# Patient Record
Sex: MALE | URBAN - METROPOLITAN AREA
[De-identification: names, ages, dates, MRNs, and addresses within clinical notes are randomized per-mention and may not be internally consistent; named-entity substitution may affect disease eponyms.]

---

## 2018-01-01 ENCOUNTER — OUTPATIENT (OUTPATIENT)
Dept: OUTPATIENT SERVICES | Facility: HOSPITAL | Age: 23
LOS: 1 days | End: 2018-01-01
Payer: MEDICAID

## 2018-01-01 PROCEDURE — G9001: CPT

## 2018-01-22 ENCOUNTER — INPATIENT (INPATIENT)
Facility: HOSPITAL | Age: 23
LOS: 2 days | Discharge: HOME | DRG: 637 | End: 2018-01-25
Attending: HOSPITALIST | Admitting: INTERNAL MEDICINE
Payer: MEDICAID

## 2018-01-22 VITALS
HEART RATE: 114 BPM | TEMPERATURE: 93 F | DIASTOLIC BLOOD PRESSURE: 84 MMHG | WEIGHT: 130.07 LBS | OXYGEN SATURATION: 100 % | SYSTOLIC BLOOD PRESSURE: 139 MMHG | RESPIRATION RATE: 18 BRPM | HEIGHT: 71 IN

## 2018-01-22 DIAGNOSIS — F10.20 ALCOHOL DEPENDENCE, UNCOMPLICATED: ICD-10-CM

## 2018-01-22 DIAGNOSIS — E13.10 OTHER SPECIFIED DIABETES MELLITUS WITH KETOACIDOSIS WITHOUT COMA: ICD-10-CM

## 2018-01-22 LAB
ACETONE SERPL-MCNC: ABNORMAL
ALBUMIN SERPL ELPH-MCNC: 5 G/DL — SIGNIFICANT CHANGE UP (ref 3.3–5.2)
ALP SERPL-CCNC: 131 U/L — HIGH (ref 40–120)
ALT FLD-CCNC: 26 U/L — SIGNIFICANT CHANGE UP
ANION GAP SERPL CALC-SCNC: 17 MMOL/L — SIGNIFICANT CHANGE UP (ref 5–17)
ANION GAP SERPL CALC-SCNC: 31 MMOL/L — HIGH (ref 5–17)
ANION GAP SERPL CALC-SCNC: >43 MMOL/L — HIGH (ref 5–17)
APPEARANCE UR: CLEAR — SIGNIFICANT CHANGE UP
APTT BLD: 35.2 SEC — SIGNIFICANT CHANGE UP (ref 27.5–37.4)
AST SERPL-CCNC: 26 U/L — SIGNIFICANT CHANGE UP
BASE EXCESS BLDA CALC-SCNC: -29.6 MMOL/L — LOW (ref -2–2)
BASOPHILS # BLD AUTO: 0 K/UL — SIGNIFICANT CHANGE UP (ref 0–0.2)
BASOPHILS NFR BLD AUTO: 1 % — SIGNIFICANT CHANGE UP (ref 0–2)
BILIRUB SERPL-MCNC: 0.3 MG/DL — LOW (ref 0.4–2)
BILIRUB UR-MCNC: NEGATIVE — SIGNIFICANT CHANGE UP
BLOOD GAS COMMENTS ARTERIAL: SIGNIFICANT CHANGE UP
BUN SERPL-MCNC: 15 MG/DL — SIGNIFICANT CHANGE UP (ref 8–20)
BUN SERPL-MCNC: 18 MG/DL — SIGNIFICANT CHANGE UP (ref 8–20)
BUN SERPL-MCNC: 9 MG/DL — SIGNIFICANT CHANGE UP (ref 8–20)
CALCIUM SERPL-MCNC: 10 MG/DL — SIGNIFICANT CHANGE UP (ref 8.6–10.2)
CALCIUM SERPL-MCNC: 8.1 MG/DL — LOW (ref 8.6–10.2)
CALCIUM SERPL-MCNC: 8.2 MG/DL — LOW (ref 8.6–10.2)
CHLORIDE SERPL-SCNC: 100 MMOL/L — SIGNIFICANT CHANGE UP (ref 98–107)
CHLORIDE SERPL-SCNC: 81 MMOL/L — LOW (ref 98–107)
CHLORIDE SERPL-SCNC: 94 MMOL/L — LOW (ref 98–107)
CO2 SERPL-SCNC: 16 MMOL/L — LOW (ref 22–29)
CO2 SERPL-SCNC: 6 MMOL/L — CRITICAL LOW (ref 22–29)
CO2 SERPL-SCNC: <6 MMOL/L — CRITICAL LOW (ref 22–29)
COLOR SPEC: YELLOW — SIGNIFICANT CHANGE UP
CREAT SERPL-MCNC: 0.76 MG/DL — SIGNIFICANT CHANGE UP (ref 0.5–1.3)
CREAT SERPL-MCNC: 0.79 MG/DL — SIGNIFICANT CHANGE UP (ref 0.5–1.3)
CREAT SERPL-MCNC: 1.35 MG/DL — HIGH (ref 0.5–1.3)
DIFF PNL FLD: ABNORMAL
EOSINOPHIL # BLD AUTO: 0 K/UL — SIGNIFICANT CHANGE UP (ref 0–0.5)
ETHANOL SERPL-MCNC: <10 MG/DL — SIGNIFICANT CHANGE UP
GAS PNL BLDA: SIGNIFICANT CHANGE UP
GLUCOSE BLDC GLUCOMTR-MCNC: 188 MG/DL — HIGH (ref 70–99)
GLUCOSE BLDC GLUCOMTR-MCNC: 198 MG/DL — HIGH (ref 70–99)
GLUCOSE BLDC GLUCOMTR-MCNC: 201 MG/DL — HIGH (ref 70–99)
GLUCOSE BLDC GLUCOMTR-MCNC: 209 MG/DL — HIGH (ref 70–99)
GLUCOSE BLDC GLUCOMTR-MCNC: 221 MG/DL — HIGH (ref 70–99)
GLUCOSE BLDC GLUCOMTR-MCNC: 230 MG/DL — HIGH (ref 70–99)
GLUCOSE BLDC GLUCOMTR-MCNC: 243 MG/DL — HIGH (ref 70–99)
GLUCOSE BLDC GLUCOMTR-MCNC: 245 MG/DL — HIGH (ref 70–99)
GLUCOSE BLDC GLUCOMTR-MCNC: 272 MG/DL — HIGH (ref 70–99)
GLUCOSE BLDC GLUCOMTR-MCNC: 359 MG/DL — HIGH (ref 70–99)
GLUCOSE BLDC GLUCOMTR-MCNC: 416 MG/DL — HIGH (ref 70–99)
GLUCOSE SERPL-MCNC: 196 MG/DL — HIGH (ref 70–115)
GLUCOSE SERPL-MCNC: 214 MG/DL — HIGH (ref 70–115)
GLUCOSE SERPL-MCNC: 516 MG/DL — CRITICAL HIGH (ref 70–115)
GLUCOSE UR QL: 1000 MG/DL
HCO3 BLDA-SCNC: 6 MMOL/L — LOW (ref 20–26)
HCT VFR BLD CALC: 53 % — HIGH (ref 42–52)
HGB BLD-MCNC: 17.9 G/DL — SIGNIFICANT CHANGE UP (ref 14–18)
HOROWITZ INDEX BLDA+IHG-RTO: 21 — SIGNIFICANT CHANGE UP
INR BLD: 0.97 RATIO — SIGNIFICANT CHANGE UP (ref 0.88–1.16)
KETONES UR-MCNC: ABNORMAL
LACTATE BLDV-MCNC: 3.4 MMOL/L — HIGH (ref 0.5–2)
LACTATE BLDV-MCNC: 7.3 MMOL/L — CRITICAL HIGH (ref 0.5–2)
LACTATE SERPL-SCNC: 1.6 MMOL/L — SIGNIFICANT CHANGE UP (ref 0.5–2)
LEUKOCYTE ESTERASE UR-ACNC: NEGATIVE — SIGNIFICANT CHANGE UP
LIDOCAIN IGE QN: 14 U/L — LOW (ref 22–51)
LYMPHOCYTES # BLD AUTO: 14 % — LOW (ref 20–55)
LYMPHOCYTES # BLD AUTO: 3.7 K/UL — SIGNIFICANT CHANGE UP (ref 1–4.8)
MAGNESIUM SERPL-MCNC: 1.6 MG/DL — SIGNIFICANT CHANGE UP (ref 1.6–2.6)
MAGNESIUM SERPL-MCNC: 2.7 MG/DL — HIGH (ref 1.6–2.6)
MCHC RBC-ENTMCNC: 30.6 PG — SIGNIFICANT CHANGE UP (ref 27–31)
MCHC RBC-ENTMCNC: 33.8 G/DL — SIGNIFICANT CHANGE UP (ref 32–36)
MCV RBC AUTO: 90.6 FL — SIGNIFICANT CHANGE UP (ref 80–94)
MONOCYTES # BLD AUTO: 2.1 K/UL — HIGH (ref 0–0.8)
MONOCYTES NFR BLD AUTO: 8 % — SIGNIFICANT CHANGE UP (ref 3–10)
MYELOCYTES NFR BLD: 1 % — HIGH (ref 0–0)
NEUTROPHILS # BLD AUTO: 19.5 K/UL — HIGH (ref 1.8–8)
NEUTROPHILS NFR BLD AUTO: 71 % — SIGNIFICANT CHANGE UP (ref 37–73)
NEUTS BAND # BLD: 4 % — SIGNIFICANT CHANGE UP (ref 0–8)
NITRITE UR-MCNC: NEGATIVE — SIGNIFICANT CHANGE UP
NRBC # BLD: 1 /100 — HIGH (ref 0–0)
PCO2 BLDA: SIGNIFICANT CHANGE UP MMHG (ref 35–45)
PCP SPEC-MCNC: SIGNIFICANT CHANGE UP
PH BLDA: 6.89 — CRITICAL LOW (ref 7.35–7.45)
PH UR: 5 — SIGNIFICANT CHANGE UP (ref 5–8)
PHOSPHATE SERPL-MCNC: 1.2 MG/DL — LOW (ref 2.4–4.7)
PHOSPHATE SERPL-MCNC: 6.9 MG/DL — HIGH (ref 2.4–4.7)
PLAT MORPH BLD: NORMAL — SIGNIFICANT CHANGE UP
PLATELET # BLD AUTO: 241 K/UL — SIGNIFICANT CHANGE UP (ref 150–400)
PO2 BLDA: 148 MMHG — HIGH (ref 83–108)
POTASSIUM SERPL-MCNC: 4 MMOL/L — SIGNIFICANT CHANGE UP (ref 3.5–5.3)
POTASSIUM SERPL-MCNC: 4.1 MMOL/L — SIGNIFICANT CHANGE UP (ref 3.5–5.3)
POTASSIUM SERPL-MCNC: 4.6 MMOL/L — SIGNIFICANT CHANGE UP (ref 3.5–5.3)
POTASSIUM SERPL-SCNC: 4 MMOL/L — SIGNIFICANT CHANGE UP (ref 3.5–5.3)
POTASSIUM SERPL-SCNC: 4.1 MMOL/L — SIGNIFICANT CHANGE UP (ref 3.5–5.3)
POTASSIUM SERPL-SCNC: 4.6 MMOL/L — SIGNIFICANT CHANGE UP (ref 3.5–5.3)
PROT SERPL-MCNC: 8.3 G/DL — SIGNIFICANT CHANGE UP (ref 6.6–8.7)
PROT UR-MCNC: 100 MG/DL
PROTHROM AB SERPL-ACNC: 10.7 SEC — SIGNIFICANT CHANGE UP (ref 9.8–12.7)
RAPID RVP RESULT: SIGNIFICANT CHANGE UP
RBC # BLD: 5.85 M/UL — SIGNIFICANT CHANGE UP (ref 4.6–6.2)
RBC # FLD: 13.2 % — SIGNIFICANT CHANGE UP (ref 11–15.6)
RBC BLD AUTO: NORMAL — SIGNIFICANT CHANGE UP
SAO2 % BLDA: 98 % — SIGNIFICANT CHANGE UP (ref 95–99)
SODIUM SERPL-SCNC: 130 MMOL/L — LOW (ref 135–145)
SODIUM SERPL-SCNC: 131 MMOL/L — LOW (ref 135–145)
SODIUM SERPL-SCNC: 133 MMOL/L — LOW (ref 135–145)
SP GR SPEC: 1.02 — SIGNIFICANT CHANGE UP (ref 1.01–1.02)
UROBILINOGEN FLD QL: NEGATIVE MG/DL — SIGNIFICANT CHANGE UP
VARIANT LYMPHS # BLD: 1 % — SIGNIFICANT CHANGE UP (ref 0–6)
WBC # BLD: 26.3 K/UL — HIGH (ref 4.8–10.8)
WBC # FLD AUTO: 26.3 K/UL — HIGH (ref 4.8–10.8)

## 2018-01-22 PROCEDURE — 93010 ELECTROCARDIOGRAM REPORT: CPT

## 2018-01-22 PROCEDURE — 99291 CRITICAL CARE FIRST HOUR: CPT

## 2018-01-22 PROCEDURE — 97163 PT EVAL HIGH COMPLEX 45 MIN: CPT

## 2018-01-22 PROCEDURE — 71045 X-RAY EXAM CHEST 1 VIEW: CPT | Mod: 26

## 2018-01-22 RX ORDER — SODIUM CHLORIDE 9 MG/ML
1000 INJECTION, SOLUTION INTRAVENOUS
Qty: 0 | Refills: 0 | Status: DISCONTINUED | OUTPATIENT
Start: 2018-01-22 | End: 2018-01-23

## 2018-01-22 RX ORDER — SODIUM CHLORIDE 9 MG/ML
1000 INJECTION, SOLUTION INTRAVENOUS
Qty: 0 | Refills: 0 | Status: COMPLETED | OUTPATIENT
Start: 2018-01-22 | End: 2018-01-22

## 2018-01-22 RX ORDER — SODIUM CHLORIDE 9 MG/ML
500 INJECTION INTRAMUSCULAR; INTRAVENOUS; SUBCUTANEOUS
Qty: 0 | Refills: 0 | Status: DISCONTINUED | OUTPATIENT
Start: 2018-01-22 | End: 2018-01-22

## 2018-01-22 RX ORDER — SODIUM BICARBONATE 1 MEQ/ML
50 SYRINGE (ML) INTRAVENOUS ONCE
Qty: 0 | Refills: 0 | Status: COMPLETED | OUTPATIENT
Start: 2018-01-22 | End: 2018-01-22

## 2018-01-22 RX ORDER — MAGNESIUM SULFATE 500 MG/ML
2 VIAL (ML) INJECTION ONCE
Qty: 0 | Refills: 0 | Status: COMPLETED | OUTPATIENT
Start: 2018-01-22 | End: 2018-01-22

## 2018-01-22 RX ORDER — SODIUM CHLORIDE 9 MG/ML
1000 INJECTION, SOLUTION INTRAVENOUS
Qty: 0 | Refills: 0 | Status: DISCONTINUED | OUTPATIENT
Start: 2018-01-22 | End: 2018-01-22

## 2018-01-22 RX ORDER — INSULIN HUMAN 100 [IU]/ML
4 INJECTION, SOLUTION SUBCUTANEOUS
Qty: 100 | Refills: 0 | Status: DISCONTINUED | OUTPATIENT
Start: 2018-01-22 | End: 2018-01-23

## 2018-01-22 RX ORDER — SODIUM CHLORIDE 9 MG/ML
3 INJECTION INTRAMUSCULAR; INTRAVENOUS; SUBCUTANEOUS ONCE
Qty: 0 | Refills: 0 | Status: COMPLETED | OUTPATIENT
Start: 2018-01-22 | End: 2018-01-22

## 2018-01-22 RX ORDER — AZTREONAM 2 G
2000 VIAL (EA) INJECTION ONCE
Qty: 0 | Refills: 0 | Status: COMPLETED | OUTPATIENT
Start: 2018-01-22 | End: 2018-01-22

## 2018-01-22 RX ORDER — ENOXAPARIN SODIUM 100 MG/ML
40 INJECTION SUBCUTANEOUS DAILY
Qty: 0 | Refills: 0 | Status: DISCONTINUED | OUTPATIENT
Start: 2018-01-22 | End: 2018-01-25

## 2018-01-22 RX ADMIN — SODIUM CHLORIDE 2000 MILLILITER(S): 9 INJECTION INTRAMUSCULAR; INTRAVENOUS; SUBCUTANEOUS at 12:17

## 2018-01-22 RX ADMIN — Medication 100 MILLIGRAM(S): at 11:18

## 2018-01-22 RX ADMIN — Medication 83.33 MILLIMOLE(S): at 19:52

## 2018-01-22 RX ADMIN — SODIUM CHLORIDE 2000 MILLILITER(S): 9 INJECTION INTRAMUSCULAR; INTRAVENOUS; SUBCUTANEOUS at 10:36

## 2018-01-22 RX ADMIN — Medication 50 GRAM(S): at 19:52

## 2018-01-22 RX ADMIN — SODIUM CHLORIDE 2000 MILLILITER(S): 9 INJECTION, SOLUTION INTRAVENOUS at 22:15

## 2018-01-22 RX ADMIN — Medication 50 MILLIEQUIVALENT(S): at 12:19

## 2018-01-22 RX ADMIN — SODIUM CHLORIDE 2000 MILLILITER(S): 9 INJECTION, SOLUTION INTRAVENOUS at 21:44

## 2018-01-22 RX ADMIN — INSULIN HUMAN 7 UNIT(S)/HR: 100 INJECTION, SOLUTION SUBCUTANEOUS at 11:40

## 2018-01-22 RX ADMIN — SODIUM CHLORIDE 200 MILLILITER(S): 9 INJECTION, SOLUTION INTRAVENOUS at 14:15

## 2018-01-22 RX ADMIN — Medication 50 MILLIEQUIVALENT(S): at 14:15

## 2018-01-22 RX ADMIN — SODIUM CHLORIDE 200 MILLILITER(S): 9 INJECTION, SOLUTION INTRAVENOUS at 17:21

## 2018-01-22 RX ADMIN — SODIUM CHLORIDE 3 MILLILITER(S): 9 INJECTION INTRAMUSCULAR; INTRAVENOUS; SUBCUTANEOUS at 10:37

## 2018-01-22 NOTE — ED PROVIDER NOTE - OBJECTIVE STATEMENT
22 y M patient presents to ED c/o cough, chills a few days. He also reports that he has been feeling unwell over the last couple of days. No further complaints at this time.

## 2018-01-22 NOTE — H&P ADULT - ATTENDING COMMENTS
Agree with above    - new dx of diabetes, will need endocrine consult, diabetic education  - insulin gtt, IV fluids, HemA1C

## 2018-01-22 NOTE — ED PROVIDER NOTE - CRITICAL CARE PROVIDED
documentation/additional history taking/consult w/ pt's family directly relating to pts condition/direct patient care (not related to procedure)

## 2018-01-22 NOTE — H&P ADULT - NSHPPHYSICALEXAM_GEN_ALL_CORE
General: AAOx3, responsive, cooperative, No acute distress  ENT: PERRLA, symmetric.  Resp: Good air entry b/l, no wheeze, rhonchi/rales  Cardiac: +S1/S2. Sinus tachycardia, no murmurs  Abd: soft, non tender, non distended + BS  Ext: no edema/ non tender  Neuro: AAOx3, no focal deficits

## 2018-01-22 NOTE — H&P ADULT - PROBLEM SELECTOR PLAN 1
Insulin infusion, will titrate per protocol, keep until AG closes   accu checks q1 hr   BMP q6hr, electrolyte replacement   2 amps of Bicarb   c/w D5 ,1/2 NS @200cc/hr   Keep NPO until closure of AG  Will bridge to long acting insulin  Repeat lactate  f/u Endocrine consult   Diabetes Education

## 2018-01-22 NOTE — H&P ADULT - ASSESSMENT
23 y/o male w/ no significant pmhx besides former ETOH abuse presents to ED after 4 days of URI symptoms and waking up this morning w/ N/V, SOB and shakiness. Being admitted for DKA 2/2 to unknown diagnosis of type 1 DM. Seen in ER SOB and tachypneic but w/out Kussmaul respirations.

## 2018-01-22 NOTE — ED ADULT NURSE NOTE - OBJECTIVE STATEMENT
22 year old male comes to ED c/o of increased weakness. as per pt. for the past x3 days he states his legs were weak. pt. states since yesterday he's been weaker. pt. states he vomited x3 times yesterday. pt. denies fevers, diarrhea. CODE SEPSIS called to recess. code team called to bedside. pt. has Kussmaul's respirations, pt. hypothermic, tachycardic. pt. states he is a recovering alcoholic. pt. states last drink was 1 1/2 a go. CODE SEPSIS called to recess. code team called to bedside.  22 year old male comes to ED c/o of increased weakness. as per pt. for the past week and a half pt. has been feeling loss. pt. states for the past x2 days his legs have weakened. pt. c/o dry mouth, increasing thirst, fatigued, increased urination. pt. states he vomited x3 times yesterday and a few day. pt. states he has noticed weight loss. pt. denies fevers, diarrhea, cough. pt. has Kussmaul's respirations, pt. hypothermic, tachycardic. pt. states he is a recovering alcoholic. pt. states last drink was 1 1/2 a go.

## 2018-01-22 NOTE — PATIENT PROFILE ADULT. - REASON FOR ADMISSION
feeling weaker over the past week, almost collapsed today, feeling increased thirst over the past 3 weeks, loosing weight, also urinating more frequently

## 2018-01-22 NOTE — ED PROVIDER NOTE - CARE PLAN
Principal Discharge DX:	Diabetic ketoacidosis without coma associated with other specified diabetes mellitus

## 2018-01-22 NOTE — H&P ADULT - NSHPLABSRESULTS_GEN_ALL_CORE
< from: Xray Chest 1 View AP/PA. (01.22.18 @ 10:44) >    History: Hypothermia.    Date and time of exam: 1/22/2018 10:43 AM.    Comparison: No prior exam.    Technique: A single AP view of the chest was obtained.    Findings:  The lungs are clear. The heart and mediastinum are unremarkable.  No   hilar abnormality is seen.  There is no evidence of pleural effusion. The   visualized osseous structures demonstrate no abnormality.    Impression:    Unremarkable exam.      42 min of critical care spent elevating, treating and speaking w/ patient/ family

## 2018-01-22 NOTE — H&P ADULT - HISTORY OF PRESENT ILLNESS
21 y/o male w/ no significant pmhx besides former ETOH abuse presents to ED after 4 days of URI symptoms and waking up this morning w/ N/V, SOB and shakiness. The past week he admits to increasing polydipsia and polyuria w/ out any dysuria. Pt is currently a recovering alcoholic, admits to smoking marijuana but denies any other drug use.  He had gone for testing for type 1 DM as a child that were negative, however his mother was diagnosed w/ Type 1 DM at the age of 22.

## 2018-01-22 NOTE — ED ADULT NURSE NOTE - CHIEF COMPLAINT QUOTE
Patient reports viral symptoms, cough, body aches, chills since Thursday. Mask applied. Patient hypothermic rectally. Warming blanket applied. Patient appears very frail with kussmaul respirations. Denies DM hx, CODE SEPSIS activated and MD Esposito @ bedside.

## 2018-01-22 NOTE — ED ADULT TRIAGE NOTE - CHIEF COMPLAINT QUOTE
Patient reports viral symptoms, cough, body aches, chills since Thursday. Mask applied. Patient reports viral symptoms, cough, body aches, chills since Thursday. Mask applied. Patient hypothermic rectally. Warming blanket applied. Patient reports viral symptoms, cough, body aches, chills since Thursday. Mask applied. Patient hypothermic rectally. Warming blanket applied. Patient appears very frail with kussmaul respirations. Denies DM hx, CODE SEPSIS activated and MD Esposito @ bedside. Patient reports viral symptoms, cough, body aches, chills since Thursday. Mask applied. Patient hypothermic rectally. Warming blanket applied. Patient appears very frail with kussmaul respirations. Denies DM hx, CODE SEPSIS activated and MD Esposito @ bedside.

## 2018-01-23 DIAGNOSIS — E10.10 TYPE 1 DIABETES MELLITUS WITH KETOACIDOSIS WITHOUT COMA: ICD-10-CM

## 2018-01-23 LAB
ANION GAP SERPL CALC-SCNC: 13 MMOL/L — SIGNIFICANT CHANGE UP (ref 5–17)
ANION GAP SERPL CALC-SCNC: 14 MMOL/L — SIGNIFICANT CHANGE UP (ref 5–17)
ANION GAP SERPL CALC-SCNC: 17 MMOL/L — SIGNIFICANT CHANGE UP (ref 5–17)
BUN SERPL-MCNC: 6 MG/DL — LOW (ref 8–20)
BUN SERPL-MCNC: 7 MG/DL — LOW (ref 8–20)
BUN SERPL-MCNC: 8 MG/DL — SIGNIFICANT CHANGE UP (ref 8–20)
CALCIUM SERPL-MCNC: 7.2 MG/DL — LOW (ref 8.6–10.2)
CALCIUM SERPL-MCNC: 7.3 MG/DL — LOW (ref 8.6–10.2)
CALCIUM SERPL-MCNC: 8 MG/DL — LOW (ref 8.6–10.2)
CHLORIDE SERPL-SCNC: 100 MMOL/L — SIGNIFICANT CHANGE UP (ref 98–107)
CHLORIDE SERPL-SCNC: 102 MMOL/L — SIGNIFICANT CHANGE UP (ref 98–107)
CHLORIDE SERPL-SCNC: 108 MMOL/L — HIGH (ref 98–107)
CO2 SERPL-SCNC: 20 MMOL/L — LOW (ref 22–29)
CREAT SERPL-MCNC: 0.45 MG/DL — LOW (ref 0.5–1.3)
CREAT SERPL-MCNC: 0.51 MG/DL — SIGNIFICANT CHANGE UP (ref 0.5–1.3)
CREAT SERPL-MCNC: 0.56 MG/DL — SIGNIFICANT CHANGE UP (ref 0.5–1.3)
GLUCOSE BLDC GLUCOMTR-MCNC: 116 MG/DL — HIGH (ref 70–99)
GLUCOSE BLDC GLUCOMTR-MCNC: 132 MG/DL — HIGH (ref 70–99)
GLUCOSE BLDC GLUCOMTR-MCNC: 173 MG/DL — HIGH (ref 70–99)
GLUCOSE BLDC GLUCOMTR-MCNC: 179 MG/DL — HIGH (ref 70–99)
GLUCOSE BLDC GLUCOMTR-MCNC: 200 MG/DL — HIGH (ref 70–99)
GLUCOSE BLDC GLUCOMTR-MCNC: 201 MG/DL — HIGH (ref 70–99)
GLUCOSE BLDC GLUCOMTR-MCNC: 208 MG/DL — HIGH (ref 70–99)
GLUCOSE BLDC GLUCOMTR-MCNC: 254 MG/DL — HIGH (ref 70–99)
GLUCOSE BLDC GLUCOMTR-MCNC: 71 MG/DL — SIGNIFICANT CHANGE UP (ref 70–99)
GLUCOSE BLDC GLUCOMTR-MCNC: 76 MG/DL — SIGNIFICANT CHANGE UP (ref 70–99)
GLUCOSE SERPL-MCNC: 202 MG/DL — HIGH (ref 70–115)
GLUCOSE SERPL-MCNC: 43 MG/DL — CRITICAL LOW (ref 70–115)
GLUCOSE SERPL-MCNC: 97 MG/DL — SIGNIFICANT CHANGE UP (ref 70–115)
HBA1C BLD-MCNC: >16.9 % — HIGH (ref 4–5.6)
HCT VFR BLD CALC: 35.1 % — LOW (ref 42–52)
HGB BLD-MCNC: 12.4 G/DL — LOW (ref 14–18)
LACTATE SERPL-SCNC: 0.9 MMOL/L — SIGNIFICANT CHANGE UP (ref 0.5–2)
MAGNESIUM SERPL-MCNC: 2 MG/DL — SIGNIFICANT CHANGE UP (ref 1.8–2.6)
MAGNESIUM SERPL-MCNC: 2.1 MG/DL — SIGNIFICANT CHANGE UP (ref 1.6–2.6)
MCHC RBC-ENTMCNC: 29.2 PG — SIGNIFICANT CHANGE UP (ref 27–31)
MCHC RBC-ENTMCNC: 35.3 G/DL — SIGNIFICANT CHANGE UP (ref 32–36)
MCV RBC AUTO: 82.6 FL — SIGNIFICANT CHANGE UP (ref 80–94)
PHOSPHATE SERPL-MCNC: 2.2 MG/DL — LOW (ref 2.4–4.7)
PHOSPHATE SERPL-MCNC: 2.3 MG/DL — LOW (ref 2.4–4.7)
PLATELET # BLD AUTO: 173 K/UL — SIGNIFICANT CHANGE UP (ref 150–400)
POTASSIUM SERPL-MCNC: 2.9 MMOL/L — CRITICAL LOW (ref 3.5–5.3)
POTASSIUM SERPL-MCNC: 3.5 MMOL/L — SIGNIFICANT CHANGE UP (ref 3.5–5.3)
POTASSIUM SERPL-MCNC: 3.6 MMOL/L — SIGNIFICANT CHANGE UP (ref 3.5–5.3)
POTASSIUM SERPL-SCNC: 2.9 MMOL/L — CRITICAL LOW (ref 3.5–5.3)
POTASSIUM SERPL-SCNC: 3.5 MMOL/L — SIGNIFICANT CHANGE UP (ref 3.5–5.3)
POTASSIUM SERPL-SCNC: 3.6 MMOL/L — SIGNIFICANT CHANGE UP (ref 3.5–5.3)
RBC # BLD: 4.25 M/UL — LOW (ref 4.6–6.2)
RBC # FLD: 12.9 % — SIGNIFICANT CHANGE UP (ref 11–15.6)
SODIUM SERPL-SCNC: 136 MMOL/L — SIGNIFICANT CHANGE UP (ref 135–145)
SODIUM SERPL-SCNC: 137 MMOL/L — SIGNIFICANT CHANGE UP (ref 135–145)
SODIUM SERPL-SCNC: 141 MMOL/L — SIGNIFICANT CHANGE UP (ref 135–145)
WBC # BLD: 16.2 K/UL — HIGH (ref 4.8–10.8)
WBC # FLD AUTO: 16.2 K/UL — HIGH (ref 4.8–10.8)

## 2018-01-23 PROCEDURE — 99233 SBSQ HOSP IP/OBS HIGH 50: CPT

## 2018-01-23 RX ORDER — INSULIN LISPRO 100/ML
VIAL (ML) SUBCUTANEOUS AT BEDTIME
Qty: 0 | Refills: 0 | Status: DISCONTINUED | OUTPATIENT
Start: 2018-01-23 | End: 2018-01-25

## 2018-01-23 RX ORDER — INSULIN LISPRO 100/ML
VIAL (ML) SUBCUTANEOUS
Qty: 0 | Refills: 0 | Status: DISCONTINUED | OUTPATIENT
Start: 2018-01-23 | End: 2018-01-23

## 2018-01-23 RX ORDER — INSULIN GLARGINE 100 [IU]/ML
10 INJECTION, SOLUTION SUBCUTANEOUS ONCE
Qty: 0 | Refills: 0 | Status: COMPLETED | OUTPATIENT
Start: 2018-01-23 | End: 2018-01-23

## 2018-01-23 RX ORDER — DEXTROSE 50 % IN WATER 50 %
12.5 SYRINGE (ML) INTRAVENOUS ONCE
Qty: 0 | Refills: 0 | Status: DISCONTINUED | OUTPATIENT
Start: 2018-01-23 | End: 2018-01-25

## 2018-01-23 RX ORDER — POTASSIUM PHOSPHATE, MONOBASIC POTASSIUM PHOSPHATE, DIBASIC 236; 224 MG/ML; MG/ML
15 INJECTION, SOLUTION INTRAVENOUS ONCE
Qty: 0 | Refills: 0 | Status: COMPLETED | OUTPATIENT
Start: 2018-01-23 | End: 2018-01-23

## 2018-01-23 RX ORDER — DEXTROSE 50 % IN WATER 50 %
25 SYRINGE (ML) INTRAVENOUS ONCE
Qty: 0 | Refills: 0 | Status: DISCONTINUED | OUTPATIENT
Start: 2018-01-23 | End: 2018-01-25

## 2018-01-23 RX ORDER — INSULIN GLARGINE 100 [IU]/ML
10 INJECTION, SOLUTION SUBCUTANEOUS AT BEDTIME
Qty: 0 | Refills: 0 | Status: DISCONTINUED | OUTPATIENT
Start: 2018-01-23 | End: 2018-01-25

## 2018-01-23 RX ORDER — SODIUM CHLORIDE 9 MG/ML
1000 INJECTION, SOLUTION INTRAVENOUS
Qty: 0 | Refills: 0 | Status: DISCONTINUED | OUTPATIENT
Start: 2018-01-23 | End: 2018-01-23

## 2018-01-23 RX ORDER — INSULIN LISPRO 100/ML
VIAL (ML) SUBCUTANEOUS
Qty: 0 | Refills: 0 | Status: DISCONTINUED | OUTPATIENT
Start: 2018-01-23 | End: 2018-01-25

## 2018-01-23 RX ORDER — DEXTROSE 50 % IN WATER 50 %
1 SYRINGE (ML) INTRAVENOUS ONCE
Qty: 0 | Refills: 0 | Status: DISCONTINUED | OUTPATIENT
Start: 2018-01-23 | End: 2018-01-25

## 2018-01-23 RX ORDER — GLUCAGON INJECTION, SOLUTION 0.5 MG/.1ML
1 INJECTION, SOLUTION SUBCUTANEOUS ONCE
Qty: 0 | Refills: 0 | Status: DISCONTINUED | OUTPATIENT
Start: 2018-01-23 | End: 2018-01-25

## 2018-01-23 RX ORDER — INSULIN LISPRO 100/ML
3 VIAL (ML) SUBCUTANEOUS
Qty: 0 | Refills: 0 | Status: DISCONTINUED | OUTPATIENT
Start: 2018-01-23 | End: 2018-01-24

## 2018-01-23 RX ORDER — POTASSIUM CHLORIDE 20 MEQ
40 PACKET (EA) ORAL ONCE
Qty: 0 | Refills: 0 | Status: COMPLETED | OUTPATIENT
Start: 2018-01-23 | End: 2018-01-23

## 2018-01-23 RX ORDER — SODIUM CHLORIDE 9 MG/ML
1000 INJECTION, SOLUTION INTRAVENOUS ONCE
Qty: 0 | Refills: 0 | Status: COMPLETED | OUTPATIENT
Start: 2018-01-23 | End: 2018-01-23

## 2018-01-23 RX ORDER — SODIUM CHLORIDE 9 MG/ML
1000 INJECTION, SOLUTION INTRAVENOUS
Qty: 0 | Refills: 0 | Status: DISCONTINUED | OUTPATIENT
Start: 2018-01-23 | End: 2018-01-25

## 2018-01-23 RX ORDER — LANOLIN ALCOHOL/MO/W.PET/CERES
3 CREAM (GRAM) TOPICAL ONCE
Qty: 0 | Refills: 0 | Status: COMPLETED | OUTPATIENT
Start: 2018-01-23 | End: 2018-01-23

## 2018-01-23 RX ORDER — POTASSIUM CHLORIDE 20 MEQ
10 PACKET (EA) ORAL
Qty: 0 | Refills: 0 | Status: COMPLETED | OUTPATIENT
Start: 2018-01-23 | End: 2018-01-23

## 2018-01-23 RX ADMIN — POTASSIUM PHOSPHATE, MONOBASIC POTASSIUM PHOSPHATE, DIBASIC 62.5 MILLIMOLE(S): 236; 224 INJECTION, SOLUTION INTRAVENOUS at 01:37

## 2018-01-23 RX ADMIN — Medication 100 MILLIEQUIVALENT(S): at 03:37

## 2018-01-23 RX ADMIN — POTASSIUM PHOSPHATE, MONOBASIC POTASSIUM PHOSPHATE, DIBASIC 62.5 MILLIMOLE(S): 236; 224 INJECTION, SOLUTION INTRAVENOUS at 06:43

## 2018-01-23 RX ADMIN — Medication 4: at 12:15

## 2018-01-23 RX ADMIN — Medication 100 MILLIEQUIVALENT(S): at 04:42

## 2018-01-23 RX ADMIN — Medication 3 MILLIGRAM(S): at 21:34

## 2018-01-23 RX ADMIN — SODIUM CHLORIDE 2000 MILLILITER(S): 9 INJECTION, SOLUTION INTRAVENOUS at 06:43

## 2018-01-23 RX ADMIN — SODIUM CHLORIDE 2000 MILLILITER(S): 9 INJECTION, SOLUTION INTRAVENOUS at 01:39

## 2018-01-23 RX ADMIN — Medication 100 MILLIEQUIVALENT(S): at 01:41

## 2018-01-23 RX ADMIN — Medication 40 MILLIEQUIVALENT(S): at 01:37

## 2018-01-23 RX ADMIN — INSULIN GLARGINE 10 UNIT(S): 100 INJECTION, SOLUTION SUBCUTANEOUS at 03:03

## 2018-01-23 RX ADMIN — Medication 1: at 21:34

## 2018-01-23 RX ADMIN — Medication 2: at 16:50

## 2018-01-23 RX ADMIN — INSULIN GLARGINE 10 UNIT(S): 100 INJECTION, SOLUTION SUBCUTANEOUS at 21:34

## 2018-01-23 RX ADMIN — Medication 3 UNIT(S): at 16:50

## 2018-01-23 NOTE — CONSULT NOTE ADULT - SUBJECTIVE AND OBJECTIVE BOX
HPI:  21 y/o male w/ no significant pmhx besides former ETOH abuse presents to ED after 4 days of URI symptoms and waking up this morning w/ N/V, SOB and shakiness and admitted with DKA.  He was in ICU and DKA resolved after insulin drip.  He is currently feeling better.  He is tolerarting PO diet and denies abd pains/nausea/vomiting.  He also noted that he feels less thirsty. Mom at bedside.      PAST MEDICAL & SURGICAL HISTORY:  Recovering alcoholic  No significant past surgical history    FAMILY HISTORY: Mother T1DM - dx at 22 yrs old    SOCIAL HISTORY:Pt is currently a recovering alcoholic, admits to smoking marijuana but denies any other drug use. 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  Eyes: (+) blurry vision  Lungs: No shortness of breath, no wheezing, no cough  CV: No chest pain, no palpitations, no pain with walking.  GI: No nausea, no vomiting, no constipation, no diarrhea, no abdominal pain  : (+) polyuria (+) polydipsia  Musculoskeletal: leg weakness  Skin: No rash, no infections.  Neurologic: no burning or pain in feet  Psych: No depression, no anxiety, no trouble concentrating    MEDICATIONS  (STANDING):  dextrose 5%. 1000 milliLiter(s) (50 mL/Hr) IV Continuous <Continuous>  dextrose 50% Injectable 12.5 Gram(s) IV Push once  dextrose 50% Injectable 25 Gram(s) IV Push once  dextrose 50% Injectable 25 Gram(s) IV Push once  enoxaparin Injectable 40 milliGRAM(s) SubCutaneous daily  insulin glargine Injectable (LANTUS) 10 Unit(s) SubCutaneous at bedtime  insulin lispro (HumaLOG) corrective regimen sliding scale   SubCutaneous three times a day before meals  insulin lispro (HumaLOG) corrective regimen sliding scale   SubCutaneous at bedtime  insulin lispro Injectable (HumaLOG) 3 Unit(s) SubCutaneous three times a day before meals    MEDICATIONS  (PRN):  dextrose Gel 1 Dose(s) Oral once PRN Blood Glucose LESS THAN 70 milliGRAM(s)/deciLiter  glucagon  Injectable 1 milliGRAM(s) IntraMuscular once PRN Glucose <70 milliGRAM(s)/deciLiter      Allergies  penicillin (Hives)      PHYSICAL EXAM:    Vital Signs Last 24 Hrs  T(C): 37.2 (2018 16:00), Max: 37.2 (2018 16:00)  T(F): 99 (2018 16:00), Max: 99 (2018 16:00)  HR: 74 (2018 16:00) (71 - 85)  BP: 119/55 (2018 16:00) (99/58 - 126/64)  BP(mean): 77 (2018 10:38) (73 - 91)  RR: 18 (2018 16:00) (12 - 33)  SpO2: 98% (2018 16:00) (95% - 100%)    General appearance: thin appearing male, appears younger that stated age  Eyes: Pupils equal. EOMI  Neck: Trachea midline. No thyroid enlargement.  Lungs: Normal respiratory excursion. Lungs clear.  CV: Regular cardiac rhythm. No murmur. pedal pulses intact.  Abdomen: Soft, non tender, no organomegaly or mass. (+) bowel sounds  Musculoskeletal: No cyanosis, clubbing, or edema. No pedal lesions.  Skin: dry mucous membranes. No rash. No acanthosis.  Neuro: Cranial nerves intact. Normal motor and sensory function. DTR's normal.  Psych: Normal affect, good judgement.            LABS:                        12.4   16.2  )-----------( 173      ( 2018 05:28 )             35.1         137  |  100  |  6.0<L>  ----------------------------<  97  3.5   |  20.0<L>  |  0.51    Ca    8.0<L>      2018 06:37  Phos  2.3       Mg     2.0         TPro  8.3  /  Alb  5.0  /  TBili  0.3<L>  /  DBili  x   /  AST  26  /  ALT  26  /  AlkPhos  131<H>      Urinalysis Basic - ( 2018 12:21 )    Color: Yellow / Appearance: Clear / S.025 / pH: x  Gluc: x / Ketone: Large  / Bili: Negative / Urobili: Negative mg/dL   Blood: x / Protein: 100 mg/dL / Nitrite: Negative   Leuk Esterase: Negative / RBC: 3-5 /HPF / WBC 0-2   Sq Epi: x / Non Sq Epi: x / Bacteria: Occasional      LIVER FUNCTIONS - ( 2018 10:41 )  Alb: 5.0 g/dL / Pro: 8.3 g/dL / ALK PHOS: 131 U/L / ALT: 26 U/L / AST: 26 U/L / GGT: x           Hemoglobin A1C, Whole Blood: >16.9 % ( @ 05:28)    POCT Blood Glucose.: 173 mg/dL (2018 16:44)  POCT Blood Glucose.: 208 mg/dL (2018 11:58)  POCT Blood Glucose.: 132 mg/dL (2018 07:58)  POCT Blood Glucose.: 71 mg/dL (2018 05:02)  POCT Blood Glucose.: 76 mg/dL (2018 04:37)  POCT Blood Glucose.: 201 mg/dL (2018 03:03)  POCT Blood Glucose.: 116 mg/dL (2018 02:00)  POCT Blood Glucose.: 179 mg/dL (2018 00:58)  POCT Blood Glucose.: 200 mg/dL (2018 23:58)  POCT Blood Glucose.: 230 mg/dL (2018 23:10)  POCT Blood Glucose.: 209 mg/dL (2018 22:18)  POCT Blood Glucose.: 272 mg/dL (2018 21:15)  POCT Blood Glucose.: 221 mg/dL (2018 20:05)  POCT Blood Glucose.: 201 mg/dL (2018 19:08)  POCT Blood Glucose.: 245 mg/dL (2018 18:16)

## 2018-01-23 NOTE — PROGRESS NOTE ADULT - ASSESSMENT
22 yom new DKA resolved, transitions to SQ insulin need DM education, endocrine consult and fu    stable for transfer to floor

## 2018-01-23 NOTE — CONSULT NOTE ADULT - ASSESSMENT
22 year old male with new onset uncontrolled T1DM admitted with DKA. DKA resolved and glucoses improved with subcutaneous insulin.  - cont Lantus 10 units  - agree with Humalog 3 units premeal  - cont Humalog scale  - insulin teaching while in hospital, will also need diabetic education  - discussed with pt importance of adherence with insulin, hasmukh basal insulin to prevent DKA in future.    - we also discussed important of check sugars with meter on a daily basis  - I reviewed with him signs/symptoms/treatment of hypoglycemia  - I strongly advised that he follow up with me as outpatient upon discharge  - will follow

## 2018-01-23 NOTE — PROGRESS NOTE ADULT - SUBJECTIVE AND OBJECTIVE BOX
Patient is a 22y old  Male who presents with a chief complaint of DKA (2018 16:09)      BRIEF HOSPITAL COURSE: 22 yom with URI symptoms found to be in DKA    Events last 24 hours: no acute issues off insulin gtt    PAST MEDICAL & SURGICAL HISTORY:  Recovering alcoholic  No significant past surgical history      Review of Systems:  CONSTITUTIONAL: No fever, chills, or fatigue  EYES: No eye pain, visual disturbances, or discharge  ENMT:  No difficulty hearing, tinnitus, vertigo; No sinus or throat pain  NECK: No pain or stiffness  RESPIRATORY: No cough, wheezing, chills or hemoptysis; No shortness of breath  CARDIOVASCULAR: No chest pain, palpitations, dizziness, or leg swelling  GASTROINTESTINAL: No abdominal or epigastric pain. No nausea, vomiting, or hematemesis; No diarrhea or constipation. No melena or hematochezia.  GENITOURINARY: No dysuria, frequency, hematuria, or incontinence  NEUROLOGICAL: No headaches, memory loss, loss of strength, numbness, or tremors  SKIN: No itching, burning, rashes, or lesions   MUSCULOSKELETAL: No joint pain or swelling; No muscle, back, or extremity pain  PSYCHIATRIC: No depression, anxiety, mood swings, or difficulty sleeping      Medications:            enoxaparin Injectable 40 milliGRAM(s) SubCutaneous daily        dextrose 50% Injectable 12.5 Gram(s) IV Push once  dextrose 50% Injectable 25 Gram(s) IV Push once  dextrose 50% Injectable 25 Gram(s) IV Push once  dextrose Gel 1 Dose(s) Oral once PRN  glucagon  Injectable 1 milliGRAM(s) IntraMuscular once PRN  insulin glargine Injectable (LANTUS) 10 Unit(s) SubCutaneous at bedtime  insulin lispro (HumaLOG) corrective regimen sliding scale   SubCutaneous three times a day before meals  insulin lispro (HumaLOG) corrective regimen sliding scale   SubCutaneous at bedtime  insulin lispro Injectable (HumaLOG) 3 Unit(s) SubCutaneous three times a day before meals    dextrose 5%. 1000 milliLiter(s) IV Continuous <Continuous>                ICU Vital Signs Last 24 Hrs  T(C): 37.2 (2018 16:00), Max: 37.2 (2018 16:00)  T(F): 99 (2018 16:00), Max: 99 (2018 16:00)  HR: 74 (2018 16:00) (71 - 83)  BP: 119/55 (2018 16:00) (99/58 - 126/64)  BP(mean): 77 (2018 10:38) (73 - 91)  ABP: --  ABP(mean): --  RR: 18 (2018 16:00) (12 - 33)  SpO2: 98% (2018 16:00) (95% - 100%)      ABG - ( 2018 11:41 )  pH: 6.89  /  pCO2: *     /  pO2: 148   / HCO3: 6     / Base Excess: -29.6 /  SaO2: 98                  I&O's Detail    2018 07:01  -  2018 07:00  --------------------------------------------------------  IN:    dextrose 5% + sodium chloride 0.45%.: 1725 mL    dextrose 5% + sodium chloride 0.45%.: 375 mL    insulin Infusion: 12 mL    insulin Infusion: 75 mL    insulin Infusion: 48 mL    insulin Infusion: 4 mL    IV PiggyBack: 3375 mL    multiple electrolytes Injection Type 1multiple electrolytes Injection Type 1: 600 mL    Solution: 50 mL    Solution: 499.8 mL  Total IN: 6763.8 mL    OUT:    Voided: 3500 mL  Total OUT: 3500 mL    Total NET: 3263.8 mL      2018 07:01  -  2018 21:06  --------------------------------------------------------  IN:    dextrose 5% + sodium chloride 0.45%.: 75 mL    IV PiggyBack: 62.5 mL    Oral Fluid: 240 mL  Total IN: 377.5 mL    OUT:  Total OUT: 0 mL    Total NET: 377.5 mL            LABS:                        12.4   16.2  )-----------( 173      ( 2018 05:28 )             35.1         137  |  100  |  6.0<L>  ----------------------------<  97  3.5   |  20.0<L>  |  0.51    Ca    8.0<L>      2018 06:37  Phos  2.3       Mg     2.0         TPro  8.3  /  Alb  5.0  /  TBili  0.3<L>  /  DBili  x   /  AST  26  /  ALT  26  /  AlkPhos  131<H>            CAPILLARY BLOOD GLUCOSE  188 (2018 15:00)      POCT Blood Glucose.: 173 mg/dL (2018 16:44)    PT/INR - ( 2018 10:41 )   PT: 10.7 sec;   INR: 0.97 ratio         PTT - ( 2018 10:41 )  PTT:35.2 sec  Urinalysis Basic - ( 2018 12:21 )    Color: Yellow / Appearance: Clear / S.025 / pH: x  Gluc: x / Ketone: Large  / Bili: Negative / Urobili: Negative mg/dL   Blood: x / Protein: 100 mg/dL / Nitrite: Negative   Leuk Esterase: Negative / RBC: 3-5 /HPF / WBC 0-2   Sq Epi: x / Non Sq Epi: x / Bacteria: Occasional      CULTURES:  Rapid RVP Result: NotDetec (18 @ 10:39)      Physical Examination:    General: No acute distress.  Alert, oriented, interactive, nonfocal    HEENT: Pupils equal, reactive to light.  Symmetric.    PULM: Clear to auscultation bilaterally, no significant sputum production    CVS: Regular rate and rhythm, no murmurs, rubs, or gallops    ABD: Soft, nondistended, nontender, normoactive bowel sounds, no masses    EXT: No edema, nontender    SKIN: Warm and well perfused, no rashes noted.

## 2018-01-23 NOTE — ADVANCED PRACTICE NURSE CONSULT - ASSESSMENT
pt is a+ox3 c/o 0 pain states he feels much better now. pt was educated about diabetes disease process w focus on type 1. pt states his mother has type1 diabetes and is using insulin pump. pt is a+ox3 c/o 0 pain states he feels much better now. pt was educated about diabetes disease process w focus on type 1. pt states his mother has type1 diabetes and is using insulin pump. pt was educated about the importance of using insulin @ this time. pt was educated about the 2 types of insulin he is on their different schedule and action. pt verbalized understanding. pt was educated about ss of hypoglycemia and treatment pt verbalized understanding.  pt did know who ihis mother is seeing for endocrine. will findout from his mother

## 2018-01-23 NOTE — PROGRESS NOTE ADULT - SUBJECTIVE AND OBJECTIVE BOX
ADRIAN MOHAN  ----------------------------------------  The patient was seen and evaluated for DKA.  The patient is in no acute distress.  Denied any chest pain, palpitations, dyspnea, or abdominal pain.    Vital Signs Last 24 Hrs  T(C): 36.8 (2018 08:01), Max: 37.1 (2018 00:00)  T(F): 98.2 (2018 08:01), Max: 98.8 (2018 00:00)  HR: 81 (2018 09:00) (71 - 111)  BP: 120/63 (2018 08:08) (99/58 - 145/77)  BP(mean): 84 (2018 08:08) (73 - 103)  RR: 31 (2018 09:00) (12 - 33)  SpO2: 98% (2018 09:00) (95% - 100%)    CAPILLARY BLOOD GLUCOSE  POCT Blood Glucose.: 132 mg/dL (2018 07:58)  POCT Blood Glucose.: 71 mg/dL (2018 05:02)  POCT Blood Glucose.: 76 mg/dL (2018 04:37)  POCT Blood Glucose.: 201 mg/dL (2018 03:03)  POCT Blood Glucose.: 116 mg/dL (2018 02:00)  POCT Blood Glucose.: 179 mg/dL (2018 00:58)  POCT Blood Glucose.: 200 mg/dL (2018 23:58)  POCT Blood Glucose.: 230 mg/dL (2018 23:10)  POCT Blood Glucose.: 209 mg/dL (2018 22:18)  POCT Blood Glucose.: 272 mg/dL (2018 21:15)  POCT Blood Glucose.: 221 mg/dL (2018 20:05)  POCT Blood Glucose.: 201 mg/dL (2018 19:08)  POCT Blood Glucose.: 245 mg/dL (2018 18:16)  POCT Blood Glucose.: 243 mg/dL (2018 16:53)  POCT Blood Glucose.: 198 mg/dL (2018 15:58)  POCT Blood Glucose.: 188 mg/dL (2018 15:01)  POCT Blood Glucose.: 416 mg/dL (2018 14:07)  POCT Blood Glucose.: 359 mg/dL (2018 12:53)    PHYSICAL EXAMINATION:  ----------------------------------------  General appearance: NAD, Awake, Alert  HEENT: NCAT, Conjunctiva clear, EOMI  Neck: Supple, No JVD, No tenderness  Lungs: Clear to auscultation, Breath sound equal bilaterally, No wheezes, No rales  Cardiovascular: S1S2, Regular rhythm  Abdomen: Soft, Nontender, Nondistended, No guarding/rebound, Positive bowel sounds  Extremities: No clubbing, No cyanosis, No edema, No calf tenderness  Neuro: Strength equal bilaterally, No tremors  Psychiatric: Appropriate mood, Normal affect    LABORATORY STUDIES:  ----------------------------------------             12.4   16.2  )-----------( 173      ( 2018 05:28 )             35.1         137  |  100  |  6.0<L>  ----------------------------<  97  3.5   |  20.0<L>  |  0.51    Ca    8.0<L>      2018 06:37  Phos  2.3       Mg     2.0         TPro  8.3  /  Alb  5.0  /  TBili  0.3<L>  /  DBili  x   /  AST  26  /  ALT  26  /  AlkPhos  131<H>      LIVER FUNCTIONS - ( 2018 10:41 )  Alb: 5.0 g/dL / Pro: 8.3 g/dL / ALK PHOS: 131 U/L / ALT: 26 U/L / AST: 26 U/L / GGT: x           PT/INR - ( 2018 10:41 )   PT: 10.7 sec;   INR: 0.97 ratio    PTT - ( 2018 10:41 )  PTT:35.2 sec    Urinalysis Basic - ( 2018 12:21 )  Color: Yellow / Appearance: Clear / S.025 / pH: x  Gluc: x / Ketone: Large  / Bili: Negative / Urobili: Negative mg/dL   Blood: x / Protein: 100 mg/dL / Nitrite: Negative   Leuk Esterase: Negative / RBC: 3-5 /HPF / WBC 0-2   Sq Epi: x / Non Sq Epi: x / Bacteria: Occasional    MEDICATIONS  (STANDING):  dextrose 5%. 1000 milliLiter(s) (50 mL/Hr) IV Continuous <Continuous>  dextrose 50% Injectable 12.5 Gram(s) IV Push once  dextrose 50% Injectable 25 Gram(s) IV Push once  dextrose 50% Injectable 25 Gram(s) IV Push once  enoxaparin Injectable 40 milliGRAM(s) SubCutaneous daily  insulin glargine Injectable (LANTUS) 10 Unit(s) SubCutaneous at bedtime  insulin lispro (HumaLOG) corrective regimen sliding scale   SubCutaneous Before meals and at bedtime    MEDICATIONS  (PRN):  dextrose Gel 1 Dose(s) Oral once PRN Blood Glucose LESS THAN 70 milliGRAM(s)/deciLiter  glucagon  Injectable 1 milliGRAM(s) IntraMuscular once PRN Glucose <70 milliGRAM(s)/deciLiter      ASSESSMENT / PLAN:  ----------------------------------------  DKA - Insulin infusion discontinued. On subcutaneous insulin therapy.     Leukocytosis - Persistent elevation in WBC, improved. No obvious source of infection noted. Respiratory panel was negative.    Anemia - Possibly dilutional. CBC to be repeated.    Hyponatremia - Sodium level normalized.    Acute kidney injury - Renal function improved with intravenous fluids. ADRIAN MOHAN  ----------------------------------------  The patient was seen and evaluated for DKA.  The patient is in no acute distress.  Denied any chest pain, palpitations, dyspnea, or abdominal pain.    Vital Signs Last 24 Hrs  T(C): 36.8 (2018 08:01), Max: 37.1 (2018 00:00)  T(F): 98.2 (2018 08:01), Max: 98.8 (2018 00:00)  HR: 81 (2018 09:00) (71 - 111)  BP: 120/63 (2018 08:08) (99/58 - 145/77)  BP(mean): 84 (2018 08:08) (73 - 103)  RR: 31 (2018 09:00) (12 - 33)  SpO2: 98% (2018 09:00) (95% - 100%)    CAPILLARY BLOOD GLUCOSE  POCT Blood Glucose.: 132 mg/dL (2018 07:58)  POCT Blood Glucose.: 71 mg/dL (2018 05:02)  POCT Blood Glucose.: 76 mg/dL (2018 04:37)  POCT Blood Glucose.: 201 mg/dL (2018 03:03)  POCT Blood Glucose.: 116 mg/dL (2018 02:00)  POCT Blood Glucose.: 179 mg/dL (2018 00:58)  POCT Blood Glucose.: 200 mg/dL (2018 23:58)  POCT Blood Glucose.: 230 mg/dL (2018 23:10)  POCT Blood Glucose.: 209 mg/dL (2018 22:18)  POCT Blood Glucose.: 272 mg/dL (2018 21:15)  POCT Blood Glucose.: 221 mg/dL (2018 20:05)  POCT Blood Glucose.: 201 mg/dL (2018 19:08)  POCT Blood Glucose.: 245 mg/dL (2018 18:16)  POCT Blood Glucose.: 243 mg/dL (2018 16:53)  POCT Blood Glucose.: 198 mg/dL (2018 15:58)  POCT Blood Glucose.: 188 mg/dL (2018 15:01)  POCT Blood Glucose.: 416 mg/dL (2018 14:07)  POCT Blood Glucose.: 359 mg/dL (2018 12:53)    PHYSICAL EXAMINATION:  ----------------------------------------  General appearance: NAD, Awake, Alert  HEENT: NCAT, Conjunctiva clear, EOMI  Neck: Supple, No JVD, No tenderness  Lungs: Clear to auscultation, Breath sound equal bilaterally, No wheezes, No rales  Cardiovascular: S1S2, Regular rhythm  Abdomen: Soft, Nontender, Nondistended, No guarding/rebound, Positive bowel sounds  Extremities: No clubbing, No cyanosis, No edema, No calf tenderness  Neuro: Strength equal bilaterally, No tremors  Psychiatric: Appropriate mood, Normal affect    LABORATORY STUDIES:  ----------------------------------------             12.4   16.2  )-----------( 173      ( 2018 05:28 )             35.1         137  |  100  |  6.0<L>  ----------------------------<  97  3.5   |  20.0<L>  |  0.51    Ca    8.0<L>      2018 06:37  Phos  2.3       Mg     2.0         TPro  8.3  /  Alb  5.0  /  TBili  0.3<L>  /  DBili  x   /  AST  26  /  ALT  26  /  AlkPhos  131<H>      LIVER FUNCTIONS - ( 2018 10:41 )  Alb: 5.0 g/dL / Pro: 8.3 g/dL / ALK PHOS: 131 U/L / ALT: 26 U/L / AST: 26 U/L / GGT: x           PT/INR - ( 2018 10:41 )   PT: 10.7 sec;   INR: 0.97 ratio    PTT - ( 2018 10:41 )  PTT:35.2 sec    Urinalysis Basic - ( 2018 12:21 )  Color: Yellow / Appearance: Clear / S.025 / pH: x  Gluc: x / Ketone: Large  / Bili: Negative / Urobili: Negative mg/dL   Blood: x / Protein: 100 mg/dL / Nitrite: Negative   Leuk Esterase: Negative / RBC: 3-5 /HPF / WBC 0-2   Sq Epi: x / Non Sq Epi: x / Bacteria: Occasional    MEDICATIONS  (STANDING):  dextrose 5%. 1000 milliLiter(s) (50 mL/Hr) IV Continuous <Continuous>  dextrose 50% Injectable 12.5 Gram(s) IV Push once  dextrose 50% Injectable 25 Gram(s) IV Push once  dextrose 50% Injectable 25 Gram(s) IV Push once  enoxaparin Injectable 40 milliGRAM(s) SubCutaneous daily  insulin glargine Injectable (LANTUS) 10 Unit(s) SubCutaneous at bedtime  insulin lispro (HumaLOG) corrective regimen sliding scale   SubCutaneous Before meals and at bedtime    MEDICATIONS  (PRN):  dextrose Gel 1 Dose(s) Oral once PRN Blood Glucose LESS THAN 70 milliGRAM(s)/deciLiter  glucagon  Injectable 1 milliGRAM(s) IntraMuscular once PRN Glucose <70 milliGRAM(s)/deciLiter      ASSESSMENT / PLAN:  ----------------------------------------  DKA - Insulin infusion discontinued. On subcutaneous insulin therapy. Diabetic education.    Leukocytosis - Persistent elevation in WBC, improved. No obvious source of infection noted. Respiratory panel was negative.    Anemia - Possibly dilutional. CBC to be repeated.    Hyponatremia - Sodium level normalized.    Acute kidney injury - Renal function improved with intravenous fluids.

## 2018-01-24 DIAGNOSIS — R69 ILLNESS, UNSPECIFIED: ICD-10-CM

## 2018-01-24 LAB
ANION GAP SERPL CALC-SCNC: 15 MMOL/L — SIGNIFICANT CHANGE UP (ref 5–17)
BUN SERPL-MCNC: 7 MG/DL — LOW (ref 8–20)
CALCIUM SERPL-MCNC: 8.8 MG/DL — SIGNIFICANT CHANGE UP (ref 8.6–10.2)
CHLORIDE SERPL-SCNC: 97 MMOL/L — LOW (ref 98–107)
CO2 SERPL-SCNC: 28 MMOL/L — SIGNIFICANT CHANGE UP (ref 22–29)
CREAT SERPL-MCNC: 0.35 MG/DL — LOW (ref 0.5–1.3)
GLUCOSE BLDC GLUCOMTR-MCNC: 121 MG/DL — HIGH (ref 70–99)
GLUCOSE BLDC GLUCOMTR-MCNC: 249 MG/DL — HIGH (ref 70–99)
GLUCOSE BLDC GLUCOMTR-MCNC: 249 MG/DL — HIGH (ref 70–99)
GLUCOSE BLDC GLUCOMTR-MCNC: 329 MG/DL — HIGH (ref 70–99)
GLUCOSE SERPL-MCNC: 101 MG/DL — SIGNIFICANT CHANGE UP (ref 70–115)
HCT VFR BLD CALC: 38.7 % — LOW (ref 42–52)
HGB BLD-MCNC: 13.6 G/DL — LOW (ref 14–18)
MCHC RBC-ENTMCNC: 29.5 PG — SIGNIFICANT CHANGE UP (ref 27–31)
MCHC RBC-ENTMCNC: 35.1 G/DL — SIGNIFICANT CHANGE UP (ref 32–36)
MCV RBC AUTO: 83.9 FL — SIGNIFICANT CHANGE UP (ref 80–94)
PLATELET # BLD AUTO: 136 K/UL — LOW (ref 150–400)
POTASSIUM SERPL-MCNC: 2.8 MMOL/L — CRITICAL LOW (ref 3.5–5.3)
POTASSIUM SERPL-SCNC: 2.8 MMOL/L — CRITICAL LOW (ref 3.5–5.3)
RBC # BLD: 4.61 M/UL — SIGNIFICANT CHANGE UP (ref 4.6–6.2)
RBC # FLD: 13.3 % — SIGNIFICANT CHANGE UP (ref 11–15.6)
SODIUM SERPL-SCNC: 140 MMOL/L — SIGNIFICANT CHANGE UP (ref 135–145)
WBC # BLD: 6.8 K/UL — SIGNIFICANT CHANGE UP (ref 4.8–10.8)
WBC # FLD AUTO: 6.8 K/UL — SIGNIFICANT CHANGE UP (ref 4.8–10.8)

## 2018-01-24 PROCEDURE — 99233 SBSQ HOSP IP/OBS HIGH 50: CPT

## 2018-01-24 RX ORDER — LANOLIN ALCOHOL/MO/W.PET/CERES
3 CREAM (GRAM) TOPICAL ONCE
Qty: 0 | Refills: 0 | Status: COMPLETED | OUTPATIENT
Start: 2018-01-24 | End: 2018-01-24

## 2018-01-24 RX ORDER — POTASSIUM CHLORIDE 20 MEQ
20 PACKET (EA) ORAL
Qty: 0 | Refills: 0 | Status: COMPLETED | OUTPATIENT
Start: 2018-01-24 | End: 2018-01-24

## 2018-01-24 RX ORDER — INSULIN LISPRO 100/ML
5 VIAL (ML) SUBCUTANEOUS
Qty: 0 | Refills: 0 | Status: DISCONTINUED | OUTPATIENT
Start: 2018-01-24 | End: 2018-01-25

## 2018-01-24 RX ADMIN — Medication 4: at 17:14

## 2018-01-24 RX ADMIN — Medication 20 MILLIEQUIVALENT(S): at 12:17

## 2018-01-24 RX ADMIN — Medication 3 MILLIGRAM(S): at 22:15

## 2018-01-24 RX ADMIN — ENOXAPARIN SODIUM 40 MILLIGRAM(S): 100 INJECTION SUBCUTANEOUS at 11:52

## 2018-01-24 RX ADMIN — Medication 20 MILLIEQUIVALENT(S): at 13:23

## 2018-01-24 RX ADMIN — Medication 3 UNIT(S): at 11:51

## 2018-01-24 RX ADMIN — Medication 2: at 21:45

## 2018-01-24 RX ADMIN — INSULIN GLARGINE 10 UNIT(S): 100 INJECTION, SOLUTION SUBCUTANEOUS at 21:46

## 2018-01-24 RX ADMIN — Medication 4: at 11:51

## 2018-01-24 RX ADMIN — Medication 3 UNIT(S): at 08:05

## 2018-01-24 RX ADMIN — Medication 5 UNIT(S): at 17:14

## 2018-01-24 RX ADMIN — Medication 20 MILLIEQUIVALENT(S): at 14:39

## 2018-01-24 NOTE — ADVANCED PRACTICE NURSE CONSULT - ASSESSMENT
return to see pt in am. pt is a+ox3 co 0 pain, reviewed w pt ss of hypoglycemia and treatment pt verbalized understanding. also reviewed the 2 types of insulin their different action and schedule, pt verbalized understanding. pt was educated about the use of insulin pen and was able to return demonstration of insulin pen setting testing, dosing and injection successfully while maintaining hygiene. [t was educated about alternate site also about sick day and the importance to increase bg monitoring frequency and hydration. discussed dka prevention. pt verbalized understanding.

## 2018-01-24 NOTE — DIETITIAN INITIAL EVALUATION ADULT. - PHYSICAL APPEARANCE
BMI 14.9 NFPE indicated severe muscle/fat depletion (clavicles, orbitals, temples)/emaciated/underweight

## 2018-01-24 NOTE — PROGRESS NOTE ADULT - SUBJECTIVE AND OBJECTIVE BOX
INTERVAL HPI/OVERNIGHT EVENTS:  no events  pt feels better, does not feel as weak    MEDICATIONS  (STANDING):  dextrose 5%. 1000 milliLiter(s) (50 mL/Hr) IV Continuous <Continuous>  dextrose 50% Injectable 12.5 Gram(s) IV Push once  dextrose 50% Injectable 25 Gram(s) IV Push once  dextrose 50% Injectable 25 Gram(s) IV Push once  enoxaparin Injectable 40 milliGRAM(s) SubCutaneous daily  insulin glargine Injectable (LANTUS) 10 Unit(s) SubCutaneous at bedtime  insulin lispro (HumaLOG) corrective regimen sliding scale   SubCutaneous three times a day before meals  insulin lispro (HumaLOG) corrective regimen sliding scale   SubCutaneous at bedtime  insulin lispro Injectable (HumaLOG) 5 Unit(s) SubCutaneous three times a day before meals    MEDICATIONS  (PRN):  dextrose Gel 1 Dose(s) Oral once PRN Blood Glucose LESS THAN 70 milliGRAM(s)/deciLiter  glucagon  Injectable 1 milliGRAM(s) IntraMuscular once PRN Glucose <70 milliGRAM(s)/deciLiter      Allergies  penicillin (Hives)    Review of systems: denies fever/chills/CP/palp/SOB/abd pain/N/V/D/C    Vital Signs Last 24 Hrs  T(C): 36.3 (24 Jan 2018 12:00), Max: 36.6 (24 Jan 2018 05:27)  T(F): 97.4 (24 Jan 2018 12:00), Max: 97.9 (24 Jan 2018 05:27)  HR: 60 (24 Jan 2018 12:00) (55 - 60)  BP: 120/67 (24 Jan 2018 12:00) (120/67 - 125/58)  BP(mean): --  RR: 20 (24 Jan 2018 12:00) (18 - 20)  SpO2: 100% (24 Jan 2018 12:00) (100% - 100%)    PHYSICAL EXAM:    General appearance: thin appearing male, appears younger that stated age  Eyes: Pupils equal. EOMI  Neck: Trachea midline. No thyroid enlargement.  Lungs: Normal respiratory excursion. Lungs clear.  CV: Regular cardiac rhythm. No murmur. pedal pulses intact.  Abdomen: Soft, non tender, no organomegaly or mass. (+) bowel sounds  Musculoskeletal: No cyanosis, clubbing, or edema. No pedal lesions.  Skin: dry mucous membranes. No rash. No acanthosis.  Neuro: Cranial nerves intact. Normal motor and sensory function. DTR's normal.  Psych: Normal affect, good judgement      LABS:                        13.6   6.8   )-----------( 136      ( 24 Jan 2018 08:06 )             38.7     01-24    140  |  97<L>  |  7.0<L>  ----------------------------<  101  2.8<LL>   |  28.0  |  0.35<L>    Ca    8.8      24 Jan 2018 08:06  Phos  2.3     01-23  Mg     2.0     01-23    CAPILLARY BLOOD GLUCOSE      POCT Blood Glucose.: 249 mg/dL (24 Jan 2018 11:09)  POCT Blood Glucose.: 121 mg/dL (24 Jan 2018 07:35)  POCT Blood Glucose.: 254 mg/dL (23 Jan 2018 21:33)  POCT Blood Glucose.: 173 mg/dL (23 Jan 2018 16:44)    Hemoglobin A1C, Whole Blood: >16.9 % <H> [4.0 - 5.6] (01-23-18 @ 05:28)

## 2018-01-24 NOTE — CHART NOTE - NSCHARTNOTEFT_GEN_A_CORE
Upon Nutritional Assessment by the Registered Dietitian your patient was determined to meet criteria / has evidence of the following diagnosis/diagnoses:          [ ]  Mild Protein Calorie Malnutrition        [ ]  Moderate Protein Calorie Malnutrition        [X] Severe Protein Calorie Malnutrition        [ ] Unspecified Protein Calorie Malnutrition        [ ] Underweight / BMI <19        [ ] Morbid Obesity / BMI > 40      Findings as based on:  •  Comprehensive nutrition assessment and consultation  •  Calorie counts (nutrient intake analysis)  •  Food acceptance and intake status from observations by staff  •  Follow up  •  Patient education  •  Intervention secondary to interdisciplinary rounds  •   concerns      Treatment:    The following diet has been recommended:  CONTINUE CONSISTENT CARBOHYDRATE DIET.  RX: MVI, THIAMINE, FOLIC ACID/DAILY     SEE RD INITIAL ASSESSMENT FOR DETAILS       PROVIDER Section:     By signing this assessment you are acknowledging and agree with the diagnosis/diagnoses assigned by the Registered Dietitian    Comments:

## 2018-01-24 NOTE — PROGRESS NOTE ADULT - SUBJECTIVE AND OBJECTIVE BOX
ADRIANROMY MOHAN  ----------------------------------------  The patient was seen and evaluated for DKA.  The patient is in no acute distress.  Denied any chest pain, palpitations, dyspnea, or abdominal pain.  Tolerating oral intake.    Vital Signs Last 24 Hrs  T(C): 36.6 (24 Jan 2018 05:27), Max: 37.2 (23 Jan 2018 16:00)  T(F): 97.9 (24 Jan 2018 05:27), Max: 99 (23 Jan 2018 16:00)  HR: 55 (24 Jan 2018 05:27) (55 - 74)  BP: 125/58 (24 Jan 2018 05:27) (119/55 - 125/58)  BP(mean): --  RR: 18 (24 Jan 2018 05:27) (18 - 18)  SpO2: 100% (24 Jan 2018 05:27) (98% - 100%)    CAPILLARY BLOOD GLUCOSE  POCT Blood Glucose.: 249 mg/dL (24 Jan 2018 11:09)  POCT Blood Glucose.: 121 mg/dL (24 Jan 2018 07:35)  POCT Blood Glucose.: 254 mg/dL (23 Jan 2018 21:33)  POCT Blood Glucose.: 173 mg/dL (23 Jan 2018 16:44)    PHYSICAL EXAMINATION:  ----------------------------------------  General appearance: NAD, Awake, Alert  HEENT: NCAT, Conjunctiva clear, EOMI  Neck: Supple, No JVD, No tenderness  Lungs: Clear to auscultation, Breath sound equal bilaterally, No wheezes, No rales  Cardiovascular: S1S2, Regular rhythm  Abdomen: Soft, Nontender, Nondistended, No guarding/rebound, Positive bowel sounds  Extremities: No clubbing, No cyanosis, No edema, No calf tenderness  Neuro: Strength equal bilaterally, No tremors  Psychiatric: Appropriate mood, Normal affect    LABORATORY STUDIES:  ----------------------------------------             13.6   6.8   )-----------( 136      ( 24 Jan 2018 08:06 )             38.7     01-24    140  |  97<L>  |  7.0<L>  ----------------------------<  101  2.8<LL>   |  28.0  |  0.35<L>    Ca    8.8      24 Jan 2018 08:06  Phos  2.3     01-23  Mg     2.0     01-23    Culture - Blood (collected 22 Jan 2018 10:39)  Source: .Blood Blood-Peripheral  Preliminary Report (24 Jan 2018 11:02):    No growth at 48 hours    Culture - Blood (collected 22 Jan 2018 10:39)  Source: .Blood Blood-Peripheral  Preliminary Report (24 Jan 2018 11:02):    No growth at 48 hours    MEDICATIONS  (STANDING):  dextrose 5%. 1000 milliLiter(s) (50 mL/Hr) IV Continuous <Continuous>  dextrose 50% Injectable 12.5 Gram(s) IV Push once  dextrose 50% Injectable 25 Gram(s) IV Push once  dextrose 50% Injectable 25 Gram(s) IV Push once  enoxaparin Injectable 40 milliGRAM(s) SubCutaneous daily  insulin glargine Injectable (LANTUS) 10 Unit(s) SubCutaneous at bedtime  insulin lispro (HumaLOG) corrective regimen sliding scale   SubCutaneous three times a day before meals  insulin lispro (HumaLOG) corrective regimen sliding scale   SubCutaneous at bedtime  insulin lispro Injectable (HumaLOG) 3 Unit(s) SubCutaneous three times a day before meals  potassium chloride    Tablet ER 20 milliEquivalent(s) Oral every 2 hours    MEDICATIONS  (PRN):  dextrose Gel 1 Dose(s) Oral once PRN Blood Glucose LESS THAN 70 milliGRAM(s)/deciLiter  glucagon  Injectable 1 milliGRAM(s) IntraMuscular once PRN Glucose <70 milliGRAM(s)/deciLiter      ASSESSMENT / PLAN:  ----------------------------------------  DKA / Diabetes - On subcutaneous insulin therapy. Diabetic education in progress.    Leukocytosis - Resolved. No obvious source of infection noted. Respiratory panel was negative.    Anemia - Improved.    Hyponatremia - Sodium level normalized.    Hypokalemia - Potassium supplementation. Repeat laboratory studies ordered to monitor.    Acute kidney injury - Renal function improved with intravenous fluids.

## 2018-01-24 NOTE — DIETITIAN INITIAL EVALUATION ADULT. - ETIOLOGY
related to endocrine dysfunction- new T1DM diagnosis related to inadequate protein energy intake in the setting of ETOH abuse

## 2018-01-24 NOTE — PROGRESS NOTE ADULT - ASSESSMENT
22 year old male with new onset uncontrolled T1DM admitted with DKA. DKA resolved and glucoses improved with subcutaneous insulin - glucoses still suboptimal  - cont Lantus 10 units  - agree with Humalog 5 units premeal  - cont Humalog scale  - will follow

## 2018-01-24 NOTE — ADVANCED PRACTICE NURSE CONSULT - RECOMMEDATIONS
continue diabetes self management education  pt to inject all insulin doses while in the hospital using pre filled insulin syringe and rn supervision  pls increase humalog pre meal to 5 units
continue diabetes self management education  pt to draw and inject all insulin doses while in the hospital using insulin syringe and rn supervision  also pt to demonstrate bg monitoring successfully prior to dc  please add humalog 3 unit before meals+ humalog ss  please start lantus 10 units qhs  pt will need glucometer lancets and strips for dc  cc- please task pt to diabetes wellness out pt

## 2018-01-24 NOTE — DIETITIAN INITIAL EVALUATION ADULT. - SIGNS/SYMPTOMS
as evidenced by HgbA1C 16.9%, DKA as evidenced by 19lb (15%) wt loss x 5 months, severe fat/muscle wasting, suboptimal PO intake PTA

## 2018-01-24 NOTE — DIETITIAN INITIAL EVALUATION ADULT. - OTHER INFO
Pt admitted for DKA (BG- over 500mg/dl)- new dx of T1DM. HgbA1C >16.9%. Mother has history of T1DM diagnosed around same age. Pt on cons cho diet, tolerating well- good appetite/intake 100% PO. Pt very knowledgable about food/diet per moms history. Written literature provided regarding carb counting, carb foods etc. Pt with good comprehension, made aware RD remains available. Pt being seen by CDE.

## 2018-01-25 ENCOUNTER — TRANSCRIPTION ENCOUNTER (OUTPATIENT)
Age: 23
End: 2018-01-25

## 2018-01-25 VITALS
SYSTOLIC BLOOD PRESSURE: 113 MMHG | RESPIRATION RATE: 18 BRPM | OXYGEN SATURATION: 97 % | TEMPERATURE: 98 F | HEART RATE: 83 BPM | DIASTOLIC BLOOD PRESSURE: 70 MMHG

## 2018-01-25 LAB
ANION GAP SERPL CALC-SCNC: 12 MMOL/L — SIGNIFICANT CHANGE UP (ref 5–17)
APPEARANCE UR: CLEAR — SIGNIFICANT CHANGE UP
BILIRUB UR-MCNC: NEGATIVE — SIGNIFICANT CHANGE UP
BUN SERPL-MCNC: 8 MG/DL — SIGNIFICANT CHANGE UP (ref 8–20)
CALCIUM SERPL-MCNC: 9.8 MG/DL — SIGNIFICANT CHANGE UP (ref 8.6–10.2)
CHLORIDE SERPL-SCNC: 94 MMOL/L — LOW (ref 98–107)
CO2 SERPL-SCNC: 33 MMOL/L — HIGH (ref 22–29)
COLOR SPEC: YELLOW — SIGNIFICANT CHANGE UP
CREAT SERPL-MCNC: 0.47 MG/DL — LOW (ref 0.5–1.3)
DIFF PNL FLD: NEGATIVE — SIGNIFICANT CHANGE UP
GLUCOSE BLDC GLUCOMTR-MCNC: 148 MG/DL — HIGH (ref 70–99)
GLUCOSE SERPL-MCNC: 166 MG/DL — HIGH (ref 70–115)
GLUCOSE UR QL: 1000 MG/DL
KETONES UR-MCNC: ABNORMAL
LEUKOCYTE ESTERASE UR-ACNC: NEGATIVE — SIGNIFICANT CHANGE UP
NITRITE UR-MCNC: NEGATIVE — SIGNIFICANT CHANGE UP
PH UR: 7 — SIGNIFICANT CHANGE UP (ref 5–8)
POTASSIUM SERPL-MCNC: 4.5 MMOL/L — SIGNIFICANT CHANGE UP (ref 3.5–5.3)
POTASSIUM SERPL-SCNC: 4.5 MMOL/L — SIGNIFICANT CHANGE UP (ref 3.5–5.3)
PROT UR-MCNC: NEGATIVE MG/DL — SIGNIFICANT CHANGE UP
SODIUM SERPL-SCNC: 139 MMOL/L — SIGNIFICANT CHANGE UP (ref 135–145)
SP GR SPEC: 1 — LOW (ref 1.01–1.02)
UROBILINOGEN FLD QL: NEGATIVE MG/DL — SIGNIFICANT CHANGE UP

## 2018-01-25 PROCEDURE — 96375 TX/PRO/DX INJ NEW DRUG ADDON: CPT

## 2018-01-25 PROCEDURE — 99285 EMERGENCY DEPT VISIT HI MDM: CPT | Mod: 25

## 2018-01-25 PROCEDURE — 83735 ASSAY OF MAGNESIUM: CPT

## 2018-01-25 PROCEDURE — 82962 GLUCOSE BLOOD TEST: CPT

## 2018-01-25 PROCEDURE — 87581 M.PNEUMON DNA AMP PROBE: CPT

## 2018-01-25 PROCEDURE — 99239 HOSP IP/OBS DSCHRG MGMT >30: CPT

## 2018-01-25 PROCEDURE — 82803 BLOOD GASES ANY COMBINATION: CPT

## 2018-01-25 PROCEDURE — 87486 CHLMYD PNEUM DNA AMP PROBE: CPT

## 2018-01-25 PROCEDURE — 81001 URINALYSIS AUTO W/SCOPE: CPT

## 2018-01-25 PROCEDURE — 80053 COMPREHEN METABOLIC PANEL: CPT

## 2018-01-25 PROCEDURE — 85027 COMPLETE CBC AUTOMATED: CPT

## 2018-01-25 PROCEDURE — 87633 RESP VIRUS 12-25 TARGETS: CPT

## 2018-01-25 PROCEDURE — 83690 ASSAY OF LIPASE: CPT

## 2018-01-25 PROCEDURE — 81003 URINALYSIS AUTO W/O SCOPE: CPT

## 2018-01-25 PROCEDURE — 80307 DRUG TEST PRSMV CHEM ANLYZR: CPT

## 2018-01-25 PROCEDURE — 36415 COLL VENOUS BLD VENIPUNCTURE: CPT

## 2018-01-25 PROCEDURE — 96374 THER/PROPH/DIAG INJ IV PUSH: CPT

## 2018-01-25 PROCEDURE — 93005 ELECTROCARDIOGRAM TRACING: CPT

## 2018-01-25 PROCEDURE — 83605 ASSAY OF LACTIC ACID: CPT

## 2018-01-25 PROCEDURE — 85730 THROMBOPLASTIN TIME PARTIAL: CPT

## 2018-01-25 PROCEDURE — 87798 DETECT AGENT NOS DNA AMP: CPT

## 2018-01-25 PROCEDURE — 85610 PROTHROMBIN TIME: CPT

## 2018-01-25 PROCEDURE — 87086 URINE CULTURE/COLONY COUNT: CPT

## 2018-01-25 PROCEDURE — 83036 HEMOGLOBIN GLYCOSYLATED A1C: CPT

## 2018-01-25 PROCEDURE — 36600 WITHDRAWAL OF ARTERIAL BLOOD: CPT

## 2018-01-25 PROCEDURE — 87040 BLOOD CULTURE FOR BACTERIA: CPT

## 2018-01-25 PROCEDURE — 80048 BASIC METABOLIC PNL TOTAL CA: CPT

## 2018-01-25 PROCEDURE — 84100 ASSAY OF PHOSPHORUS: CPT

## 2018-01-25 PROCEDURE — 82009 KETONE BODYS QUAL: CPT

## 2018-01-25 PROCEDURE — 71045 X-RAY EXAM CHEST 1 VIEW: CPT

## 2018-01-25 RX ORDER — INSULIN GLARGINE 100 [IU]/ML
10 INJECTION, SOLUTION SUBCUTANEOUS
Qty: 1 | Refills: 1 | OUTPATIENT
Start: 2018-01-25

## 2018-01-25 RX ORDER — INSULIN LISPRO 100/ML
5 VIAL (ML) SUBCUTANEOUS
Qty: 1 | Refills: 1 | OUTPATIENT
Start: 2018-01-25 | End: 2018-03-25

## 2018-01-25 RX ORDER — ROBINUL 0.2 MG/ML
2 INJECTION INTRAMUSCULAR; INTRAVENOUS
Qty: 500 | Refills: 0 | OUTPATIENT
Start: 2018-01-25

## 2018-01-25 RX ORDER — INSULIN LISPRO 100/ML
5 VIAL (ML) SUBCUTANEOUS
Qty: 1 | Refills: 1 | OUTPATIENT
Start: 2018-01-25

## 2018-01-25 RX ORDER — INSULIN ASPART 100 [IU]/ML
5 INJECTION, SOLUTION SUBCUTANEOUS
Qty: 1 | Refills: 1 | OUTPATIENT
Start: 2018-01-25

## 2018-01-25 RX ORDER — ENOXAPARIN SODIUM 100 MG/ML
20 INJECTION SUBCUTANEOUS
Qty: 1 | Refills: 1 | OUTPATIENT
Start: 2018-01-25 | End: 2018-03-25

## 2018-01-25 RX ADMIN — Medication 5 UNIT(S): at 08:22

## 2018-01-25 NOTE — DISCHARGE NOTE ADULT - MEDICATION SUMMARY - MEDICATIONS TO TAKE
I will START or STAY ON the medications listed below when I get home from the hospital:    Glucometer as per insurance  -- 1   4 times a day   -- Indication: For DM    Glucometer Test Strips as per insurance  -- 1 each  4 times a day   -- Indication: For DM    Lancets  -- 1 each  4 times a day   -- Indication: For DM    BD Nissa Neeles, 4mm 32G  -- 1 unit(s) subcutaneous 4 times a day   -- Indication: For DM    HumaLOG KwikPen 100 units/mL injectable solution  -- 5 unit(s) injectable 3 times a day (before meals)   -- Indication: For DM    Basaglar KwikPen 100 units/mL subcutaneous solution  -- 10 unit(s) subcutaneous once a day (at bedtime)   -- Do not drink alcoholic beverages when taking this medication.  It is very important that you take or use this exactly as directed.  Do not skip doses or discontinue unless directed by your doctor.  Keep in refrigerator.  Do not freeze.    -- Indication: For DM

## 2018-01-25 NOTE — DISCHARGE NOTE ADULT - PLAN OF CARE
. Continue on subcutaneous insulin coverage. Follow up with Endocrine for further management. Smoking cessation.

## 2018-01-25 NOTE — DISCHARGE NOTE ADULT - CARE PLAN
Principal Discharge DX:	Diabetic ketoacidosis without coma associated with other specified diabetes mellitus  Goal:	.  Assessment and plan of treatment:	Continue on subcutaneous insulin coverage. Follow up with Endocrine for further management. Smoking cessation.

## 2018-01-25 NOTE — PROGRESS NOTE ADULT - SUBJECTIVE AND OBJECTIVE BOX
ADRIANROMY MOHAN  ----------------------------------------  The patient was seen and evaluated for DKA.  The patient is in no acute distress.  Denied any chest pain, palpitations, dyspnea, or abdominal pain.    Vital Signs Last 24 Hrs  T(C): 36.8 (2018 08:00), Max: 37.2 (2018 20:21)  T(F): 98.2 (2018 08:00), Max: 99 (2018 20:21)  HR: 62 (2018 08:00) (59 - 70)  BP: 116/74 (2018 08:00) (112/72 - 122/70)  BP(mean): --  RR: 18 (2018 08:00) (18 - 20)  SpO2: 100% (2018 08:00) (98% - 100%)    CAPILLARY BLOOD GLUCOSE  POCT Blood Glucose.: 148 mg/dL (2018 07:47)  POCT Blood Glucose.: 329 mg/dL (2018 21:44)  POCT Blood Glucose.: 249 mg/dL (2018 16:42)  POCT Blood Glucose.: 249 mg/dL (2018 11:09)    PHYSICAL EXAMINATION:  ----------------------------------------  General appearance: NAD, Awake, Alert  HEENT: NCAT, Conjunctiva clear, EOMI  Neck: Supple, No JVD, No tenderness  Lungs: Clear to auscultation, Breath sound equal bilaterally, No wheezes, No rales  Cardiovascular: S1S2, Regular rhythm  Abdomen: Soft, Nontender, Nondistended, No guarding/rebound, Positive bowel sounds  Extremities: No clubbing, No cyanosis, No edema, No calf tenderness  Neuro: Strength equal bilaterally, No tremors  Psychiatric: Appropriate mood, Normal affect    LABORATORY STUDIES:  ----------------------------------------             13.6   6.8   )-----------( 136      ( 2018 08:06 )             38.7     01-24    140  |  97<L>  |  7.0<L>  ----------------------------<  101  2.8<LL>   |  28.0  |  0.35<L>    Ca    8.8      2018 08:06    Urinalysis Basic - ( 2018 07:26 )  Color: Yellow / Appearance: Clear / S.005 / pH: x  Gluc: x / Ketone: Small  / Bili: Negative / Urobili: Negative mg/dL   Blood: x / Protein: Negative mg/dL / Nitrite: Negative   Leuk Esterase: Negative / RBC: x / WBC x   Sq Epi: x / Non Sq Epi: x / Bacteria: x    Culture - Blood (collected 2018 10:39)  Source: .Blood Blood-Peripheral  Preliminary Report (2018 11:02):    No growth at 48 hours    Culture - Blood (collected 2018 10:39)  Source: .Blood Blood-Peripheral  Preliminary Report (2018 11:02):    No growth at 48 hours    MEDICATIONS  (STANDING):  dextrose 5%. 1000 milliLiter(s) (50 mL/Hr) IV Continuous <Continuous>  dextrose 50% Injectable 12.5 Gram(s) IV Push once  dextrose 50% Injectable 25 Gram(s) IV Push once  dextrose 50% Injectable 25 Gram(s) IV Push once  enoxaparin Injectable 40 milliGRAM(s) SubCutaneous daily  insulin glargine Injectable (LANTUS) 10 Unit(s) SubCutaneous at bedtime  insulin lispro (HumaLOG) corrective regimen sliding scale   SubCutaneous three times a day before meals  insulin lispro (HumaLOG) corrective regimen sliding scale   SubCutaneous at bedtime  insulin lispro Injectable (HumaLOG) 5 Unit(s) SubCutaneous three times a day before meals    MEDICATIONS  (PRN):  dextrose Gel 1 Dose(s) Oral once PRN Blood Glucose LESS THAN 70 milliGRAM(s)/deciLiter  glucagon  Injectable 1 milliGRAM(s) IntraMuscular once PRN Glucose <70 milliGRAM(s)/deciLiter      ASSESSMENT / PLAN:  ----------------------------------------  DKA / Diabetes - On subcutaneous insulin therapy. Diabetic education in progress. Tolerating oral intake.    Leukocytosis - Resolved. No obvious source of infection noted. Respiratory panel was negative.    Anemia - Improved.    Hyponatremia - Sodium level normalized.    Hypokalemia - Potassium supplementation. Repeat laboratory studies ordered to monitor.    Acute kidney injury - Renal function improved with intravenous fluids.

## 2018-01-25 NOTE — DISCHARGE NOTE ADULT - HOSPITAL COURSE
22M presented with nausea and vomiting. He also had a one week history of polydipsia and polyuria. On presentation, WBC(26.3), Na(130), Bicarb(<6), BUN/Cr(18/1.35), Gluc(516). Toxicology screen was positive for THC. Respiratory panel was negative. The patient was admitted to the intensive care unit for diabetic ketoacidosis. Intravenous fluids and insulin was initiated. Repeat laboratory results noted improvement in the anion gap and resolution of the hyponatremia and acute kidney injury. Hba1c(>16.9). The insulin infusion was discontinued and subcutaneous insulin therapy was initiated. The patient was started on an oral diet which he tolerated well. Repeat laboratory results noted recurrent hypokalemia and potassium was supplemented. The patient was seen by Nutrition and thought to have severe protein calorie malnutrition. He was tolerant of oral intake and expressed understanding of insulin use. The patient was discharged home with instructions to follow up with Endocrine for further management.    35 minutes total time

## 2018-01-25 NOTE — DISCHARGE NOTE ADULT - CARE PROVIDER_API CALL
Alie Wallace (DO), EndocrinologyMetabDiabetes; Internal Medicine  [address] [place] [address]  Phone: [phone]  Fax: [phone]

## 2018-01-25 NOTE — DISCHARGE NOTE ADULT - PATIENT PORTAL LINK FT
“You can access the [employer] Patient Portal, offered by [facility], by registering with the following website: [email or web address]”

## 2018-01-26 LAB
CULTURE RESULTS: NO GROWTH — SIGNIFICANT CHANGE UP
SPECIMEN SOURCE: SIGNIFICANT CHANGE UP

## 2018-01-27 LAB
CULTURE RESULTS: SIGNIFICANT CHANGE UP
CULTURE RESULTS: SIGNIFICANT CHANGE UP
SPECIMEN SOURCE: SIGNIFICANT CHANGE UP
SPECIMEN SOURCE: SIGNIFICANT CHANGE UP

## 2018-02-01 RX ORDER — INSULIN GLARGINE 100 [IU]/ML
25 INJECTION, SOLUTION SUBCUTANEOUS
Qty: 1 | Refills: 1 | OUTPATIENT
Start: 2018-02-01

## 2018-02-11 ENCOUNTER — TRANSCRIPTION ENCOUNTER (OUTPATIENT)
Age: 23
End: 2018-02-11

## 2018-05-01 ENCOUNTER — OUTPATIENT (OUTPATIENT)
Dept: OUTPATIENT SERVICES | Facility: HOSPITAL | Age: 23
LOS: 1 days | End: 2018-05-01
Payer: MEDICAID

## 2018-05-01 PROCEDURE — G9001: CPT

## 2018-05-02 DIAGNOSIS — R69 ILLNESS, UNSPECIFIED: ICD-10-CM

## 2018-05-02 PROBLEM — F10.20 ALCOHOL DEPENDENCE, UNCOMPLICATED: Chronic | Status: ACTIVE | Noted: 2018-01-22

## 2018-09-17 PROBLEM — Z00.00 ENCOUNTER FOR PREVENTIVE HEALTH EXAMINATION: Status: ACTIVE | Noted: 2018-09-17

## 2018-10-29 ENCOUNTER — INPATIENT (INPATIENT)
Facility: HOSPITAL | Age: 23
LOS: 1 days | Discharge: HOME | DRG: 639 | End: 2018-10-31
Attending: HOSPITALIST | Admitting: HOSPITALIST
Payer: MEDICAID

## 2018-10-29 VITALS
WEIGHT: 119.93 LBS | RESPIRATION RATE: 30 BRPM | OXYGEN SATURATION: 100 % | TEMPERATURE: 97 F | DIASTOLIC BLOOD PRESSURE: 99 MMHG | SYSTOLIC BLOOD PRESSURE: 157 MMHG | HEIGHT: 71 IN | HEART RATE: 124 BPM

## 2018-10-29 DIAGNOSIS — E13.10 OTHER SPECIFIED DIABETES MELLITUS WITH KETOACIDOSIS WITHOUT COMA: ICD-10-CM

## 2018-10-29 LAB
ACETONE SERPL-MCNC: ABNORMAL
ALBUMIN SERPL ELPH-MCNC: 4.7 G/DL — SIGNIFICANT CHANGE UP (ref 3.3–5.2)
ALP SERPL-CCNC: 93 U/L — SIGNIFICANT CHANGE UP (ref 40–120)
ALT FLD-CCNC: 12 U/L — SIGNIFICANT CHANGE UP
ANION GAP SERPL CALC-SCNC: >32 MMOL/L — HIGH (ref 5–17)
APPEARANCE UR: CLEAR — SIGNIFICANT CHANGE UP
AST SERPL-CCNC: 13 U/L — SIGNIFICANT CHANGE UP
BASOPHILS NFR BLD AUTO: 1 % — SIGNIFICANT CHANGE UP (ref 0–2)
BILIRUB SERPL-MCNC: 0.3 MG/DL — LOW (ref 0.4–2)
BILIRUB UR-MCNC: NEGATIVE — SIGNIFICANT CHANGE UP
BUN SERPL-MCNC: 17 MG/DL — SIGNIFICANT CHANGE UP (ref 8–20)
CALCIUM SERPL-MCNC: 9.6 MG/DL — SIGNIFICANT CHANGE UP (ref 8.6–10.2)
CHLORIDE SERPL-SCNC: 92 MMOL/L — LOW (ref 98–107)
CO2 SERPL-SCNC: <6 MMOL/L — CRITICAL LOW (ref 22–29)
COLOR SPEC: YELLOW — SIGNIFICANT CHANGE UP
CREAT SERPL-MCNC: 1.01 MG/DL — SIGNIFICANT CHANGE UP (ref 0.5–1.3)
DIFF PNL FLD: ABNORMAL
GAS PNL BLDV: SIGNIFICANT CHANGE UP
GLUCOSE BLDC GLUCOMTR-MCNC: 202 MG/DL — HIGH (ref 70–99)
GLUCOSE BLDC GLUCOMTR-MCNC: 225 MG/DL — HIGH (ref 70–99)
GLUCOSE SERPL-MCNC: 477 MG/DL — HIGH (ref 70–115)
GLUCOSE UR QL: 1000 MG/DL
HCO3 BLDV-SCNC: 7 MMOL/L — LOW (ref 21–29)
HCT VFR BLD CALC: 47.6 % — SIGNIFICANT CHANGE UP (ref 42–52)
HGB BLD-MCNC: 16.3 G/DL — SIGNIFICANT CHANGE UP (ref 14–18)
KETONES UR-MCNC: ABNORMAL
LEUKOCYTE ESTERASE UR-ACNC: NEGATIVE — SIGNIFICANT CHANGE UP
LYMPHOCYTES # BLD AUTO: 9 % — LOW (ref 20–55)
MAGNESIUM SERPL-MCNC: 2.1 MG/DL — SIGNIFICANT CHANGE UP (ref 1.6–2.6)
MCHC RBC-ENTMCNC: 32.1 PG — HIGH (ref 27–31)
MCHC RBC-ENTMCNC: 34.2 G/DL — SIGNIFICANT CHANGE UP (ref 32–36)
MCV RBC AUTO: 93.9 FL — SIGNIFICANT CHANGE UP (ref 80–94)
MONOCYTES NFR BLD AUTO: 4 % — SIGNIFICANT CHANGE UP (ref 3–10)
NEUTROPHILS NFR BLD AUTO: 86 % — HIGH (ref 37–73)
NITRITE UR-MCNC: NEGATIVE — SIGNIFICANT CHANGE UP
PCO2 BLDV: 20 MMHG — LOW (ref 35–50)
PH BLDV: 6.91 — CRITICAL LOW (ref 7.32–7.43)
PH UR: 5 — SIGNIFICANT CHANGE UP (ref 5–8)
PHOSPHATE SERPL-MCNC: 4.6 MG/DL — SIGNIFICANT CHANGE UP (ref 2.4–4.7)
PLATELET # BLD AUTO: 273 K/UL — SIGNIFICANT CHANGE UP (ref 150–400)
PO2 BLDV: 76 MMHG — HIGH (ref 25–45)
POTASSIUM SERPL-MCNC: 5 MMOL/L — SIGNIFICANT CHANGE UP (ref 3.5–5.3)
POTASSIUM SERPL-SCNC: 5 MMOL/L — SIGNIFICANT CHANGE UP (ref 3.5–5.3)
PROT SERPL-MCNC: 7.7 G/DL — SIGNIFICANT CHANGE UP (ref 6.6–8.7)
PROT UR-MCNC: 30 MG/DL
RBC # BLD: 5.07 M/UL — SIGNIFICANT CHANGE UP (ref 4.6–6.2)
RBC # FLD: 12.7 % — SIGNIFICANT CHANGE UP (ref 11–15.6)
SAO2 % BLDV: 92 % — SIGNIFICANT CHANGE UP
SODIUM SERPL-SCNC: 130 MMOL/L — LOW (ref 135–145)
SP GR SPEC: 1.02 — SIGNIFICANT CHANGE UP (ref 1.01–1.02)
UROBILINOGEN FLD QL: NEGATIVE MG/DL — SIGNIFICANT CHANGE UP
WBC # BLD: 24.9 K/UL — HIGH (ref 4.8–10.8)
WBC # FLD AUTO: 24.9 K/UL — HIGH (ref 4.8–10.8)

## 2018-10-29 PROCEDURE — 93010 ELECTROCARDIOGRAM REPORT: CPT

## 2018-10-29 PROCEDURE — 71045 X-RAY EXAM CHEST 1 VIEW: CPT | Mod: 26

## 2018-10-29 PROCEDURE — 99291 CRITICAL CARE FIRST HOUR: CPT

## 2018-10-29 RX ORDER — SODIUM CHLORIDE 9 MG/ML
1000 INJECTION, SOLUTION INTRAVENOUS
Qty: 0 | Refills: 0 | Status: DISCONTINUED | OUTPATIENT
Start: 2018-10-29 | End: 2018-10-30

## 2018-10-29 RX ORDER — SODIUM CHLORIDE 9 MG/ML
2000 INJECTION INTRAMUSCULAR; INTRAVENOUS; SUBCUTANEOUS ONCE
Qty: 0 | Refills: 0 | Status: COMPLETED | OUTPATIENT
Start: 2018-10-29 | End: 2018-10-29

## 2018-10-29 RX ORDER — INFLUENZA VIRUS VACCINE 15; 15; 15; 15 UG/.5ML; UG/.5ML; UG/.5ML; UG/.5ML
0.5 SUSPENSION INTRAMUSCULAR ONCE
Qty: 0 | Refills: 0 | Status: COMPLETED | OUTPATIENT
Start: 2018-10-29 | End: 2018-10-29

## 2018-10-29 RX ORDER — ENOXAPARIN SODIUM 100 MG/ML
40 INJECTION SUBCUTANEOUS DAILY
Qty: 0 | Refills: 0 | Status: DISCONTINUED | OUTPATIENT
Start: 2018-10-29 | End: 2018-10-31

## 2018-10-29 RX ORDER — INSULIN HUMAN 100 [IU]/ML
5 INJECTION, SOLUTION SUBCUTANEOUS ONCE
Qty: 0 | Refills: 0 | Status: COMPLETED | OUTPATIENT
Start: 2018-10-29 | End: 2018-10-29

## 2018-10-29 RX ORDER — INSULIN HUMAN 100 [IU]/ML
1 INJECTION, SOLUTION SUBCUTANEOUS
Qty: 100 | Refills: 0 | Status: DISCONTINUED | OUTPATIENT
Start: 2018-10-29 | End: 2018-10-30

## 2018-10-29 RX ADMIN — Medication 100 MILLIGRAM(S): at 19:14

## 2018-10-29 RX ADMIN — SODIUM CHLORIDE 2000 MILLILITER(S): 9 INJECTION INTRAMUSCULAR; INTRAVENOUS; SUBCUTANEOUS at 19:23

## 2018-10-29 RX ADMIN — INSULIN HUMAN 5 UNIT(S): 100 INJECTION, SOLUTION SUBCUTANEOUS at 19:24

## 2018-10-29 RX ADMIN — SODIUM CHLORIDE 2000 MILLILITER(S): 9 INJECTION INTRAMUSCULAR; INTRAVENOUS; SUBCUTANEOUS at 17:55

## 2018-10-29 RX ADMIN — INSULIN HUMAN 5 UNIT(S)/HR: 100 INJECTION, SOLUTION SUBCUTANEOUS at 19:25

## 2018-10-29 RX ADMIN — SODIUM CHLORIDE 150 MILLILITER(S): 9 INJECTION, SOLUTION INTRAVENOUS at 20:39

## 2018-10-29 NOTE — ED ADULT NURSE REASSESSMENT NOTE - NS ED NURSE REASSESS COMMENT FT1
assumed care of patient 1950, charting as noted. pt alert and oriemnted x4 family at the bedside. pt sitting up in bed talking watchign tv. pt on RA saturating 100%. pt states he is starting to feel better. insulin drip infusing. pt educated on plan of care, pt able to successfully teach back plan of care to RN, RN will continue to reeducate pt during hospital stay.

## 2018-10-29 NOTE — ED ADULT NURSE NOTE - NSIMPLEMENTINTERV_GEN_ALL_ED
Implemented All Universal Safety Interventions:  [place] to call system. Call bell, personal items and telephone within reach. Instruct patient to call for assistance. Room bathroom lighting operational. Non-slip footwear when patient is off stretcher. Physically safe environment: no spills, clutter or unnecessary equipment. Stretcher in lowest position, wheels locked, appropriate side rails in place.

## 2018-10-29 NOTE — H&P ADULT - HISTORY OF PRESENT ILLNESS
23M hx DM type 1 (dx'd Jan 2018).      Admit with DKA      He had a tooth ache yesterday and he was not able to eat so he did not take his insulin.   He had a few episodes of emesis

## 2018-10-29 NOTE — H&P ADULT - ASSESSMENT
23M type 1 DM with DKA.  Due to non compliance     Had a tooth ache.  No visible abscess.  no pint tenderness, but this may have tipped him over the edge too.

## 2018-10-29 NOTE — PROVIDER CONTACT NOTE (EICU) - RECOMMENDATIONS
c/w insulin drip, await repeat BNP  f/u AG. FS have improved to 200s and is on D 5 1/2 NS. will need some long acting insulin when gap has closed. Says he takes 5u glargine at home plus a sliding scale. agree with clinda for dental abscess and dental eval. D/W NEIDA farah.

## 2018-10-29 NOTE — ED PROVIDER NOTE - OBJECTIVE STATEMENT
This patient is a 23 year old hx of Type 1 DM who presents to the ER reporting dehydration, thirst and SOB.  EMS states that the original call was for chest pain.  Patient was noted to be tachypneic on their arrival.  He reports that he has not properly been taking his medication for the past 4 days.  He states that the symptoms he has now are similar to when he had DKA in the past.

## 2018-10-29 NOTE — H&P ADULT - NSHPPHYSICALEXAM_GEN_ALL_CORE
underweight  clear lungs  abd benign   ext no edema  no wounds    I do not see any visible tooth infection.  no point tenderness along the alveolar ridges where he had pain yesterday

## 2018-10-29 NOTE — ED ADULT TRIAGE NOTE - CHIEF COMPLAINT QUOTE
Patient arrived to ED today with c/o chest pains.  Patient is tachycardic, respiratory rate is 30.  Patients sugars have also been high at home.  Code sepsis called, MD Coto at bedside.

## 2018-10-29 NOTE — ED ADULT NURSE NOTE - OBJECTIVE STATEMENT
Patient is alert and verbal, reports sob, chest pain, nausea onset 2 days ago. patient states he felt horrible and was unable to do anything." hx;p type 1 DM. . Code team and MD at bedside.

## 2018-10-29 NOTE — ED PROVIDER NOTE - MEDICAL DECISION MAKING DETAILS
Patient with weakness, increase thirst and dehydration.  Patient ill appearing, tachypneic possibly in DKA.  Will given IV and PO fluids, check Labs, CXR and Admit.

## 2018-10-30 ENCOUNTER — TRANSCRIPTION ENCOUNTER (OUTPATIENT)
Age: 23
End: 2018-10-30

## 2018-10-30 ENCOUNTER — RECORD ABSTRACTING (OUTPATIENT)
Age: 23
End: 2018-10-30

## 2018-10-30 DIAGNOSIS — K08.89 OTHER SPECIFIED DISORDERS OF TEETH AND SUPPORTING STRUCTURES: ICD-10-CM

## 2018-10-30 DIAGNOSIS — E13.10 OTHER SPECIFIED DIABETES MELLITUS WITH KETOACIDOSIS WITHOUT COMA: ICD-10-CM

## 2018-10-30 DIAGNOSIS — Z86.39 PERSONAL HISTORY OF OTHER ENDOCRINE, NUTRITIONAL AND METABOLIC DISEASE: ICD-10-CM

## 2018-10-30 DIAGNOSIS — E10.9 TYPE 1 DIABETES MELLITUS WITHOUT COMPLICATIONS: ICD-10-CM

## 2018-10-30 LAB
ALBUMIN SERPL ELPH-MCNC: 4 G/DL — SIGNIFICANT CHANGE UP (ref 3.3–5.2)
ALBUMIN SERPL ELPH-MCNC: 4.2 G/DL — SIGNIFICANT CHANGE UP (ref 3.3–5.2)
ALP SERPL-CCNC: 71 U/L — SIGNIFICANT CHANGE UP (ref 40–120)
ALP SERPL-CCNC: 75 U/L — SIGNIFICANT CHANGE UP (ref 40–120)
ALT FLD-CCNC: 11 U/L — SIGNIFICANT CHANGE UP
ALT FLD-CCNC: 9 U/L — SIGNIFICANT CHANGE UP
ANION GAP SERPL CALC-SCNC: 13 MMOL/L — SIGNIFICANT CHANGE UP (ref 5–17)
ANION GAP SERPL CALC-SCNC: 24 MMOL/L — HIGH (ref 5–17)
AST SERPL-CCNC: 11 U/L — SIGNIFICANT CHANGE UP
AST SERPL-CCNC: 11 U/L — SIGNIFICANT CHANGE UP
BASOPHILS # BLD AUTO: 0 K/UL — SIGNIFICANT CHANGE UP (ref 0–0.2)
BASOPHILS NFR BLD AUTO: 0.1 % — SIGNIFICANT CHANGE UP (ref 0–2)
BILIRUB SERPL-MCNC: 0.4 MG/DL — SIGNIFICANT CHANGE UP (ref 0.4–2)
BILIRUB SERPL-MCNC: 0.4 MG/DL — SIGNIFICANT CHANGE UP (ref 0.4–2)
BUN SERPL-MCNC: 10 MG/DL — SIGNIFICANT CHANGE UP (ref 8–20)
BUN SERPL-MCNC: 11 MG/DL — SIGNIFICANT CHANGE UP (ref 8–20)
CALCIUM SERPL-MCNC: 9.4 MG/DL — SIGNIFICANT CHANGE UP (ref 8.6–10.2)
CALCIUM SERPL-MCNC: 9.6 MG/DL — SIGNIFICANT CHANGE UP (ref 8.6–10.2)
CHLORIDE SERPL-SCNC: 104 MMOL/L — SIGNIFICANT CHANGE UP (ref 98–107)
CHLORIDE SERPL-SCNC: 97 MMOL/L — LOW (ref 98–107)
CO2 SERPL-SCNC: 10 MMOL/L — CRITICAL LOW (ref 22–29)
CO2 SERPL-SCNC: 17 MMOL/L — LOW (ref 22–29)
CREAT SERPL-MCNC: 0.6 MG/DL — SIGNIFICANT CHANGE UP (ref 0.5–1.3)
CREAT SERPL-MCNC: 0.71 MG/DL — SIGNIFICANT CHANGE UP (ref 0.5–1.3)
EOSINOPHIL # BLD AUTO: 0 K/UL — SIGNIFICANT CHANGE UP (ref 0–0.5)
EOSINOPHIL NFR BLD AUTO: 0.2 % — SIGNIFICANT CHANGE UP (ref 0–6)
GLUCOSE BLDC GLUCOMTR-MCNC: 128 MG/DL — HIGH (ref 70–99)
GLUCOSE BLDC GLUCOMTR-MCNC: 134 MG/DL — HIGH (ref 70–99)
GLUCOSE BLDC GLUCOMTR-MCNC: 166 MG/DL — HIGH (ref 70–99)
GLUCOSE BLDC GLUCOMTR-MCNC: 172 MG/DL — HIGH (ref 70–99)
GLUCOSE BLDC GLUCOMTR-MCNC: 188 MG/DL — HIGH (ref 70–99)
GLUCOSE BLDC GLUCOMTR-MCNC: 230 MG/DL — HIGH (ref 70–99)
GLUCOSE BLDC GLUCOMTR-MCNC: 233 MG/DL — HIGH (ref 70–99)
GLUCOSE BLDC GLUCOMTR-MCNC: 244 MG/DL — HIGH (ref 70–99)
GLUCOSE BLDC GLUCOMTR-MCNC: 263 MG/DL — HIGH (ref 70–99)
GLUCOSE BLDC GLUCOMTR-MCNC: 277 MG/DL — HIGH (ref 70–99)
GLUCOSE SERPL-MCNC: 142 MG/DL — HIGH (ref 70–115)
GLUCOSE SERPL-MCNC: 193 MG/DL — HIGH (ref 70–115)
HCT VFR BLD CALC: 39.3 % — LOW (ref 42–52)
HGB BLD-MCNC: 13.9 G/DL — LOW (ref 14–18)
LYMPHOCYTES # BLD AUTO: 16.2 % — LOW (ref 20–55)
LYMPHOCYTES # BLD AUTO: 2.6 K/UL — SIGNIFICANT CHANGE UP (ref 1–4.8)
MAGNESIUM SERPL-MCNC: 1.7 MG/DL — SIGNIFICANT CHANGE UP (ref 1.6–2.6)
MAGNESIUM SERPL-MCNC: 1.8 MG/DL — SIGNIFICANT CHANGE UP (ref 1.6–2.6)
MCHC RBC-ENTMCNC: 31.7 PG — HIGH (ref 27–31)
MCHC RBC-ENTMCNC: 35.4 G/DL — SIGNIFICANT CHANGE UP (ref 32–36)
MCV RBC AUTO: 89.5 FL — SIGNIFICANT CHANGE UP (ref 80–94)
MONOCYTES # BLD AUTO: 1.5 K/UL — HIGH (ref 0–0.8)
MONOCYTES NFR BLD AUTO: 9.2 % — SIGNIFICANT CHANGE UP (ref 3–10)
NEUTROPHILS # BLD AUTO: 12 K/UL — HIGH (ref 1.8–8)
NEUTROPHILS NFR BLD AUTO: 73.9 % — HIGH (ref 37–73)
PHOSPHATE SERPL-MCNC: 2.2 MG/DL — LOW (ref 2.4–4.7)
PHOSPHATE SERPL-MCNC: 2.3 MG/DL — LOW (ref 2.4–4.7)
PLATELET # BLD AUTO: 213 K/UL — SIGNIFICANT CHANGE UP (ref 150–400)
POTASSIUM SERPL-MCNC: 3.3 MMOL/L — LOW (ref 3.5–5.3)
POTASSIUM SERPL-MCNC: 4.8 MMOL/L — SIGNIFICANT CHANGE UP (ref 3.5–5.3)
POTASSIUM SERPL-SCNC: 3.3 MMOL/L — LOW (ref 3.5–5.3)
POTASSIUM SERPL-SCNC: 4.8 MMOL/L — SIGNIFICANT CHANGE UP (ref 3.5–5.3)
PROT SERPL-MCNC: 6.3 G/DL — LOW (ref 6.6–8.7)
PROT SERPL-MCNC: 6.5 G/DL — LOW (ref 6.6–8.7)
RBC # BLD: 4.39 M/UL — LOW (ref 4.6–6.2)
RBC # FLD: 12.2 % — SIGNIFICANT CHANGE UP (ref 11–15.6)
SODIUM SERPL-SCNC: 131 MMOL/L — LOW (ref 135–145)
SODIUM SERPL-SCNC: 134 MMOL/L — LOW (ref 135–145)
WBC # BLD: 16.2 K/UL — HIGH (ref 4.8–10.8)
WBC # FLD AUTO: 16.2 K/UL — HIGH (ref 4.8–10.8)

## 2018-10-30 PROCEDURE — 99222 1ST HOSP IP/OBS MODERATE 55: CPT

## 2018-10-30 RX ORDER — DEXTROSE 50 % IN WATER 50 %
25 SYRINGE (ML) INTRAVENOUS ONCE
Qty: 0 | Refills: 0 | Status: DISCONTINUED | OUTPATIENT
Start: 2018-10-30 | End: 2018-10-31

## 2018-10-30 RX ORDER — GLUCAGON INJECTION, SOLUTION 0.5 MG/.1ML
1 INJECTION, SOLUTION SUBCUTANEOUS ONCE
Qty: 0 | Refills: 0 | Status: DISCONTINUED | OUTPATIENT
Start: 2018-10-30 | End: 2018-10-31

## 2018-10-30 RX ORDER — LANOLIN ALCOHOL/MO/W.PET/CERES
3 CREAM (GRAM) TOPICAL ONCE
Qty: 0 | Refills: 0 | Status: COMPLETED | OUTPATIENT
Start: 2018-10-30 | End: 2018-10-30

## 2018-10-30 RX ORDER — MAGNESIUM HYDROXIDE 400 MG/1
30 TABLET, CHEWABLE ORAL ONCE
Qty: 0 | Refills: 0 | Status: COMPLETED | OUTPATIENT
Start: 2018-10-30 | End: 2018-10-31

## 2018-10-30 RX ORDER — DEXTROSE 50 % IN WATER 50 %
12.5 SYRINGE (ML) INTRAVENOUS ONCE
Qty: 0 | Refills: 0 | Status: DISCONTINUED | OUTPATIENT
Start: 2018-10-30 | End: 2018-10-31

## 2018-10-30 RX ORDER — DEXTROSE 50 % IN WATER 50 %
15 SYRINGE (ML) INTRAVENOUS ONCE
Qty: 0 | Refills: 0 | Status: DISCONTINUED | OUTPATIENT
Start: 2018-10-30 | End: 2018-10-31

## 2018-10-30 RX ORDER — INSULIN GLARGINE 100 [IU]/ML
10 INJECTION, SOLUTION SUBCUTANEOUS AT BEDTIME
Qty: 0 | Refills: 0 | Status: DISCONTINUED | OUTPATIENT
Start: 2018-10-31 | End: 2018-10-31

## 2018-10-30 RX ORDER — SODIUM,POTASSIUM PHOSPHATES 278-250MG
1 POWDER IN PACKET (EA) ORAL
Qty: 0 | Refills: 0 | Status: COMPLETED | OUTPATIENT
Start: 2018-10-30 | End: 2018-10-30

## 2018-10-30 RX ORDER — SODIUM CHLORIDE 9 MG/ML
1000 INJECTION, SOLUTION INTRAVENOUS
Qty: 0 | Refills: 0 | Status: DISCONTINUED | OUTPATIENT
Start: 2018-10-30 | End: 2018-10-31

## 2018-10-30 RX ORDER — URINE ACETONE TEST STRIPS
STRIP MISCELLANEOUS
Refills: 0 | Status: ACTIVE | COMMUNITY

## 2018-10-30 RX ORDER — INSULIN LISPRO 100/ML
VIAL (ML) SUBCUTANEOUS AT BEDTIME
Qty: 0 | Refills: 0 | Status: DISCONTINUED | OUTPATIENT
Start: 2018-10-30 | End: 2018-10-31

## 2018-10-30 RX ORDER — INSULIN LISPRO 100/ML
VIAL (ML) SUBCUTANEOUS
Qty: 0 | Refills: 0 | Status: DISCONTINUED | OUTPATIENT
Start: 2018-10-30 | End: 2018-10-31

## 2018-10-30 RX ORDER — INSULIN GLARGINE 100 [IU]/ML
10 INJECTION, SOLUTION SUBCUTANEOUS ONCE
Qty: 0 | Refills: 0 | Status: COMPLETED | OUTPATIENT
Start: 2018-10-30 | End: 2018-10-30

## 2018-10-30 RX ORDER — POTASSIUM CHLORIDE 20 MEQ
40 PACKET (EA) ORAL EVERY 4 HOURS
Qty: 0 | Refills: 0 | Status: COMPLETED | OUTPATIENT
Start: 2018-10-30 | End: 2018-10-30

## 2018-10-30 RX ADMIN — INSULIN GLARGINE 10 UNIT(S): 100 INJECTION, SOLUTION SUBCUTANEOUS at 05:03

## 2018-10-30 RX ADMIN — Medication 1: at 06:54

## 2018-10-30 RX ADMIN — SODIUM CHLORIDE 150 MILLILITER(S): 9 INJECTION, SOLUTION INTRAVENOUS at 03:11

## 2018-10-30 RX ADMIN — Medication 300 MILLIGRAM(S): at 21:00

## 2018-10-30 RX ADMIN — INSULIN HUMAN 1 UNIT(S)/HR: 100 INJECTION, SOLUTION SUBCUTANEOUS at 04:28

## 2018-10-30 RX ADMIN — SODIUM CHLORIDE 150 MILLILITER(S): 9 INJECTION, SOLUTION INTRAVENOUS at 03:20

## 2018-10-30 RX ADMIN — Medication 300 MILLIGRAM(S): at 13:00

## 2018-10-30 RX ADMIN — Medication 300 MILLIGRAM(S): at 05:03

## 2018-10-30 RX ADMIN — Medication 3 MILLIGRAM(S): at 21:23

## 2018-10-30 RX ADMIN — Medication 3: at 11:20

## 2018-10-30 RX ADMIN — Medication 1 TABLET(S): at 08:38

## 2018-10-30 RX ADMIN — Medication 40 MILLIEQUIVALENT(S): at 06:00

## 2018-10-30 RX ADMIN — Medication 1 TABLET(S): at 12:59

## 2018-10-30 RX ADMIN — Medication 40 MILLIEQUIVALENT(S): at 11:30

## 2018-10-30 RX ADMIN — INSULIN HUMAN 3 UNIT(S)/HR: 100 INJECTION, SOLUTION SUBCUTANEOUS at 03:12

## 2018-10-30 RX ADMIN — ENOXAPARIN SODIUM 40 MILLIGRAM(S): 100 INJECTION SUBCUTANEOUS at 11:20

## 2018-10-30 RX ADMIN — Medication 3: at 17:01

## 2018-10-30 NOTE — DISCHARGE NOTE ADULT - MEDICATION SUMMARY - MEDICATIONS TO TAKE
I will START or STAY ON the medications listed below when I get home from the hospital:    Glucometer as per insurance  -- 1   4 times a day   -- Indication: For Dm    Glucometer Test Strips as per insurance  -- 1 each  4 times a day   -- Indication: For Dm    Lancets  -- 1 each  4 times a day   -- Indication: For Dm    BD Nissa Neeles, 4mm 32G  -- 1 unit(s) subcutaneous 4 times a day   -- Indication: For Dm    HumaLOG KwikPen 100 units/mL injectable solution  -- 5 unit(s) injectable 3 times a day (before meals)   -- Indication: For Dm    Basaglar KwikPen 100 units/mL subcutaneous solution  -- 10 unit(s) subcutaneous once a day (at bedtime)   -- Do not drink alcoholic beverages when taking this medication.  It is very important that you take or use this exactly as directed.  Do not skip doses or discontinue unless directed by your doctor.  Keep in refrigerator.  Do not freeze.    -- Indication: For Dm    clindamycin 300 mg oral capsule  -- 1 cap(s) by mouth every 8 hours  -- Indication: For Tooth ache

## 2018-10-30 NOTE — DISCHARGE NOTE ADULT - HOSPITAL COURSE
HPI: 22 y/o w DM type 1 (dx'd Jan 2018). Admit with DKA He had a tooth ache yesterday and he was not able to eat so he did not take his insulin.   He had a few episodes of emesis (29 Oct 2018 23:57)    Vital Signs Last 24 Hrs  T(C): 36.8 (31 Oct 2018 08:00), Max: 36.8 (30 Oct 2018 16:00)  T(F): 98.2 (31 Oct 2018 08:00), Max: 98.2 (30 Oct 2018 16:00)  HR: 73 (31 Oct 2018 08:00) (73 - 99)  BP: 118/73 (31 Oct 2018 08:00) (98/64 - 124/76)  BP(mean): 76 (30 Oct 2018 15:00) (76 - 87)  RR: 18 (31 Oct 2018 08:00) (18 - 31)  SpO2: 98% (31 Oct 2018 08:00) (98% - 99%)I&O's Summary      PHYSICAL EXAM:  GENERAL: very thin, NAD, well-groomed  HEENT: PERRL, +EOMI, anicteric  NECK: Supple, No JVD   CHEST/LUNG: CTA bilaterally; Normal effort  HEART: S1S2 Normal intensity, no murmurs, gallops or rubs noted  ABDOMEN: Soft, BS Normoactive, NT, ND, no HSM noted  EXTREMITIES:  2+ radial and DP pulses noted, no clubbing, cyanosis, or edema noted, FROM x 4  SKIN: No rashes or lesions noted  NEURO: A&Ox3, no focal deficits noted, CN II-XII intact  PSYCH: normal mood and affect; insight/judgement appropriate        · Assessment		  22 y/o male with PMHx of DM-1 admitted to MICU for DKA. Patient said he had a tooth ache as a result he was unable to eat consequently not taking his insulin.  Treated with IVF, insulin in MICU. AG closed and patient downgraded to medical floor. can follow up with endocrine as an outpatient.    DKA-resolved   Continue insulin Lantus HS and sliding scale   Monitor FS- BG was approx 500 at admission, now 200-300s. Anion GAP closed,     DM I diagnosed January 2018  Hg A1C= 14  Discussed w Daksha diabetes educator, she will see the patient today     Leukocytosis  WBC: 24.9--->16.2--> 5.5  On antibiotic for possible tooth infection       Tooth ache   Continue Clindamycin 300mg q8h, pain is well controlled. Michel need referral for dental eval after discharge.     Hypokalemia   K+: normalized after supplementation     Supportive  DVT prophylaxis: ambulate as tolerated  GI prophylaxis: not indicated  Diet: consistent carbohydrate     I spent 35 min in discharging the patient

## 2018-10-30 NOTE — PROGRESS NOTE ADULT - ASSESSMENT
24 y/o male with PMHx of DM-1 admitted to MICU for DKA. Patient said he had a tooth ache as a result he was unable to eat consequently not taking his insulin.  Treated with IVF, insulin in MICU. AG closed and patient downgraded to medical floor.     DKA-resolved   Continue insulin Lantus HS and sliding scale   Monitor FS     Tooth ache   Continue Clindamycin 300mg q8h    Hypokalemia   K+: 3.3  Supplemented   Follow up K     Supportive  DVT prophylaxis: ambulate as tolerated  GI prophylaxis: not indicated  Diet: consistent carbohydrate 24 y/o male with PMHx of DM-1 admitted to MICU for DKA. Patient said he had a tooth ache as a result he was unable to eat consequently not taking his insulin.  Treated with IVF, insulin in MICU. AG closed and patient downgraded to medical floor.     DKA-resolved   Continue insulin Lantus HS and sliding scale   Monitor FS     Leukocytosis  WBC: 24.9--->16.2  On antibiotic for possible tooth infection   Monitor CBC     Tooth ache   Continue Clindamycin 300mg q8h    Hypokalemia   K+: 3.3  Supplemented   Follow up K     Supportive  DVT prophylaxis: ambulate as tolerated  GI prophylaxis: not indicated  Diet: consistent carbohydrate

## 2018-10-30 NOTE — DISCHARGE NOTE ADULT - CARE PLAN
Principal Discharge DX:	Diabetic ketoacidosis without coma associated with type 1 diabetes mellitus  Goal:	improved  Assessment and plan of treatment:	AG closed and HCO3 improved  Diabetic educator consulted and diabetic teaching given  f/u with pcp and endocrine as an outpatient  Secondary Diagnosis:	Tooth ache  Assessment and plan of treatment:	f/u dental office  clindamycin for 2 more days

## 2018-10-30 NOTE — PROVIDER CONTACT NOTE (EICU) - ASSESSMENT
On admission, pts FS was 462, serum agap was >32, serum glucose was 477, and large acetone noted. Pt given 2L NS bolus, started on insulin gtt, and sent to SICU.
DKA, dental abscess

## 2018-10-30 NOTE — DISCHARGE NOTE ADULT - CARE PROVIDER_API CALL
pcp,   Phone: (   )    -  Fax: (   )    -    Alie Wallace (DO), EndocrinologyMetabDiabetes; Internal Medicine  [facility] [place] [address]  Phone: [phone]  Fax: [phone]

## 2018-10-30 NOTE — PROGRESS NOTE ADULT - SUBJECTIVE AND OBJECTIVE BOX
22 y/o male with PMHx of DM-1 admitted to MICU for DKA. Patient said he had a tooth ache as a result he was unable to eat consequently not taking his insulin.  Treated with IVF, insulin in MICU. AG closed and patient downgraded to medical floor.     Today, patient was seen and examined at bedside. Not in acute distress, tolerating PO well. Patient has no acute complaint.     Physical examination:  VS: Temp: 99.3; HR: 89; BP: 101/59; RR: 22; O2 sat: 99 on RA   General: thin male, sitting comfortably; not in acute distress  Eyes: PERRLA   Lungs: CTA b/l  Heart: s1 s2, RRR  GI: BS+, soft, non-tender, nondistended  Ext: no edema, no cyanosis  Msk: ROM intact, no calf tenderness, strength normal   Neuro: Alert, awake and oriented, respond to verbal command, normal sensation and strength   Psych: normal mood and affect     Labs:   Na: 134; K+: 3.3; Cl: 104; CO2: 17; BUN: 10; Cr: 0.6; Glu: 142  WBC: 16.2 trending down (24.9 on admission); H/H: 13.9/39.3; Plt: 213  A

## 2018-10-30 NOTE — DISCHARGE NOTE ADULT - PATIENT PORTAL LINK FT
You can access the [facility], offered by [facility], by registering with the following website: [email or web address]

## 2018-10-30 NOTE — PROVIDER CONTACT NOTE (EICU) - BACKGROUND
type one diabetic C/O tooth pain x few days concered for dental abscess now with DKA, glu 492, AG > 32, Co2< 6, got 5 U bolus of insulin, 2 L bolus of Ns now on insulin drip. awaiting repeat BNP
DM
23M type 1 DM with DKA.  Due to non compliance

## 2018-10-30 NOTE — DISCHARGE NOTE ADULT - PLAN OF CARE
f/u dental office  clindamycin for 2 more days improved AG closed and HCO3 improved  Diabetic educator consulted and diabetic teaching given  f/u with pcp and endocrine as an outpatient

## 2018-10-31 LAB
ALBUMIN SERPL ELPH-MCNC: 4.1 G/DL — SIGNIFICANT CHANGE UP (ref 3.3–5.2)
ALP SERPL-CCNC: 78 U/L — SIGNIFICANT CHANGE UP (ref 40–120)
ALT FLD-CCNC: 10 U/L — SIGNIFICANT CHANGE UP
ANION GAP SERPL CALC-SCNC: 13 MMOL/L — SIGNIFICANT CHANGE UP (ref 5–17)
AST SERPL-CCNC: 16 U/L — SIGNIFICANT CHANGE UP
BILIRUB SERPL-MCNC: 0.5 MG/DL — SIGNIFICANT CHANGE UP (ref 0.4–2)
BUN SERPL-MCNC: 14 MG/DL — SIGNIFICANT CHANGE UP (ref 8–20)
CALCIUM SERPL-MCNC: 9.5 MG/DL — SIGNIFICANT CHANGE UP (ref 8.6–10.2)
CHLORIDE SERPL-SCNC: 94 MMOL/L — LOW (ref 98–107)
CO2 SERPL-SCNC: 26 MMOL/L — SIGNIFICANT CHANGE UP (ref 22–29)
CREAT SERPL-MCNC: 0.44 MG/DL — LOW (ref 0.5–1.3)
GLUCOSE BLDC GLUCOMTR-MCNC: 209 MG/DL — HIGH (ref 70–99)
GLUCOSE BLDC GLUCOMTR-MCNC: 258 MG/DL — HIGH (ref 70–99)
GLUCOSE BLDC GLUCOMTR-MCNC: 283 MG/DL — HIGH (ref 70–99)
GLUCOSE SERPL-MCNC: 322 MG/DL — HIGH (ref 70–115)
HBA1C BLD-MCNC: 14.1 % — HIGH (ref 4–5.6)
HCT VFR BLD CALC: 38.6 % — LOW (ref 42–52)
HGB BLD-MCNC: 13.6 G/DL — LOW (ref 14–18)
MCHC RBC-ENTMCNC: 32.5 PG — HIGH (ref 27–31)
MCHC RBC-ENTMCNC: 35.2 G/DL — SIGNIFICANT CHANGE UP (ref 32–36)
MCV RBC AUTO: 92.1 FL — SIGNIFICANT CHANGE UP (ref 80–94)
PLATELET # BLD AUTO: 167 K/UL — SIGNIFICANT CHANGE UP (ref 150–400)
POTASSIUM SERPL-MCNC: 4.5 MMOL/L — SIGNIFICANT CHANGE UP (ref 3.5–5.3)
POTASSIUM SERPL-SCNC: 4.5 MMOL/L — SIGNIFICANT CHANGE UP (ref 3.5–5.3)
PROT SERPL-MCNC: 6.4 G/DL — LOW (ref 6.6–8.7)
RBC # BLD: 4.19 M/UL — LOW (ref 4.6–6.2)
RBC # FLD: 12.9 % — SIGNIFICANT CHANGE UP (ref 11–15.6)
SODIUM SERPL-SCNC: 133 MMOL/L — LOW (ref 135–145)
WBC # BLD: 5.5 K/UL — SIGNIFICANT CHANGE UP (ref 4.8–10.8)
WBC # FLD AUTO: 5.5 K/UL — SIGNIFICANT CHANGE UP (ref 4.8–10.8)

## 2018-10-31 PROCEDURE — 93005 ELECTROCARDIOGRAM TRACING: CPT

## 2018-10-31 PROCEDURE — 99285 EMERGENCY DEPT VISIT HI MDM: CPT | Mod: 25

## 2018-10-31 PROCEDURE — 96375 TX/PRO/DX INJ NEW DRUG ADDON: CPT

## 2018-10-31 PROCEDURE — 99239 HOSP IP/OBS DSCHRG MGMT >30: CPT

## 2018-10-31 PROCEDURE — 96374 THER/PROPH/DIAG INJ IV PUSH: CPT

## 2018-10-31 PROCEDURE — 84100 ASSAY OF PHOSPHORUS: CPT

## 2018-10-31 PROCEDURE — 81001 URINALYSIS AUTO W/SCOPE: CPT

## 2018-10-31 PROCEDURE — 80053 COMPREHEN METABOLIC PANEL: CPT

## 2018-10-31 PROCEDURE — 82962 GLUCOSE BLOOD TEST: CPT

## 2018-10-31 PROCEDURE — 83036 HEMOGLOBIN GLYCOSYLATED A1C: CPT

## 2018-10-31 PROCEDURE — 96361 HYDRATE IV INFUSION ADD-ON: CPT

## 2018-10-31 PROCEDURE — 71045 X-RAY EXAM CHEST 1 VIEW: CPT

## 2018-10-31 PROCEDURE — 87538 HIV-2 PROBE&REVRSE TRNSCRIPJ: CPT

## 2018-10-31 PROCEDURE — 82803 BLOOD GASES ANY COMBINATION: CPT

## 2018-10-31 PROCEDURE — 83735 ASSAY OF MAGNESIUM: CPT

## 2018-10-31 PROCEDURE — 36415 COLL VENOUS BLD VENIPUNCTURE: CPT

## 2018-10-31 PROCEDURE — 85027 COMPLETE CBC AUTOMATED: CPT

## 2018-10-31 PROCEDURE — 82009 KETONE BODYS QUAL: CPT

## 2018-10-31 RX ADMIN — MAGNESIUM HYDROXIDE 30 MILLILITER(S): 400 TABLET, CHEWABLE ORAL at 08:44

## 2018-10-31 RX ADMIN — Medication 300 MILLIGRAM(S): at 13:38

## 2018-10-31 RX ADMIN — Medication 300 MILLIGRAM(S): at 05:57

## 2018-10-31 RX ADMIN — Medication 3: at 08:13

## 2018-10-31 RX ADMIN — Medication 3: at 11:34

## 2018-10-31 RX ADMIN — ENOXAPARIN SODIUM 40 MILLIGRAM(S): 100 INJECTION SUBCUTANEOUS at 11:35

## 2018-10-31 RX ADMIN — Medication 2: at 16:16

## 2018-10-31 NOTE — PROGRESS NOTE ADULT - ASSESSMENT
22 y/o male with PMHx of DM-1 admitted to MICU for DKA. Patient said he had a tooth ache as a result he was unable to eat consequently not taking his insulin.  Treated with IVF, insulin in MICU. AG closed and patient downgraded to medical floor.     DKA-resolved   Continue insulin Lantus HS and sliding scale   Monitor FS- BG was approx 500 at admission, now 200-300s. Anion GAP closed,     DM I diagnosed January 2018  Hg A1C= 14  Discussed w Daksha diabetes educator, she will see the patient today     Leukocytosis  WBC: 24.9--->16.2--> 5.5  On antibiotic for possible tooth infection   Monitor CBC     Tooth ache   Continue Clindamycin 300mg q8h, pain is well controlled. Michel need referral for dental eval after discharge.     Hypokalemia   K+: normalized after supplementation     Supportive  DVT prophylaxis: ambulate as tolerated  GI prophylaxis: not indicated  Diet: consistent carbohydrate     DISPO: Potential discharge today after DM education, will refer to endocrinology as an outpatient.

## 2018-10-31 NOTE — DIETITIAN INITIAL EVALUATION ADULT. - OTHER INFO
Aware nutrition consult for DM nutrition education. Pt admit with DKA, secondary to decreased appetite in setting of toothache, not taking insulin. BG >460mg/dL on admission. Pt was diagnosed with T1DM 01/2018 and was provided diet education at the time. A1c improved though still elevated 14.1%. Pt states checking BG 2-3x/day, trying to follow consistent CHO diet at home, increasing protein for gradual weight gain. Pt states checking weights often, weight fluctuates, though UBW prior to diagnosis was 125lbs. Wt from previous admission 1/2018 106lbs. Current wt 111lbs, 5 lb weight gain noted. Pt was provided nutrition education for cons CHO meal timing, increasing calories for gradual, desired weight gain. Pt was receptive to the information and showed good understanding. RD to remain available.

## 2018-10-31 NOTE — PROGRESS NOTE ADULT - SUBJECTIVE AND OBJECTIVE BOX
HPI: 24 y/o w DM type 1 (dx'd 2018). Admit with DKA He had a tooth ache yesterday and he was not able to eat so he did not take his insulin.   He had a few episodes of emesis (29 Oct 2018 23:57)    Interval/Overnight: No acute overnight events as per RN. Chart reviewed. Pt seen/examined by Attending and PA. Pt in NAD. Offers no complaint at this time.     REVIEW OF SYSTEMS:    Patient denied fever, chills, abdominal pain, nausea, vomiting, cough, shortness of breath, chest pain or palpitations    Vital Signs Last 24 Hrs  T(C): 36.8 (31 Oct 2018 08:00), Max: 36.8 (30 Oct 2018 16:00)  T(F): 98.2 (31 Oct 2018 08:00), Max: 98.2 (30 Oct 2018 16:00)  HR: 73 (31 Oct 2018 08:00) (73 - 99)  BP: 118/73 (31 Oct 2018 08:00) (98/64 - 124/76)  BP(mean): 76 (30 Oct 2018 15:00) (76 - 87)  RR: 18 (31 Oct 2018 08:00) (18 - 31)  SpO2: 98% (31 Oct 2018 08:00) (98% - 99%)I&O's Summary    30 Oct 2018 07:01  -  31 Oct 2018 07:00  --------------------------------------------------------  IN: 0 mL / OUT: 1600 mL / NET: -1600 mL    CAPILLARY BLOOD GLUCOSE    PHYSICAL EXAM:  GENERAL: very thin, NAD, well-groomed  HEENT: PERRL, +EOMI, anicteric  NECK: Supple, No JVD   CHEST/LUNG: CTA bilaterally; Normal effort  HEART: S1S2 Normal intensity, no murmurs, gallops or rubs noted  ABDOMEN: Soft, BS Normoactive, NT, ND, no HSM noted  EXTREMITIES:  2+ radial and DP pulses noted, no clubbing, cyanosis, or edema noted, FROM x 4  SKIN: No rashes or lesions noted  NEURO: A&Ox3, no focal deficits noted, CN II-XII intact  PSYCH: normal mood and affect; insight/judgement appropriate  LABS:                        13.6   5.5   )-----------( 167      ( 31 Oct 2018 10:52 )             38.6     10-    133<L>  |  94<L>  |  14.0  ----------------------------<  322<H>  4.5   |  26.0  |  0.44<L>    Ca    9.5      31 Oct 2018 10:52  Phos  2.3     10-30  Mg     1.8     10-    TPro  6.4<L>  /  Alb  4.1  /  TBili  0.5  /  DBili  x   /  AST  16  /  ALT  10  /  AlkPhos  78  10-31      Urinalysis Basic - ( 29 Oct 2018 19:52 )    Color: Yellow / Appearance: Clear / S.025 / pH: x  Gluc: x / Ketone: Large  / Bili: Negative / Urobili: Negative mg/dL   Blood: x / Protein: 30 mg/dL / Nitrite: Negative   Leuk Esterase: Negative / RBC: 0-2 /HPF / WBC Negative   Sq Epi: x / Non Sq Epi: Occasional / Bacteria: x      MEDICATIONS:  MEDICATIONS  (STANDING):  clindamycin   Capsule 300 milliGRAM(s) Oral every 8 hours  dextrose 5%. 1000 milliLiter(s) (50 mL/Hr) IV Continuous <Continuous>  dextrose 50% Injectable 12.5 Gram(s) IV Push once  dextrose 50% Injectable 25 Gram(s) IV Push once  dextrose 50% Injectable 25 Gram(s) IV Push once  enoxaparin Injectable 40 milliGRAM(s) SubCutaneous daily  insulin glargine Injectable (LANTUS) 10 Unit(s) SubCutaneous at bedtime  insulin lispro (HumaLOG) corrective regimen sliding scale   SubCutaneous three times a day before meals  insulin lispro (HumaLOG) corrective regimen sliding scale   SubCutaneous at bedtime    MEDICATIONS  (PRN):  dextrose 40% Gel 15 Gram(s) Oral once PRN Blood Glucose LESS THAN 70 milliGRAM(s)/deciliter  glucagon  Injectable 1 milliGRAM(s) IntraMuscular once PRN Glucose LESS THAN 70 milligrams/deciliter

## 2018-10-31 NOTE — ADVANCED PRACTICE NURSE CONSULT - ASSESSMENT
pt is a+o x3co 0 pain resting comfortably in bed, friends and family @ bedside providing comfort is ok to speak infront of friends and family. pt states he was here in January, he sees dr lopez for endocrine. pt states he takes basoglar and humalog via pen. in the last 2 weeks he missed between 5-10 injections a week. pt was educated about the result of his a1c>14 and was advised about the risk of developing long term complications pt verbalized understanding. pt states he has a glucometer and he was testing bg 2-4xdaiy nd was getting high numbers. discussed w pt sick day and the importance to check bg more frequently. he has dip sticks for ketones but he did not check. also advised him not to skip long acting insulin. pt is scheduled to see dr lopez on nov 8 2018

## 2018-11-01 ENCOUNTER — APPOINTMENT (OUTPATIENT)
Dept: ENDOCRINOLOGY | Facility: CLINIC | Age: 23
End: 2018-11-01

## 2018-11-01 VITALS
RESPIRATION RATE: 20 BRPM | SYSTOLIC BLOOD PRESSURE: 123 MMHG | HEART RATE: 81 BPM | DIASTOLIC BLOOD PRESSURE: 71 MMHG | TEMPERATURE: 99 F

## 2018-11-02 PROBLEM — E10.9 TYPE 1 DIABETES MELLITUS WITHOUT COMPLICATIONS: Chronic | Status: ACTIVE | Noted: 2018-10-29

## 2018-11-03 LAB — HIV 2 PROVIRAL DNA SERPL QL NAA+PROBE: SIGNIFICANT CHANGE UP

## 2018-11-05 ENCOUNTER — APPOINTMENT (OUTPATIENT)
Dept: ENDOCRINOLOGY | Facility: CLINIC | Age: 23
End: 2018-11-05

## 2018-11-28 ENCOUNTER — INPATIENT (INPATIENT)
Facility: HOSPITAL | Age: 23
LOS: 1 days | Discharge: HOME | DRG: 637 | End: 2018-11-30
Attending: INTERNAL MEDICINE | Admitting: INTERNAL MEDICINE
Payer: MEDICAID

## 2018-11-28 VITALS
DIASTOLIC BLOOD PRESSURE: 97 MMHG | HEART RATE: 97 BPM | OXYGEN SATURATION: 100 % | WEIGHT: 102.96 LBS | SYSTOLIC BLOOD PRESSURE: 170 MMHG | HEIGHT: 70 IN | RESPIRATION RATE: 24 BRPM

## 2018-11-28 DIAGNOSIS — E10.11 TYPE 1 DIABETES MELLITUS WITH KETOACIDOSIS WITH COMA: ICD-10-CM

## 2018-11-28 DIAGNOSIS — E13.10 OTHER SPECIFIED DIABETES MELLITUS WITH KETOACIDOSIS WITHOUT COMA: ICD-10-CM

## 2018-11-28 LAB
ACETONE SERPL-MCNC: ABNORMAL
ALBUMIN SERPL ELPH-MCNC: 4.7 G/DL — SIGNIFICANT CHANGE UP (ref 3.3–5.2)
ALP SERPL-CCNC: 176 U/L — HIGH (ref 40–120)
ALT FLD-CCNC: 16 U/L — SIGNIFICANT CHANGE UP
ANION GAP SERPL CALC-SCNC: 14 MMOL/L — SIGNIFICANT CHANGE UP (ref 5–17)
ANION GAP SERPL CALC-SCNC: 16 MMOL/L — SIGNIFICANT CHANGE UP (ref 5–17)
ANION GAP SERPL CALC-SCNC: 25 MMOL/L — HIGH (ref 5–17)
ANION GAP SERPL CALC-SCNC: 30 MMOL/L — HIGH (ref 5–17)
ANION GAP SERPL CALC-SCNC: >35 MMOL/L — HIGH (ref 5–17)
APAP SERPL-MCNC: <7.5 UG/ML — LOW (ref 10–26)
APPEARANCE UR: CLEAR — SIGNIFICANT CHANGE UP
AST SERPL-CCNC: 31 U/L — SIGNIFICANT CHANGE UP
BILIRUB SERPL-MCNC: 0.2 MG/DL — LOW (ref 0.4–2)
BILIRUB UR-MCNC: NEGATIVE — SIGNIFICANT CHANGE UP
BUN SERPL-MCNC: 11 MG/DL — SIGNIFICANT CHANGE UP (ref 8–20)
BUN SERPL-MCNC: 14 MG/DL — SIGNIFICANT CHANGE UP (ref 8–20)
BUN SERPL-MCNC: 16 MG/DL — SIGNIFICANT CHANGE UP (ref 8–20)
BUN SERPL-MCNC: 8 MG/DL — SIGNIFICANT CHANGE UP (ref 8–20)
BUN SERPL-MCNC: 8 MG/DL — SIGNIFICANT CHANGE UP (ref 8–20)
CALCIUM SERPL-MCNC: 7.9 MG/DL — LOW (ref 8.6–10.2)
CALCIUM SERPL-MCNC: 8.3 MG/DL — LOW (ref 8.6–10.2)
CALCIUM SERPL-MCNC: 8.4 MG/DL — LOW (ref 8.6–10.2)
CALCIUM SERPL-MCNC: 8.5 MG/DL — LOW (ref 8.6–10.2)
CALCIUM SERPL-MCNC: 9.3 MG/DL — SIGNIFICANT CHANGE UP (ref 8.6–10.2)
CHLORIDE SERPL-SCNC: 100 MMOL/L — SIGNIFICANT CHANGE UP (ref 98–107)
CHLORIDE SERPL-SCNC: 103 MMOL/L — SIGNIFICANT CHANGE UP (ref 98–107)
CHLORIDE SERPL-SCNC: 104 MMOL/L — SIGNIFICANT CHANGE UP (ref 98–107)
CHLORIDE SERPL-SCNC: 84 MMOL/L — LOW (ref 98–107)
CHLORIDE SERPL-SCNC: 98 MMOL/L — SIGNIFICANT CHANGE UP (ref 98–107)
CO2 SERPL-SCNC: 13 MMOL/L — LOW (ref 22–29)
CO2 SERPL-SCNC: 16 MMOL/L — LOW (ref 22–29)
CO2 SERPL-SCNC: 6 MMOL/L — CRITICAL LOW (ref 22–29)
CO2 SERPL-SCNC: 7 MMOL/L — CRITICAL LOW (ref 22–29)
CO2 SERPL-SCNC: <6 MMOL/L — CRITICAL LOW (ref 22–29)
COLOR SPEC: YELLOW — SIGNIFICANT CHANGE UP
CREAT SERPL-MCNC: 0.51 MG/DL — SIGNIFICANT CHANGE UP (ref 0.5–1.3)
CREAT SERPL-MCNC: 0.58 MG/DL — SIGNIFICANT CHANGE UP (ref 0.5–1.3)
CREAT SERPL-MCNC: 0.64 MG/DL — SIGNIFICANT CHANGE UP (ref 0.5–1.3)
CREAT SERPL-MCNC: 0.74 MG/DL — SIGNIFICANT CHANGE UP (ref 0.5–1.3)
CREAT SERPL-MCNC: 1.04 MG/DL — SIGNIFICANT CHANGE UP (ref 0.5–1.3)
DIFF PNL FLD: ABNORMAL
EPI CELLS # UR: SIGNIFICANT CHANGE UP
GAS PNL BLDV: SIGNIFICANT CHANGE UP
GLUCOSE BLDC GLUCOMTR-MCNC: 121 MG/DL — HIGH (ref 70–99)
GLUCOSE BLDC GLUCOMTR-MCNC: 133 MG/DL — HIGH (ref 70–99)
GLUCOSE BLDC GLUCOMTR-MCNC: 133 MG/DL — HIGH (ref 70–99)
GLUCOSE BLDC GLUCOMTR-MCNC: 141 MG/DL — HIGH (ref 70–99)
GLUCOSE BLDC GLUCOMTR-MCNC: 146 MG/DL — HIGH (ref 70–99)
GLUCOSE BLDC GLUCOMTR-MCNC: 161 MG/DL — HIGH (ref 70–99)
GLUCOSE BLDC GLUCOMTR-MCNC: 169 MG/DL — HIGH (ref 70–99)
GLUCOSE BLDC GLUCOMTR-MCNC: 183 MG/DL — HIGH (ref 70–99)
GLUCOSE BLDC GLUCOMTR-MCNC: 187 MG/DL — HIGH (ref 70–99)
GLUCOSE BLDC GLUCOMTR-MCNC: 212 MG/DL — HIGH (ref 70–99)
GLUCOSE BLDC GLUCOMTR-MCNC: 216 MG/DL — HIGH (ref 70–99)
GLUCOSE BLDC GLUCOMTR-MCNC: 219 MG/DL — HIGH (ref 70–99)
GLUCOSE BLDC GLUCOMTR-MCNC: 234 MG/DL — HIGH (ref 70–99)
GLUCOSE BLDC GLUCOMTR-MCNC: 249 MG/DL — HIGH (ref 70–99)
GLUCOSE BLDC GLUCOMTR-MCNC: 276 MG/DL — HIGH (ref 70–99)
GLUCOSE BLDC GLUCOMTR-MCNC: >530 MG/DL — CRITICAL HIGH (ref 70–99)
GLUCOSE SERPL-MCNC: 142 MG/DL — HIGH (ref 70–115)
GLUCOSE SERPL-MCNC: 146 MG/DL — HIGH (ref 70–115)
GLUCOSE SERPL-MCNC: 217 MG/DL — HIGH (ref 70–115)
GLUCOSE SERPL-MCNC: 297 MG/DL — HIGH (ref 70–115)
GLUCOSE SERPL-MCNC: 555 MG/DL — CRITICAL HIGH (ref 70–115)
GLUCOSE UR QL: 1000 MG/DL
GRAN CASTS # UR COMP ASSIST: ABNORMAL /LPF
HBA1C BLD-MCNC: 13.1 % — HIGH (ref 4–5.6)
HCO3 BLDV-SCNC: SIGNIFICANT CHANGE UP MMOL/L (ref 20–26)
HCT VFR BLD CALC: 51 % — SIGNIFICANT CHANGE UP (ref 42–52)
HGB BLD-MCNC: 17 G/DL — SIGNIFICANT CHANGE UP (ref 14–18)
KETONES UR-MCNC: ABNORMAL
LEUKOCYTE ESTERASE UR-ACNC: NEGATIVE — SIGNIFICANT CHANGE UP
LIDOCAIN IGE QN: 12 U/L — LOW (ref 22–51)
LYMPHOCYTES # BLD AUTO: 17 % — LOW (ref 20–55)
MAGNESIUM SERPL-MCNC: 1.3 MG/DL — LOW (ref 1.6–2.6)
MAGNESIUM SERPL-MCNC: 1.6 MG/DL — SIGNIFICANT CHANGE UP (ref 1.6–2.6)
MAGNESIUM SERPL-MCNC: 2.2 MG/DL — SIGNIFICANT CHANGE UP (ref 1.6–2.6)
MAGNESIUM SERPL-MCNC: 2.2 MG/DL — SIGNIFICANT CHANGE UP (ref 1.6–2.6)
MCHC RBC-ENTMCNC: 33.1 PG — HIGH (ref 27–31)
MCHC RBC-ENTMCNC: 33.3 G/DL — SIGNIFICANT CHANGE UP (ref 32–36)
MCV RBC AUTO: 99.2 FL — HIGH (ref 80–94)
METAMYELOCYTES # FLD: 2 % — HIGH (ref 0–0)
MONOCYTES NFR BLD AUTO: 7 % — SIGNIFICANT CHANGE UP (ref 3–10)
MYELOCYTES NFR BLD: 2 % — HIGH (ref 0–0)
NEUTROPHILS NFR BLD AUTO: 71 % — SIGNIFICANT CHANGE UP (ref 37–73)
NEUTS BAND # BLD: 1 % — SIGNIFICANT CHANGE UP (ref 0–8)
NITRITE UR-MCNC: NEGATIVE — SIGNIFICANT CHANGE UP
NRBC # BLD: 1 /100 — HIGH (ref 0–0)
PCO2 BLDV: 21 MMHG — LOW (ref 35–50)
PH BLDV: <6.76 — CRITICAL LOW (ref 7.32–7.43)
PH UR: 5 — SIGNIFICANT CHANGE UP (ref 5–8)
PHOSPHATE SERPL-MCNC: 1.8 MG/DL — LOW (ref 2.4–4.7)
PHOSPHATE SERPL-MCNC: 2.6 MG/DL — SIGNIFICANT CHANGE UP (ref 2.4–4.7)
PHOSPHATE SERPL-MCNC: 2.9 MG/DL — SIGNIFICANT CHANGE UP (ref 2.4–4.7)
PLAT MORPH BLD: NORMAL — SIGNIFICANT CHANGE UP
PLATELET # BLD AUTO: 276 K/UL — SIGNIFICANT CHANGE UP (ref 150–400)
PO2 BLDV: 81 MMHG — HIGH (ref 25–45)
POLYCHROMASIA BLD QL SMEAR: SLIGHT — SIGNIFICANT CHANGE UP
POTASSIUM SERPL-MCNC: 3.7 MMOL/L — SIGNIFICANT CHANGE UP (ref 3.5–5.3)
POTASSIUM SERPL-MCNC: 3.8 MMOL/L — SIGNIFICANT CHANGE UP (ref 3.5–5.3)
POTASSIUM SERPL-MCNC: 3.9 MMOL/L — SIGNIFICANT CHANGE UP (ref 3.5–5.3)
POTASSIUM SERPL-MCNC: 4.1 MMOL/L — SIGNIFICANT CHANGE UP (ref 3.5–5.3)
POTASSIUM SERPL-MCNC: 5.1 MMOL/L — SIGNIFICANT CHANGE UP (ref 3.5–5.3)
POTASSIUM SERPL-SCNC: 3.7 MMOL/L — SIGNIFICANT CHANGE UP (ref 3.5–5.3)
POTASSIUM SERPL-SCNC: 3.8 MMOL/L — SIGNIFICANT CHANGE UP (ref 3.5–5.3)
POTASSIUM SERPL-SCNC: 3.9 MMOL/L — SIGNIFICANT CHANGE UP (ref 3.5–5.3)
POTASSIUM SERPL-SCNC: 4.1 MMOL/L — SIGNIFICANT CHANGE UP (ref 3.5–5.3)
POTASSIUM SERPL-SCNC: 5.1 MMOL/L — SIGNIFICANT CHANGE UP (ref 3.5–5.3)
PROT SERPL-MCNC: 8.3 G/DL — SIGNIFICANT CHANGE UP (ref 6.6–8.7)
PROT UR-MCNC: 100 MG/DL
RBC # BLD: 5.14 M/UL — SIGNIFICANT CHANGE UP (ref 4.6–6.2)
RBC # FLD: 12.9 % — SIGNIFICANT CHANGE UP (ref 11–15.6)
RBC BLD AUTO: SIGNIFICANT CHANGE UP
SALICYLATES SERPL-MCNC: <0.6 MG/DL — LOW (ref 10–20)
SAO2 % BLDV: 91 % — SIGNIFICANT CHANGE UP
SODIUM SERPL-SCNC: 125 MMOL/L — LOW (ref 135–145)
SODIUM SERPL-SCNC: 130 MMOL/L — LOW (ref 135–145)
SODIUM SERPL-SCNC: 132 MMOL/L — LOW (ref 135–145)
SODIUM SERPL-SCNC: 134 MMOL/L — LOW (ref 135–145)
SODIUM SERPL-SCNC: 136 MMOL/L — SIGNIFICANT CHANGE UP (ref 135–145)
SP GR SPEC: 1.02 — SIGNIFICANT CHANGE UP (ref 1.01–1.02)
TSH SERPL-MCNC: 1.49 UIU/ML — SIGNIFICANT CHANGE UP (ref 0.27–4.2)
UROBILINOGEN FLD QL: NEGATIVE MG/DL — SIGNIFICANT CHANGE UP
WBC # BLD: 38.8 K/UL — HIGH (ref 4.8–10.8)
WBC # FLD AUTO: 38.8 K/UL — HIGH (ref 4.8–10.8)

## 2018-11-28 PROCEDURE — 93010 ELECTROCARDIOGRAM REPORT: CPT

## 2018-11-28 PROCEDURE — 99291 CRITICAL CARE FIRST HOUR: CPT

## 2018-11-28 PROCEDURE — 71045 X-RAY EXAM CHEST 1 VIEW: CPT | Mod: 26

## 2018-11-28 PROCEDURE — 99223 1ST HOSP IP/OBS HIGH 75: CPT

## 2018-11-28 RX ORDER — SODIUM BICARBONATE 1 MEQ/ML
50 SYRINGE (ML) INTRAVENOUS ONCE
Qty: 0 | Refills: 0 | Status: COMPLETED | OUTPATIENT
Start: 2018-11-28 | End: 2018-11-28

## 2018-11-28 RX ORDER — SODIUM CHLORIDE 9 MG/ML
3000 INJECTION INTRAMUSCULAR; INTRAVENOUS; SUBCUTANEOUS ONCE
Qty: 0 | Refills: 0 | Status: COMPLETED | OUTPATIENT
Start: 2018-11-28 | End: 2018-11-28

## 2018-11-28 RX ORDER — DEXTROSE MONOHYDRATE, SODIUM CHLORIDE, AND POTASSIUM CHLORIDE 50; .745; 4.5 G/1000ML; G/1000ML; G/1000ML
1000 INJECTION, SOLUTION INTRAVENOUS
Qty: 0 | Refills: 0 | Status: DISCONTINUED | OUTPATIENT
Start: 2018-11-28 | End: 2018-11-28

## 2018-11-28 RX ORDER — MAGNESIUM SULFATE 500 MG/ML
2 VIAL (ML) INJECTION EVERY 4 HOURS
Qty: 0 | Refills: 0 | Status: COMPLETED | OUTPATIENT
Start: 2018-11-28 | End: 2018-11-28

## 2018-11-28 RX ORDER — INFLUENZA VIRUS VACCINE 15; 15; 15; 15 UG/.5ML; UG/.5ML; UG/.5ML; UG/.5ML
0.5 SUSPENSION INTRAMUSCULAR ONCE
Qty: 0 | Refills: 0 | Status: COMPLETED | OUTPATIENT
Start: 2018-11-28 | End: 2018-11-28

## 2018-11-28 RX ORDER — HEPARIN SODIUM 5000 [USP'U]/ML
5000 INJECTION INTRAVENOUS; SUBCUTANEOUS EVERY 8 HOURS
Qty: 0 | Refills: 0 | Status: DISCONTINUED | OUTPATIENT
Start: 2018-11-28 | End: 2018-11-28

## 2018-11-28 RX ORDER — CEFTRIAXONE 500 MG/1
1 INJECTION, POWDER, FOR SOLUTION INTRAMUSCULAR; INTRAVENOUS ONCE
Qty: 0 | Refills: 0 | Status: COMPLETED | OUTPATIENT
Start: 2018-11-28 | End: 2018-11-28

## 2018-11-28 RX ORDER — ONDANSETRON 8 MG/1
8 TABLET, FILM COATED ORAL ONCE
Qty: 0 | Refills: 0 | Status: COMPLETED | OUTPATIENT
Start: 2018-11-28 | End: 2018-11-28

## 2018-11-28 RX ORDER — POTASSIUM CHLORIDE 20 MEQ
10 PACKET (EA) ORAL ONCE
Qty: 0 | Refills: 0 | Status: COMPLETED | OUTPATIENT
Start: 2018-11-28 | End: 2018-11-28

## 2018-11-28 RX ORDER — INSULIN GLARGINE 100 [IU]/ML
15 INJECTION, SOLUTION SUBCUTANEOUS ONCE
Qty: 0 | Refills: 0 | Status: COMPLETED | OUTPATIENT
Start: 2018-11-28 | End: 2018-11-28

## 2018-11-28 RX ORDER — SODIUM CHLORIDE 9 MG/ML
1000 INJECTION, SOLUTION INTRAVENOUS
Qty: 0 | Refills: 0 | Status: DISCONTINUED | OUTPATIENT
Start: 2018-11-28 | End: 2018-11-28

## 2018-11-28 RX ORDER — MAGNESIUM SULFATE 500 MG/ML
4 VIAL (ML) INJECTION ONCE
Qty: 0 | Refills: 0 | Status: DISCONTINUED | OUTPATIENT
Start: 2018-11-28 | End: 2018-11-28

## 2018-11-28 RX ORDER — ONDANSETRON 8 MG/1
4 TABLET, FILM COATED ORAL EVERY 4 HOURS
Qty: 0 | Refills: 0 | Status: DISCONTINUED | OUTPATIENT
Start: 2018-11-28 | End: 2018-11-30

## 2018-11-28 RX ORDER — ENOXAPARIN SODIUM 100 MG/ML
40 INJECTION SUBCUTANEOUS EVERY 24 HOURS
Qty: 0 | Refills: 0 | Status: DISCONTINUED | OUTPATIENT
Start: 2018-11-28 | End: 2018-11-30

## 2018-11-28 RX ORDER — INSULIN LISPRO 100/ML
5 VIAL (ML) SUBCUTANEOUS
Qty: 0 | Refills: 0 | Status: DISCONTINUED | OUTPATIENT
Start: 2018-11-28 | End: 2018-11-30

## 2018-11-28 RX ORDER — ENOXAPARIN SODIUM 100 MG/ML
40 INJECTION SUBCUTANEOUS DAILY
Qty: 0 | Refills: 0 | Status: DISCONTINUED | OUTPATIENT
Start: 2018-11-28 | End: 2018-11-28

## 2018-11-28 RX ORDER — DEXTROSE 50 % IN WATER 50 %
25 SYRINGE (ML) INTRAVENOUS ONCE
Qty: 0 | Refills: 0 | Status: DISCONTINUED | OUTPATIENT
Start: 2018-11-28 | End: 2018-11-30

## 2018-11-28 RX ORDER — POTASSIUM PHOSPHATE, MONOBASIC POTASSIUM PHOSPHATE, DIBASIC 236; 224 MG/ML; MG/ML
30 INJECTION, SOLUTION INTRAVENOUS ONCE
Qty: 0 | Refills: 0 | Status: COMPLETED | OUTPATIENT
Start: 2018-11-28 | End: 2018-11-28

## 2018-11-28 RX ORDER — INSULIN HUMAN 100 [IU]/ML
3 INJECTION, SOLUTION SUBCUTANEOUS
Qty: 50 | Refills: 0 | Status: DISCONTINUED | OUTPATIENT
Start: 2018-11-28 | End: 2018-11-28

## 2018-11-28 RX ORDER — INSULIN GLARGINE 100 [IU]/ML
15 INJECTION, SOLUTION SUBCUTANEOUS AT BEDTIME
Qty: 0 | Refills: 0 | Status: DISCONTINUED | OUTPATIENT
Start: 2018-11-29 | End: 2018-11-30

## 2018-11-28 RX ORDER — SODIUM CHLORIDE 9 MG/ML
1000 INJECTION, SOLUTION INTRAVENOUS
Qty: 0 | Refills: 0 | Status: DISCONTINUED | OUTPATIENT
Start: 2018-11-28 | End: 2018-11-29

## 2018-11-28 RX ADMIN — Medication 50 GRAM(S): at 21:14

## 2018-11-28 RX ADMIN — SODIUM CHLORIDE 6000 MILLILITER(S): 9 INJECTION INTRAMUSCULAR; INTRAVENOUS; SUBCUTANEOUS at 06:30

## 2018-11-28 RX ADMIN — ENOXAPARIN SODIUM 40 MILLIGRAM(S): 100 INJECTION SUBCUTANEOUS at 17:06

## 2018-11-28 RX ADMIN — Medication 100 MILLIEQUIVALENT(S): at 08:57

## 2018-11-28 RX ADMIN — ONDANSETRON 8 MILLIGRAM(S): 8 TABLET, FILM COATED ORAL at 06:33

## 2018-11-28 RX ADMIN — POTASSIUM PHOSPHATE, MONOBASIC POTASSIUM PHOSPHATE, DIBASIC 83.33 MILLIMOLE(S): 236; 224 INJECTION, SOLUTION INTRAVENOUS at 14:28

## 2018-11-28 RX ADMIN — INSULIN HUMAN 2 UNIT(S)/HR: 100 INJECTION, SOLUTION SUBCUTANEOUS at 18:02

## 2018-11-28 RX ADMIN — Medication 50 GRAM(S): at 14:49

## 2018-11-28 RX ADMIN — SODIUM CHLORIDE 200 MILLILITER(S): 9 INJECTION, SOLUTION INTRAVENOUS at 14:45

## 2018-11-28 RX ADMIN — INSULIN HUMAN 3 UNIT(S)/HR: 100 INJECTION, SOLUTION SUBCUTANEOUS at 15:58

## 2018-11-28 RX ADMIN — SODIUM CHLORIDE 150 MILLILITER(S): 9 INJECTION, SOLUTION INTRAVENOUS at 11:19

## 2018-11-28 RX ADMIN — SODIUM CHLORIDE 200 MILLILITER(S): 9 INJECTION, SOLUTION INTRAVENOUS at 21:14

## 2018-11-28 RX ADMIN — SODIUM CHLORIDE 200 MILLILITER(S): 9 INJECTION, SOLUTION INTRAVENOUS at 09:54

## 2018-11-28 RX ADMIN — INSULIN GLARGINE 15 UNIT(S): 100 INJECTION, SOLUTION SUBCUTANEOUS at 14:53

## 2018-11-28 RX ADMIN — CEFTRIAXONE 100 GRAM(S): 500 INJECTION, POWDER, FOR SOLUTION INTRAMUSCULAR; INTRAVENOUS at 07:00

## 2018-11-28 RX ADMIN — INSULIN HUMAN 4 UNIT(S)/HR: 100 INJECTION, SOLUTION SUBCUTANEOUS at 06:50

## 2018-11-28 RX ADMIN — INSULIN HUMAN 3 UNIT(S)/HR: 100 INJECTION, SOLUTION SUBCUTANEOUS at 21:15

## 2018-11-28 RX ADMIN — Medication 50 MILLIEQUIVALENT(S): at 07:33

## 2018-11-28 RX ADMIN — DEXTROSE MONOHYDRATE, SODIUM CHLORIDE, AND POTASSIUM CHLORIDE 150 MILLILITER(S): 50; .745; 4.5 INJECTION, SOLUTION INTRAVENOUS at 14:57

## 2018-11-28 RX ADMIN — SODIUM CHLORIDE 150 MILLILITER(S): 9 INJECTION, SOLUTION INTRAVENOUS at 11:52

## 2018-11-28 RX ADMIN — Medication 5 UNIT(S): at 18:02

## 2018-11-28 NOTE — CONSULT NOTE ADULT - ASSESSMENT
DM type 1, admitted in severe DKA, improving. Chronic level of control poor due to inadequate adherence with insulin use.  Pt. best treated with a basal/bolus regimen, but outpatient endocrine follow up badly needed. Will discuss further with pt.

## 2018-11-28 NOTE — PROVIDER CONTACT NOTE (EICU) - ASSESSMENT
- review of 1700 labs and continued q1 hour fingersticks noted.   - patient is awake/alert, mentation seems to be age appropriate, with mature conversation, recognition of humor, and coherent speech  - he is hungry with abdominal discomfort, nausea or emesis; tolerating PO liquids and solids well. He is voiding on his own  - labs reviewed and much improved with no electrolyte abnormalities, anion gap "closed" as per automated calculation, despite HCO3 still remaining low.
review of labs; noted hyponatremia, relative hypokalemia, hypomagnesemia, and hypophosphatemia; remains with metabolic anion gap acidosis.
Upon my initial evaluation, he is awake/alert, Kussmaul breathing with rate ~18/min, no active emesis, mental status appears cloudy.     review of labs is significant for continued hyperglycemia and a metabolic anion gap acidosis.

## 2018-11-28 NOTE — ED PROVIDER NOTE - OBJECTIVE STATEMENT
pt presents ams h/o dka states complaint with insulin father found him altered . denies drug use no si no hi. + recent admisison for dka. denies fever. denies HA or neck pain. no chest pain or sob. + [id number] achy non radiating abd pain. + nausea no urinary f/u/d. no back pain. no motor or sensory deficits. denies illicit drug use. no recent travel. no rash. no other acute issues symptoms or concerns

## 2018-11-28 NOTE — ED ADULT NURSE NOTE - OBJECTIVE STATEMENT
Pt's father states "he wasn't feeling well yesterday like he had a cold and then this morning he was confused", pt awake and sleepy on stretcher, following commands, pt tachypneic and diaphoretic, code team at bedside, BS RRHI on glucometer

## 2018-11-28 NOTE — H&P ADULT - PROBLEM SELECTOR PLAN 1
-started on insulin infusion at 4 units/hr in ED. initial blood glucose >530 now 276. titrate insulin infusion per protocol and/or per eICU management.  -given 3 L NS in ED. patient now started on lactated ringers with 20 meq K additive. switch to dextrose containing fluid when blood glucose falls below 250 to prevent cerebral edema from hypoglycemia  -q5 hour bmp  -q 1 hour accuchecks  -goal AG of 12. when AG reaches goal start long acting insulin then turn off insulin infusion 2 hours after long acting is administered.  -keep NPO  -Cr increased to 1 from 0.48 on October 31. therefore started heparin 5000 units q8 hours. switch to lovenox when Cr improves. consult patient's nephrologist

## 2018-11-28 NOTE — PROVIDER CONTACT NOTE (EICU) - BACKGROUND
admitted with DKA
admitted with DKA
23M initially presented with AMS brought in by father. Per HPI question on his compliance with insulin regiment, his presentation of generalized abdominal discomfort with nausea/vominting is consistent with previous admissions of DKA.

## 2018-11-28 NOTE — PROVIDER CONTACT NOTE (EICU) - RECOMMENDATIONS
- changed IV fluids to dextrose 5% + 0.9% sodium chloride + 40mEq KCl  - provided IV magnesium and potassium phosphate  - will recheck in 4 hours  - providing long acting insulin (Lantus) 15 units x 1 now, then again tomorrow evening. home dose 10units, current A1C is 13
- continue with insulin infusion for 3 more hours at 2 units per hour then d/c. this should assist with the continued underlying metabolic derangements (low HCO3) and provide coverage for the PO intake (D5 was d/c'd for now)  - 5 units of premeal, subcutaneous, humalog, this should assist with continued coverage  - repeat labs at 2100- BMP; Mg and Phos are normal at this time and still have some replacements pending  - the aim at this time is to continue to reduce the metabolic demands of his relative insulin deficiency that is driving the underlying acidosis; he is tolerating PO and the combination of premeal and IV should cover the elevated glucose as well as metabolically required insulin. he has received long acting already. Barring his HCO3 continues to improve (more so does not decline) he should be medically appropriate to be liberated from the MICU this evening. We will continue with q1 FS for now to avoid hypoglycemia (though with PO intake this is less likely).   - MICU CC ACP aware of the above and plan.
- FS < 250, added dextrose 5% @ 150ml/hr and diabetic based clears PO as tolerated  - continued LR @ 200ml/hr with 20mEq of KCl additives  - increased insulin infusion to 5units/hr for anion gap > 30  - rechecking labs at 1300 and q4 hours after; assessing A1C at 1300 as well--> A1C 14.1 on 10/31/18; while this could be elevated in the setting of hyperglycemia acutely, unlikely in this case.   - Diabetic education  - social work eval to discuss limitations to insulin compliance (cost, social stressors, etc).

## 2018-11-28 NOTE — ED PROVIDER NOTE - PROGRESS NOTE DETAILS
clincally appears in dka fluids insulin gtt icu admission and recs am atrtending aware plabn of care called lab k 5.1 moderate hemolysis will order potassium replacement likely nml to low and need to astart insuling gtt with severe dka

## 2018-11-28 NOTE — H&P ADULT - ASSESSMENT
22 y/o with pmhx of DM I and multiple admission for DKA now admitted with severe DKA due to medication noncompliance. Admit to MICU on insulin infusion. 22 y/o with pmhx of DM I and multiple admission for DKA now admitted with severe DKA due to medication noncompliance. Admit to MICU on insulin infusion.                 38 minutes of critical care time spent providing medical care for patient's acute illness/conditions that impairs at least one vital organ system and/or poses a high risk of imminent or life threatening deterioration in the patient's condition. It includes time spent evaluating and treating the patient's acute illness as well as time spent reviewing labs, radiology, discussing goals of care with patient and/or patient's family, and discussing the case with a multidisciplinary team in an effort to prevent further life threatening deterioration or end organ damage. This time is independent of any procedures performed.

## 2018-11-28 NOTE — ED ADULT TRIAGE NOTE - CHIEF COMPLAINT QUOTE
pt a+ox3, BIBA c/o hyperglycemia. father reports finding patient confused and not acting right. father states pt was recently in ED for same complaint. FS in triage critical high, code team and MD @ bedside

## 2018-11-28 NOTE — ADVANCED PRACTICE NURSE CONSULT - ASSESSMENT
pt is a+ox3 c/o 0 pain. [pt states that although we had a plan he was nt checking his bg as often or keeping up w the insulin regimen, he was taking insulin 1-2xweekly of long acting. also he was nt consistent w bg monitoring. he wants a pump. he did not see dr lopez since his last admission. pt was advised that with repeating dka due to lack of insulin he is getting away from the pump and encouraged him to improve glycmic control inorder to be able to get on a pump. pt seemed to need help w keeping his insulin regimen and fs, pt was advised to set alarms on his smart phone. also advised him to involve his father who he leaves w and girls frind to improve glycemic contorl. pt was invited to [facility] diabetes club on december 2, 2018, pt agreed to attend.

## 2018-11-28 NOTE — H&P ADULT - HISTORY OF PRESENT ILLNESS
22 y/o male with pmxh of DM I (on humalog and lantus) with multiple admission for DKA in the recent past due to noncompliance presents to ED today with nausea/vomiting, abdominal pain, and difficulty breathing. Patient found to be in DKA with initial AG of > 35 and a pH of 6.76. In the ED he was given 1 amp of bicarb, 3 L IVF, and started on insulin infusion. Patient endorses bilateral flank pain, denies hematuria or dysuria.    Admits to noncompliance with medication. Denies fever, cough, chills, diarrhea, recent illness, recent etoh abuse.

## 2018-11-28 NOTE — ED ADULT NURSE REASSESSMENT NOTE - NS ED NURSE REASSESS COMMENT FT1
Verbal order at bedside by Dr. Hazel to administer 3000mL normal saline bolus. To re-enter fluid order.
Patient rec'd at shift change alert and responding to questions appropriately on cristiane hugger for hypothermia. Will repeat fs and rectal temp. Otherwise in NAD, will ctm.

## 2018-11-28 NOTE — CONSULT NOTE ADULT - SUBJECTIVE AND OBJECTIVE BOX
HPI:  22 y/o male with pmxh of DM I (on humalog and lantus) with multiple admission for DKA in the recent past due to noncompliance presents to ED today with nausea/vomiting, abdominal pain, and difficulty breathing. Patient found to be in DKA with initial AG of > 35 and a pH of 6.76. In the ED he was given 1 amp of bicarb, 3 L IVF, and started on insulin infusion. Patient endorses bilateral flank pain, denies hematuria or dysuria.    Admits to noncompliance with medication. Denies fever, cough, chills, diarrhea, recent illness, recent etoh abuse. (2018 09:03)  Pt. presented 2018 with new diagnosis of type 1 diabetes and DKA. Another admission for DKA occurred last month. His outpatient adherence with follow up is unclear and his insulin use is apparently intermittent.  He states that his regimen is basaglar 15 units daily, and humalog 5 units AC.  Ongoing weight loss over the past year.      PAST MEDICAL & SURGICAL HISTORY:  Diabetes, type I  Recovering alcoholic  No significant past surgical history    REVIEW OF SYSTEMS:  weight loss, fatigue, weakness, dry mouth, dyspnea, abdominal pain, nausea    MEDICATIONS  (STANDING):  dextrose 5% + sodium chloride 0.9% with potassium chloride 40 mEq/L 1000 milliLiter(s) (150 mL/Hr) IV Continuous <Continuous>  dextrose 50% Injectable 25 Gram(s) IV Push once  dextrose 50% Injectable 25 Gram(s) IV Push once  enoxaparin Injectable 40 milliGRAM(s) SubCutaneous every 24 hours  insulin Infusion 3 Unit(s)/Hr (3 mL/Hr) IV Continuous <Continuous>  lactated ringers 1000 milliLiter(s) (200 mL/Hr) IV Continuous <Continuous>  magnesium sulfate  IVPB 2 Gram(s) IV Intermittent every 4 hours    MEDICATIONS  (PRN):  ondansetron Injectable 4 milliGRAM(s) IV Push every 4 hours PRN Nausea      Allergies    penicillin (Hives)    Intolerances          PHYSICAL EXAM:    Vital Signs Last 24 Hrs  T(C): 37.5 (2018 16:00), Max: 37.5 (2018 15:22)  T(F): 99.5 (2018 16:00), Max: 99.5 (2018 15:22)  HR: 96 (2018 15:00) (91 - 120)  BP: 111/61 (2018 15:00) (111/61 - 170/97)  BP(mean): 79 (2018 15:00) (79 - 110)  RR: 29 (2018 15:00) (22 - 46)  SpO2: 100% (2018 15:00) (96% - 100%)    General appearance: Slim, undernourished    Eyes: Pupils equal. EOM full. No exophthalmos.    Neck: Trachea midline. No thyroid enlargement.    Lungs: Normal respiratory excursion. Lungs clear.    CV: Regular cardiac rhythm. No murmur.     Abdomen: Soft, non tender, no organomegaly or mass.    Musculoskeletal: No cyanosis, clubbing, or edema.     Skin: Warm and dry. No rash. No acanthosis.    Neuro: Cranial nerves intact. Normal motor function.     Psych: Normal affect, questionable judgement.            LABS:                        17.0   38.8  )-----------( 276      ( 2018 06:36 )             51.0         130<L>  |  98  |  11.0  ----------------------------<  217<H>  3.7   |  7.0<LL>  |  0.64    Ca    8.5<L>      2018 13:39  Phos  1.8       Mg     1.3         TPro  8.3  /  Alb  4.7  /  TBili  0.2<L>  /  DBili  x   /  AST  31  /  ALT  16  /  AlkPhos  176<H>      Urinalysis Basic - ( 2018 07:42 )    Color: Yellow / Appearance: Clear / S.025 / pH: x  Gluc: x / Ketone: Large  / Bili: Negative / Urobili: Negative mg/dL   Blood: x / Protein: 100 mg/dL / Nitrite: Negative   Leuk Esterase: Negative / RBC: x / WBC x   Sq Epi: x / Non Sq Epi: Occasional / Bacteria: x      LIVER FUNCTIONS - ( 2018 06:36 )  Alb: 4.7 g/dL / Pro: 8.3 g/dL / ALK PHOS: 176 U/L / ALT: 16 U/L / AST: 31 U/L / GGT: x           Hemoglobin A1C, Whole Blood: 13.1 % (11-28 @ 13:22)        POCT Blood Glucose.: 133 mg/dL (18 @ 15:51)  POCT Blood Glucose.: 183 mg/dL (18 @ 14:55)  POCT Blood Glucose.: 187 mg/dL (18 @ 14:09)  POCT Blood Glucose.: 216 mg/dL (18 @ 12:53)  POCT Blood Glucose.: 146 mg/dL (18 @ 11:54)  POCT Blood Glucose.: 161 mg/dL (18 @ 10:54)  POCT Blood Glucose.: 219 mg/dL (18 @ 09:57)  POCT Blood Glucose.: 276 mg/dL (18 @ 08:59)  POCT Blood Glucose.: >530 mg/dL (18 @ 08:15)  POCT Blood Glucose.: >530 mg/dL (18 @ 06:25)

## 2018-11-28 NOTE — H&P ADULT - ATTENDING COMMENTS
22 yo male with hx of insulin dependent DM, presents with DKA, NEIL.  Patient admits to noncompliance with insulin regimen.    On exam he is somewhat pale and appears malnourished.  He is alert and oriented, cooperative and polite, NAD.  No obvious signs of infection on exam.    I discussed with EICU and bedside SICU nurse, patient's orders will be managed primarily by the EICU.

## 2018-11-28 NOTE — H&P ADULT - NSHPPHYSICALEXAM_GEN_ALL_CORE
General: laying in bed appears uncomfortable  Head: Normocephalic, atraumatic.   EENT: dry mucous membranes. Sclera white. PERRL, EOM intact. Secretions normal. no cervical lymphadenopathy  PULM: Breath sounds clear to auscultation symmetrically throughout all lung fields. No wheezing, rhonchi, or rales. No use of accessory muscles.  CVS: Regular rate and rhythm. No murmurs or rubs. Pulses 2+ on upper and lower extremities bilaterally.   GI: nondistended with bowel sounds normoactive in all four quadrants. No tenderness to light or deep palpation. No CVAT  Neuro: Strength 5/5 in upper and lower extremities. A&O x 3.  Musculoskeletal: FROM in all four extremities. FROM of cervical spine.  Skin: No lesions, rashes, ulcers. Extremities equally warm bilaterally. General: laying in bed appears uncomfortable  Head: Normocephalic, atraumatic.   EENT: dry mucous membranes. Sclera white. PERRL, EOM intact. Secretions normal. no cervical lymphadenopathy  PULM: kussmaul breathing. Breath sounds clear to auscultation symmetrically throughout all lung fields. No wheezing, rhonchi, or rales. No use of accessory muscles.  CVS: Regular rate and rhythm. No murmurs or rubs. Pulses 2+ on upper and lower extremities bilaterally.   GI: nondistended with bowel sounds normoactive in all four quadrants. No tenderness to light or deep palpation. No CVAT  Neuro: Strength 5/5 in upper and lower extremities. A&O x 3.  Musculoskeletal: FROM in all four extremities. FROM of cervical spine.  Skin: No lesions, rashes, ulcers. Extremities equally warm bilaterally.

## 2018-11-28 NOTE — ED ADULT NURSE NOTE - NSIMPLEMENTINTERV_GEN_ALL_ED
Implemented All Fall Risk Interventions:  [place] to call system. Call bell, personal items and telephone within reach. Instruct patient to call for assistance. Room bathroom lighting operational. Non-slip footwear when patient is off stretcher. Physically safe environment: no spills, clutter or unnecessary equipment. Stretcher in lowest position, wheels locked, appropriate side rails in place. Provide visual cue, wrist band, yellow gown, etc. Monitor gait and stability. Monitor for mental status changes and reorient to person, place, and time. Review medications for side effects contributing to fall risk. Reinforce activity limits and safety measures with patient and family.

## 2018-11-29 ENCOUNTER — TRANSCRIPTION ENCOUNTER (OUTPATIENT)
Age: 23
End: 2018-11-29

## 2018-11-29 DIAGNOSIS — E13.10 OTHER SPECIFIED DIABETES MELLITUS WITH KETOACIDOSIS WITHOUT COMA: ICD-10-CM

## 2018-11-29 DIAGNOSIS — D72.829 ELEVATED WHITE BLOOD CELL COUNT, UNSPECIFIED: ICD-10-CM

## 2018-11-29 DIAGNOSIS — E10.10 TYPE 1 DIABETES MELLITUS WITH KETOACIDOSIS WITHOUT COMA: ICD-10-CM

## 2018-11-29 DIAGNOSIS — E86.0 DEHYDRATION: ICD-10-CM

## 2018-11-29 LAB
ANION GAP SERPL CALC-SCNC: 12 MMOL/L — SIGNIFICANT CHANGE UP (ref 5–17)
BUN SERPL-MCNC: 6 MG/DL — LOW (ref 8–20)
CALCIUM SERPL-MCNC: 8.7 MG/DL — SIGNIFICANT CHANGE UP (ref 8.6–10.2)
CHLORIDE SERPL-SCNC: 102 MMOL/L — SIGNIFICANT CHANGE UP (ref 98–107)
CO2 SERPL-SCNC: 21 MMOL/L — LOW (ref 22–29)
CREAT SERPL-MCNC: 0.39 MG/DL — LOW (ref 0.5–1.3)
CULTURE RESULTS: SIGNIFICANT CHANGE UP
GLUCOSE BLDC GLUCOMTR-MCNC: 177 MG/DL — HIGH (ref 70–99)
GLUCOSE BLDC GLUCOMTR-MCNC: 211 MG/DL — HIGH (ref 70–99)
GLUCOSE BLDC GLUCOMTR-MCNC: 250 MG/DL — HIGH (ref 70–99)
GLUCOSE BLDC GLUCOMTR-MCNC: 274 MG/DL — HIGH (ref 70–99)
GLUCOSE BLDC GLUCOMTR-MCNC: 289 MG/DL — HIGH (ref 70–99)
GLUCOSE SERPL-MCNC: 150 MG/DL — HIGH (ref 70–115)
HBA1C BLD-MCNC: 13.2 % — HIGH (ref 4–5.6)
HCT VFR BLD CALC: 36 % — LOW (ref 42–52)
HGB BLD-MCNC: 12.8 G/DL — LOW (ref 14–18)
MAGNESIUM SERPL-MCNC: 2 MG/DL — SIGNIFICANT CHANGE UP (ref 1.6–2.6)
MCHC RBC-ENTMCNC: 32.4 PG — HIGH (ref 27–31)
MCHC RBC-ENTMCNC: 35.6 G/DL — SIGNIFICANT CHANGE UP (ref 32–36)
MCV RBC AUTO: 91.1 FL — SIGNIFICANT CHANGE UP (ref 80–94)
PHOSPHATE SERPL-MCNC: 1.5 MG/DL — LOW (ref 2.4–4.7)
PLATELET # BLD AUTO: 183 K/UL — SIGNIFICANT CHANGE UP (ref 150–400)
POTASSIUM SERPL-MCNC: 3.6 MMOL/L — SIGNIFICANT CHANGE UP (ref 3.5–5.3)
POTASSIUM SERPL-SCNC: 3.6 MMOL/L — SIGNIFICANT CHANGE UP (ref 3.5–5.3)
RBC # BLD: 3.95 M/UL — LOW (ref 4.6–6.2)
RBC # FLD: 13.2 % — SIGNIFICANT CHANGE UP (ref 11–15.6)
S PYO AG SPEC QL IA: NEGATIVE — SIGNIFICANT CHANGE UP
SODIUM SERPL-SCNC: 135 MMOL/L — SIGNIFICANT CHANGE UP (ref 135–145)
SPECIMEN SOURCE: SIGNIFICANT CHANGE UP
WBC # BLD: 14.8 K/UL — HIGH (ref 4.8–10.8)
WBC # FLD AUTO: 14.8 K/UL — HIGH (ref 4.8–10.8)

## 2018-11-29 PROCEDURE — 99232 SBSQ HOSP IP/OBS MODERATE 35: CPT

## 2018-11-29 PROCEDURE — 99233 SBSQ HOSP IP/OBS HIGH 50: CPT

## 2018-11-29 RX ORDER — INSULIN LISPRO 100/ML
VIAL (ML) SUBCUTANEOUS
Qty: 0 | Refills: 0 | Status: DISCONTINUED | OUTPATIENT
Start: 2018-11-29 | End: 2018-11-30

## 2018-11-29 RX ORDER — INSULIN LISPRO 100/ML
3 VIAL (ML) SUBCUTANEOUS ONCE
Qty: 0 | Refills: 0 | Status: COMPLETED | OUTPATIENT
Start: 2018-11-29 | End: 2018-11-29

## 2018-11-29 RX ORDER — POTASSIUM PHOSPHATE, MONOBASIC POTASSIUM PHOSPHATE, DIBASIC 236; 224 MG/ML; MG/ML
30 INJECTION, SOLUTION INTRAVENOUS ONCE
Qty: 0 | Refills: 0 | Status: COMPLETED | OUTPATIENT
Start: 2018-11-29 | End: 2018-11-29

## 2018-11-29 RX ADMIN — INSULIN GLARGINE 15 UNIT(S): 100 INJECTION, SOLUTION SUBCUTANEOUS at 21:36

## 2018-11-29 RX ADMIN — Medication 3 UNIT(S): at 01:54

## 2018-11-29 RX ADMIN — Medication 5 UNIT(S): at 17:03

## 2018-11-29 RX ADMIN — Medication 5 UNIT(S): at 08:19

## 2018-11-29 RX ADMIN — Medication 3: at 13:06

## 2018-11-29 RX ADMIN — Medication 5 UNIT(S): at 13:06

## 2018-11-29 RX ADMIN — POTASSIUM PHOSPHATE, MONOBASIC POTASSIUM PHOSPHATE, DIBASIC 83.33 MILLIMOLE(S): 236; 224 INJECTION, SOLUTION INTRAVENOUS at 08:19

## 2018-11-29 RX ADMIN — ENOXAPARIN SODIUM 40 MILLIGRAM(S): 100 INJECTION SUBCUTANEOUS at 17:04

## 2018-11-29 RX ADMIN — Medication 2: at 17:04

## 2018-11-29 NOTE — PROGRESS NOTE ADULT - SUBJECTIVE AND OBJECTIVE BOX
Pt is a 23 YOM with h/o DM-I with multiple recent admissions to ICU for DKA due to non-compliance with his home regimen of lantus/humolog. Pt states he has "good insurance" but just has been taking poor care of himself.  He was admitted to ICU overnight for volume resuscitation and insulin infusion until anion gap closes.  Endocrine is following.  Anion gap closed and pt restarted on long acting insulin with premeal and SSI coverage.  Pt reports c/o recent sore throat and cold like symptoms which his brother also had.  No fevers.  Will downgrade today to med service.  Will check throat culture to r/o strep.  Patient is a 23y old  Male who presents with a chief complaint of severe DKA (2018 14:08)      BRIEF HOSPITAL COURSE: ***    Events last 24 hours: ***    PAST MEDICAL & SURGICAL HISTORY:  Diabetes, type I  Recovering alcoholic  No significant past surgical history      Review of Systems:  CONSTITUTIONAL: No fever, chills, or fatigue  EYES: No eye pain, visual disturbances, or discharge  ENMT:  c/o having throat pain few days ago, not present now  NECK: No pain or stiffness  RESPIRATORY: No cough, wheezing, chills or hemoptysis; No shortness of breath  CARDIOVASCULAR: No chest pain, palpitations, dizziness, or leg swelling  GASTROINTESTINAL: No abdominal or epigastric pain. No nausea, vomiting, or hematemesis; No diarrhea or constipation. No melena or hematochezia.  GENITOURINARY: No dysuria, frequency, hematuria, or incontinence  NEUROLOGICAL: No headaches, memory loss, loss of strength, numbness, or tremors  SKIN: No itching, burning, rashes, or lesions   MUSCULOSKELETAL: No joint pain or swelling; No muscle, back, or extremity pain  PSYCHIATRIC: No depression, anxiety, mood swings, or difficulty sleeping      Medications:        ondansetron Injectable 4 milliGRAM(s) IV Push every 4 hours PRN      enoxaparin Injectable 40 milliGRAM(s) SubCutaneous every 24 hours        dextrose 50% Injectable 25 Gram(s) IV Push once  dextrose 50% Injectable 25 Gram(s) IV Push once  insulin glargine Injectable (LANTUS) 15 Unit(s) SubCutaneous at bedtime  insulin lispro (HumaLOG) corrective regimen sliding scale   SubCutaneous three times a day before meals  insulin lispro Injectable (HumaLOG) 5 Unit(s) SubCutaneous three times a day before meals      influenza   Vaccine 0.5 milliLiter(s) IntraMuscular once              ICU Vital Signs Last 24 Hrs  T(C): 36.9 (2018 08:00), Max: 37.5 (2018 15:22)  T(F): 98.5 (2018 08:00), Max: 99.5 (2018 15:22)  HR: 96 (2018 11:00) (75 - 105)  BP: 118/60 (2018 11:00) (96/50 - 126/62)  BP(mean): 80 (2018 11:00) (69 - 92)  ABP: --  ABP(mean): --  RR: 30 (2018 11:00) (17 - 40)  SpO2: 97% (2018 11:00) (94% - 100%)                LABS:                        12.8   14.8  )-----------( 183      ( 2018 06:04 )             36.0         135  |  102  |  6.0<L>  ----------------------------<  150<H>  3.6   |  21.0<L>  |  0.39<L>    Ca    8.7      2018 06:04  Phos  1.5       Mg     2.0         TPro  8.3  /  Alb  4.7  /  TBili  0.2<L>  /  DBili  x   /  AST  31  /  ALT  16  /  AlkPhos  176<H>            CAPILLARY BLOOD GLUCOSE  169 (2018 23:00)      POCT Blood Glucose.: 274 mg/dL (2018 12:12)      Urinalysis Basic - ( 2018 07:42 )    Color: Yellow / Appearance: Clear / S.025 / pH: x  Gluc: x / Ketone: Large  / Bili: Negative / Urobili: Negative mg/dL   Blood: x / Protein: 100 mg/dL / Nitrite: Negative   Leuk Esterase: Negative / RBC: x / WBC x   Sq Epi: x / Non Sq Epi: Occasional / Bacteria: x      CULTURES:  Culture Results:   <10,000 CFU/ml Gram Positive Cocci in Clusters  <10,000 CFU/ml Gram Positive Rods ( @ 07:42)      Physical Examination:    General: No acute distress.  Alert, oriented, interactive, nonfocal    HEENT: Pupils equal, reactive to light.  Symmetric. throat is erythematous no lesions    PULM: Clear to auscultation bilaterally, no significant sputum production    CVS: Regular rate and rhythm, no murmurs, rubs, or gallops    ABD: Soft, nondistended, nontender, normoactive bowel sounds, no masses    EXT: No edema, nontender    SKIN: Warm and well perfused, no rashes noted.    RADIOLOGY: ***    CRITICAL CARE TIME SPENT: *** Pt is a 23 YOM with h/o DM-I with multiple recent admissions to ICU for DKA due to non-compliance with his home regimen of lantus/humolog. Pt states he has "good insurance" but just has been taking poor care of himself.  He was admitted to ICU overnight for volume resuscitation and insulin infusion until anion gap closes.  Endocrine is following.  Anion gap closed and pt restarted on long acting insulin with premeal and SSI coverage.  Pt reports c/o recent sore throat and cold like symptoms which his brother also had.  No fevers.  Will downgrade today to med service.  Will check throat culture to r/o strep.  Patient is a 23y old  Male who presents with a chief complaint of severe DKA (2018 14:08)      BRIEF HOSPITAL COURSE: as above    Events last 24 hours: above    PAST MEDICAL & SURGICAL HISTORY:  Diabetes, type I  Recovering alcoholic  No significant past surgical history      Review of Systems:  CONSTITUTIONAL: No fever, chills, or fatigue  EYES: No eye pain, visual disturbances, or discharge  ENMT:  c/o having throat pain few days ago, not present now  NECK: No pain or stiffness  RESPIRATORY: No cough, wheezing, chills or hemoptysis; No shortness of breath  CARDIOVASCULAR: No chest pain, palpitations, dizziness, or leg swelling  GASTROINTESTINAL: No abdominal or epigastric pain. No nausea, vomiting, or hematemesis; No diarrhea or constipation. No melena or hematochezia.  GENITOURINARY: No dysuria, frequency, hematuria, or incontinence  NEUROLOGICAL: No headaches, memory loss, loss of strength, numbness, or tremors  SKIN: No itching, burning, rashes, or lesions   MUSCULOSKELETAL: No joint pain or swelling; No muscle, back, or extremity pain  PSYCHIATRIC: No depression, anxiety, mood swings, or difficulty sleeping      Medications:        ondansetron Injectable 4 milliGRAM(s) IV Push every 4 hours PRN      enoxaparin Injectable 40 milliGRAM(s) SubCutaneous every 24 hours        dextrose 50% Injectable 25 Gram(s) IV Push once  dextrose 50% Injectable 25 Gram(s) IV Push once  insulin glargine Injectable (LANTUS) 15 Unit(s) SubCutaneous at bedtime  insulin lispro (HumaLOG) corrective regimen sliding scale   SubCutaneous three times a day before meals  insulin lispro Injectable (HumaLOG) 5 Unit(s) SubCutaneous three times a day before meals      influenza   Vaccine 0.5 milliLiter(s) IntraMuscular once              ICU Vital Signs Last 24 Hrs  T(C): 36.9 (2018 08:00), Max: 37.5 (2018 15:22)  T(F): 98.5 (2018 08:00), Max: 99.5 (2018 15:22)  HR: 96 (2018 11:00) (75 - 105)  BP: 118/60 (2018 11:00) (96/50 - 126/62)  BP(mean): 80 (2018 11:00) (69 - 92)  ABP: --  ABP(mean): --  RR: 30 (2018 11:00) (17 - 40)  SpO2: 97% (2018 11:00) (94% - 100%)                LABS:                        12.8   14.8  )-----------( 183      ( 2018 06:04 )             36.0         135  |  102  |  6.0<L>  ----------------------------<  150<H>  3.6   |  21.0<L>  |  0.39<L>    Ca    8.7      2018 06:04  Phos  1.5       Mg     2.0         TPro  8.3  /  Alb  4.7  /  TBili  0.2<L>  /  DBili  x   /  AST  31  /  ALT  16  /  AlkPhos  176<H>            CAPILLARY BLOOD GLUCOSE  169 (2018 23:00)      POCT Blood Glucose.: 274 mg/dL (2018 12:12)      Urinalysis Basic - ( 2018 07:42 )    Color: Yellow / Appearance: Clear / S.025 / pH: x  Gluc: x / Ketone: Large  / Bili: Negative / Urobili: Negative mg/dL   Blood: x / Protein: 100 mg/dL / Nitrite: Negative   Leuk Esterase: Negative / RBC: x / WBC x   Sq Epi: x / Non Sq Epi: Occasional / Bacteria: x      CULTURES:  Culture Results:   <10,000 CFU/ml Gram Positive Cocci in Clusters  <10,000 CFU/ml Gram Positive Rods (11-28 @ 07:42)      Physical Examination:    General: No acute distress.  Alert, oriented, interactive, nonfocal    HEENT: Pupils equal, reactive to light.  Symmetric. throat is erythematous no exudates    PULM: Clear to auscultation bilaterally, no significant sputum production    CVS: Regular rate and rhythm, no murmurs, rubs, or gallops    ABD: Soft, nondistended, nontender, normoactive bowel sounds, no masses    EXT: No edema, nontender    SKIN: Warm and well perfused, no rashes noted.    RADIOLOGY: ***    CRITICAL CARE TIME SPENT: ***

## 2018-11-29 NOTE — PROGRESS NOTE ADULT - SUBJECTIVE AND OBJECTIVE BOX
ADRIAN MOHAN  Male   23y    [id number]        Patient is a 23y old  Male who presents with a chief complaint of severe DKA (2018 16:35)          REVIEW OF SYSTEMS:  CONSTITUTIONAL: No fever, weight loss, or fatigue  RESPIRATORY: No cough, wheezing, hemoptysis; No shortness of breath  CARDIOVASCULAR: No chest pain, palpitations  GASTROINTESTINAL: No abdominal or epigastric pain. No nausea, vomiting  NEUROLOGICAL: No headaches,  loss of strength.  MISCELLANEOUS: No joint swelling or pain     PHYSICAL EXAM:    Vital Signs Last 24 Hrs  T(C): 36.9 (2018 08:00), Max: 37.5 (2018 15:22)  T(F): 98.5 (2018 08:00), Max: 99.5 (2018 15:22)  HR: 96 (2018 11:00) (75 - 105)  BP: 118/60 (2018 11:00) (96/50 - 126/62)  BP(mean): 80 (:) (69 - 92)  RR: 30 (:) (17 - 40)  SpO2: 97% (2018 11:00) (94% - 100%)    GENERAL: Elderly male looking   HEENT: PERRL, +EOMI  NECK: soft, Supple, No JVD,   CHEST/LUNG: Clear to auscultate bilaterally; No wheezing  HEART: S1S2+, Regular rate and rhythm; No murmurs, rubs, or gallops  ABDOMEN: Soft, Nontender, Nondistended; Bowel sounds present  EXTREMITIES:  2+ Peripheral Pulses, No clubbing, cyanosis, or edema  SKIN: No rashes or lesions  NEURO: AAOX3, no focal deficits, no motor r sensory loss  PSYCH: normal mood    I&O's Summary    :  -  2018 07:00  --------------------------------------------------------  IN: 5184.5 mL / OUT: 5750 mL / NET: -565.5 mL    2018 07:  -  2018 11:33  --------------------------------------------------------  IN: 633.2 mL / OUT: 1700 mL / NET: -1066.8 mL        MEDICATIONS  (STANDING):  dextrose 50% Injectable 25 Gram(s) IV Push once  dextrose 50% Injectable 25 Gram(s) IV Push once  enoxaparin Injectable 40 milliGRAM(s) SubCutaneous every 24 hours  influenza   Vaccine 0.5 milliLiter(s) IntraMuscular once  insulin glargine Injectable (LANTUS) 15 Unit(s) SubCutaneous at bedtime  insulin lispro (HumaLOG) corrective regimen sliding scale   SubCutaneous three times a day before meals  insulin lispro Injectable (HumaLOG) 5 Unit(s) SubCutaneous three times a day before meals    MEDICATIONS  (PRN):  ondansetron Injectable 4 milliGRAM(s) IV Push every 4 hours PRN Nausea            LABS:                        12.8   14.8  )-----------( 183      ( 2018 06:04 )             36.0         135  |  102  |  6.0<L>  ----------------------------<  150<H>  3.6   |  21.0<L>  |  0.39<L>    Ca    8.7      2018 06:04  Phos  1.5       Mg     2.0         TPro  8.3  /  Alb  4.7  /  TBili  0.2<L>  /  DBili  x   /  AST  31  /  ALT  16  /  AlkPhos  176<H>        Urinalysis Basic - ( 2018 07:42 )    Color: Yellow / Appearance: Clear / S.025 / pH: x  Gluc: x / Ketone: Large  / Bili: Negative / Urobili: Negative mg/dL   Blood: x / Protein: 100 mg/dL / Nitrite: Negative   Leuk Esterase: Negative / RBC: x / WBC x   Sq Epi: x / Non Sq Epi: Occasional / Bacteria: x ADRIAN MOHAN  Male   23y    [id number]        Patient is a 23y old  Male who presents with a chief complaint of severe DKA (2018 16:35)    24 y/o male with pmxh of DM I (on humalog and lantus) with multiple admission for DKA in the recent past due to noncompliance presents to ED today with nausea/vomiting, abdominal pain, and difficulty breathing. Patient found to be in DKA with initial AG of > 35 and a pH of 6.76. In the ED he was given 1 amp of bicarb, 3 L IVF, and started on insulin infusion. Patient endorses bilateral flank pain, denies hematuria or dysuria.      REVIEW OF SYSTEMS:  CONSTITUTIONAL: No fever, weight loss, or fatigue  RESPIRATORY: No cough, wheezing, hemoptysis; No shortness of breath  CARDIOVASCULAR: No chest pain, palpitations  GASTROINTESTINAL: No abdominal or epigastric pain. No nausea, vomiting  NEUROLOGICAL: No headaches,  loss of strength.  MISCELLANEOUS: No joint swelling or pain     PHYSICAL EXAM:    Vital Signs Last 24 Hrs  T(C): 36.9 (2018 08:00), Max: 37.5 (2018 15:22)  T(F): 98.5 (2018 08:00), Max: 99.5 (2018 15:22)  HR: 96 (2018 11:00) (75 - 105)  BP: 118/60 (2018 11:00) (96/50 - 126/62)  BP(mean): 80 (2018 11:00) (69 - 92)  RR: 30 (2018 11:00) (17 - 40)  SpO2: 97% (2018 11:00) (94% - 100%)    GENERAL: Elderly male looking   HEENT: PERRL, +EOMI  NECK: soft, Supple, No JVD,   CHEST/LUNG: Clear to auscultate bilaterally; No wheezing  HEART: S1S2+, Regular rate and rhythm; No murmurs, rubs, or gallops  ABDOMEN: Soft, Nontender, Nondistended; Bowel sounds present  EXTREMITIES:  2+ Peripheral Pulses, No clubbing, cyanosis, or edema  SKIN: No rashes or lesions  NEURO: AAOX3, no focal deficits, no motor r sensory loss  PSYCH: normal mood    I&O's Summary    2018 07: 07:00  --------------------------------------------------------  IN: 5184.5 mL / OUT: 5750 mL / NET: -565.5 mL    2018 07:  -  2018 11:33  --------------------------------------------------------  IN: 633.2 mL / OUT: 1700 mL / NET: -1066.8 mL        MEDICATIONS  (STANDING):  dextrose 50% Injectable 25 Gram(s) IV Push once  dextrose 50% Injectable 25 Gram(s) IV Push once  enoxaparin Injectable 40 milliGRAM(s) SubCutaneous every 24 hours  influenza   Vaccine 0.5 milliLiter(s) IntraMuscular once  insulin glargine Injectable (LANTUS) 15 Unit(s) SubCutaneous at bedtime  insulin lispro (HumaLOG) corrective regimen sliding scale   SubCutaneous three times a day before meals  insulin lispro Injectable (HumaLOG) 5 Unit(s) SubCutaneous three times a day before meals    MEDICATIONS  (PRN):  ondansetron Injectable 4 milliGRAM(s) IV Push every 4 hours PRN Nausea            LABS:                        12.8   14.8  )-----------( 183      ( 2018 06:04 )             36.0         135  |  102  |  6.0<L>  ----------------------------<  150<H>  3.6   |  21.0<L>  |  0.39<L>    Ca    8.7      2018 06:04  Phos  1.5       Mg     2.0         TPro  8.3  /  Alb  4.7  /  TBili  0.2<L>  /  DBili  x   /  AST  31  /  ALT  16  /  AlkPhos  176<H>        Urinalysis Basic - ( 2018 07:42 )    Color: Yellow / Appearance: Clear / S.025 / pH: x  Gluc: x / Ketone: Large  / Bili: Negative / Urobili: Negative mg/dL   Blood: x / Protein: 100 mg/dL / Nitrite: Negative   Leuk Esterase: Negative / RBC: x / WBC x   Sq Epi: x / Non Sq Epi: Occasional / Bacteria: x ADRIAN MOHAN  Male   23y    [id number]        23 years old male with PMH of Type1 DM presented to the ER on  for the evaluation of nausea, vomiting and abdominal pain.     24 y/o male with pmxh of DM I (on humalog and lantus) with multiple admission for DKA in the recent past due to noncompliance presents to ED today with nausea/vomiting, abdominal pain, and difficulty breathing. Patient found to be in DKA with initial AG of > 35 and a pH of 6.76. In the ED he was given 1 amp of bicarb, 3 L IVF, and started on insulin infusion. Patient endorses bilateral flank pain, denies hematuria or dysuria.      REVIEW OF SYSTEMS:  CONSTITUTIONAL: No fever, weight loss, or fatigue  RESPIRATORY: No cough, wheezing, hemoptysis; No shortness of breath  CARDIOVASCULAR: No chest pain, palpitations  GASTROINTESTINAL: No abdominal or epigastric pain. No nausea, vomiting  NEUROLOGICAL: No headaches,  loss of strength.  MISCELLANEOUS: No joint swelling or pain     PHYSICAL EXAM:    Vital Signs Last 24 Hrs  T(C): 36.9 (2018 08:00), Max: 37.5 (2018 15:22)  T(F): 98.5 (2018 08:00), Max: 99.5 (2018 15:22)  HR: 96 (2018 11:00) (75 - 105)  BP: 118/60 (2018 11:00) (96/50 - 126/62)  BP(mean): 80 (2018 11:00) (69 - 92)  RR: 30 (2018 11:00) (17 - 40)  SpO2: 97% (2018 11:00) (94% - 100%)    GENERAL: Elderly male looking   HEENT: PERRL, +EOMI  NECK: soft, Supple, No JVD,   CHEST/LUNG: Clear to auscultate bilaterally; No wheezing  HEART: S1S2+, Regular rate and rhythm; No murmurs, rubs, or gallops  ABDOMEN: Soft, Nontender, Nondistended; Bowel sounds present  EXTREMITIES:  2+ Peripheral Pulses, No clubbing, cyanosis, or edema  SKIN: No rashes or lesions  NEURO: AAOX3, no focal deficits, no motor r sensory loss  PSYCH: normal mood    I&O's Summary    2018 07: 07:00  --------------------------------------------------------  IN: 5184.5 mL / OUT: 5750 mL / NET: -565.5 mL    2018 07:  -  2018 11:33  --------------------------------------------------------  IN: 633.2 mL / OUT: 1700 mL / NET: -1066.8 mL        MEDICATIONS  (STANDING):  dextrose 50% Injectable 25 Gram(s) IV Push once  dextrose 50% Injectable 25 Gram(s) IV Push once  enoxaparin Injectable 40 milliGRAM(s) SubCutaneous every 24 hours  influenza   Vaccine 0.5 milliLiter(s) IntraMuscular once  insulin glargine Injectable (LANTUS) 15 Unit(s) SubCutaneous at bedtime  insulin lispro (HumaLOG) corrective regimen sliding scale   SubCutaneous three times a day before meals  insulin lispro Injectable (HumaLOG) 5 Unit(s) SubCutaneous three times a day before meals    MEDICATIONS  (PRN):  ondansetron Injectable 4 milliGRAM(s) IV Push every 4 hours PRN Nausea            LABS:                        12.8   14.8  )-----------( 183      ( 2018 06:04 )             36.0         135  |  102  |  6.0<L>  ----------------------------<  150<H>  3.6   |  21.0<L>  |  0.39<L>    Ca    8.7      2018 06:04  Phos  1.5       Mg     2.0         TPro  8.3  /  Alb  4.7  /  TBili  0.2<L>  /  DBili  x   /  AST  31  /  ALT  16  /  AlkPhos  176<H>        Urinalysis Basic - ( 2018 07:42 )    Color: Yellow / Appearance: Clear / S.025 / pH: x  Gluc: x / Ketone: Large  / Bili: Negative / Urobili: Negative mg/dL   Blood: x / Protein: 100 mg/dL / Nitrite: Negative   Leuk Esterase: Negative / RBC: x / WBC x   Sq Epi: x / Non Sq Epi: Occasional / Bacteria: x ADRIAN MOHAN  Male   23y    [id number]        23 years old male with PMH of Type1 DM presented to the ER on  for the evaluation of nausea, vomiting and abdominal pain. Upon arrival blood sugar 555, anion gap of 35 and PH 6.76.  he was admitted to ICU and started on insulin drip and IVF. His FS improved and anion gap closed. He was given 15 units of Lantus and insulin drip was discontinued this morning.   Patient examined at bed side, offers no complaints    REVIEW OF SYSTEMS:  CONSTITUTIONAL: c/o fatigue.  RESPIRATORY: No cough, wheezing, hemoptysis; No shortness of breath  CARDIOVASCULAR: No chest pain, palpitations  GASTROINTESTINAL: No abdominal or epigastric pain. No nausea, vomiting  NEUROLOGICAL: No headaches,  loss of strength.  MISCELLANEOUS: No joint swelling or pain     PHYSICAL EXAM:    Vital Signs Last 24 Hrs  T(C): 36.9 (2018 08:00), Max: 37.5 (2018 15:22)  T(F): 98.5 (2018 08:00), Max: 99.5 (2018 15:22)  HR: 96 (2018 11:00) (75 - 105)  BP: 118/60 (2018 11:00) (96/50 - 126/62)  BP(mean): 80 (2018 11:00) (69 - 92)  RR: 30 (2018 11:00) (17 - 40)  SpO2: 97% (2018 11:00) (94% - 100%)    GENERAL: NAD  HEENT: PERRL, +EOMI  NECK:  Supple, No JVD,   CHEST/LUNG: Clear to auscultate bilaterally; No wheezing  HEART: S1S2+, Regular rate and rhythm; No murmurs, rubs, or gallops  ABDOMEN: Soft, Nontender, Nondistended; Bowel sounds present  EXTREMITIES:  2+ Peripheral Pulses, No clubbing, cyanosis, or edema  SKIN: No rashes or lesions  NEURO: AAOX3, no focal deficits, no motor r sensory loss  PSYCH: normal mood    I&O's Summary    2018 07:01  -  2018 07:00  --------------------------------------------------------  IN: 5184.5 mL / OUT: 5750 mL / NET: -565.5 mL    2018 07:01  -  2018 11:33  --------------------------------------------------------  IN: 633.2 mL / OUT: 1700 mL / NET: -1066.8 mL    MEDICATIONS  (STANDING):  dextrose 50% Injectable 25 Gram(s) IV Push once  dextrose 50% Injectable 25 Gram(s) IV Push once  enoxaparin Injectable 40 milliGRAM(s) SubCutaneous every 24 hours  influenza   Vaccine 0.5 milliLiter(s) IntraMuscular once  insulin glargine Injectable (LANTUS) 15 Unit(s) SubCutaneous at bedtime  insulin lispro (HumaLOG) corrective regimen sliding scale   SubCutaneous three times a day before meals  insulin lispro Injectable (HumaLOG) 5 Unit(s) SubCutaneous three times a day before meals    MEDICATIONS  (PRN):  ondansetron Injectable 4 milliGRAM(s) IV Push every 4 hours PRN Nausea    LABS:                        12.8   14.8  )-----------( 183      ( 2018 06:04 )             36.0         135  |  102  |  6.0<L>  ----------------------------<  150<H>  3.6   |  21.0<L>  |  0.39<L>    Ca    8.7      2018 06:04  Phos  1.5       Mg     2.0         TPro  8.3  /  Alb  4.7  /  TBili  0.2<L>  /  DBili  x   /  AST  31  /  ALT  16  /  AlkPhos  176<H>        Urinalysis Basic - ( 2018 07:42 )    Color: Yellow / Appearance: Clear / S.025 / pH: x  Gluc: x / Ketone: Large  / Bili: Negative / Urobili: Negative mg/dL   Blood: x / Protein: 100 mg/dL / Nitrite: Negative   Leuk Esterase: Negative / RBC: x / WBC x   Sq Epi: x / Non Sq Epi: Occasional / Bacteria: x

## 2018-11-29 NOTE — DISCHARGE NOTE ADULT - PLAN OF CARE
be compliant with basal bolus method of insulin and follow up with endocrinology. DKA was a result of not taking insulin as prescribed. You were counseled on the risks of recurrent ketoacidosis from endocrinology. We discuss complications including kidney failure requiring life-long dialysis, loss of limb due to severe infection and poor wound healing, loss of eye sight and subsequent poor quality of life that would result from these complications. Patient to follow up with endocrinology. resolved; you were evaluated for strept throat which was negative. Urine cultures negative as well. No fevers. If you have fevers, N/V, chills please return to the ED.

## 2018-11-29 NOTE — DISCHARGE NOTE ADULT - CARE PROVIDER_API CALL
Shon Clifford), EndocrinologyMetabDiabetes; Internal Medicine  [address] [place] [address]  Phone: [phone]  Fax: [phone]

## 2018-11-29 NOTE — ADVANCED PRACTICE NURSE CONSULT - RECOMMEDATIONS
continue diabetes self management education  pls consider psych consult  also pls consider basal bolus vs novolog 70/30 2 daily

## 2018-11-29 NOTE — DISCHARGE NOTE ADULT - HOSPITAL COURSE
23 years old male with PMH of Type1 DM presented to the ER on 11/28 for the evaluation of nausea, vomiting and abdominal pain. Upon arrival blood sugar 555, anion gap of 35 and PH 6.76.  he was admitted to ICU and started on insulin drip and IVF. His FS improved and anion gap closed. He was given 15 units of Lantus and insulin drip was discontinued 11/29 morning. This morning patient tolerating diet; WBC trended to normal and he had no fevers since admission.    He lives with his father currently which he feels is a safe condition. 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 He is eager to go back to work. Patient counseled on dangers of repeated admissions (he was diagnosed in January and came in DKA that time and again around [facility]). Patient admits that he doesn't take good care of himself. He is able to demonstrate that he can use insulin properly prior to leaving. He is in agreement with regimen of basal bolus as outline by endocrinology. he will follow up with them as his previous endocrinologist was too far a commute for him.    ICU Vital Signs Last 24 Hrs  T(C): 37.2 (30 Nov 2018 10:34), Max: 37.2 (30 Nov 2018 10:34)  T(F): 98.9 (30 Nov 2018 10:34), Max: 98.9 (30 Nov 2018 10:34)  HR: 84 (30 Nov 2018 10:34) (62 - 86)  BP: 112/59 (30 Nov 2018 10:34) (111/63 - 118/57)  BP(mean): --  ABP: --  ABP(mean): --  RR: 20 (30 Nov 2018 10:34) (20 - 22)  SpO2: 98% (29 Nov 2018 20:27) (98% - 98%)    PHYSICAL EXAM:    General: Well developed; under nourished; in no acute distress; thin frame  Eyes: PERRLA, EOMI; conjunctiva and sclera clear  Head: Normocephalic; atraumatic  ENMT: No nasal discharge; airway clear  Respiratory: No wheezes, rales or rhonchi  Cardiovascular: Regular rate and rhythm. S1 and S2 Normal; No murmurs, gallops or rubs  Gastrointestinal: Soft non-tender non-distended; Normal bowel sounds  Genitourinary: No costovertebral angle tenderness  Extremities: Normal range of motion, No clubbing, cyanosis or edema  Neurological: Alert and oriented x4  Psychiatric: Cooperative and appropriate    discharge time 33 minutes

## 2018-11-29 NOTE — PROVIDER CONTACT NOTE (EICU) - SITUATION
Electrolyte review
DKA management
DKA management
still with anion gap metabolic acidosis (AG =25) and DKA. FS < 150, patient is tolerating PO, will reduce insulin infusion to 3units/hour; recheck in 1 hour.
New patient admitted with DKA, currently located in the SICU. Asked to assist in management of DKA- IV fluids, insulin management, and electrolyte replacement.

## 2018-11-29 NOTE — DISCHARGE NOTE ADULT - CARE PLAN
Principal Discharge DX:	Type 1 diabetes mellitus with ketoacidosis without coma  Goal:	be compliant with basal bolus method of insulin and follow up with endocrinology.  Assessment and plan of treatment:	DKA was a result of not taking insulin as prescribed. You were counseled on the risks of recurrent ketoacidosis from endocrinology. We discuss complications including kidney failure requiring life-long dialysis, loss of limb due to severe infection and poor wound healing, loss of eye sight and subsequent poor quality of life that would result from these complications. Patient to follow up with endocrinology.  Secondary Diagnosis:	Leukocytosis, unspecified type  Assessment and plan of treatment:	resolved; you were evaluated for strept throat which was negative. Urine cultures negative as well. No fevers. If you have fevers, N/V, chills please return to the ED.

## 2018-11-29 NOTE — PROVIDER CONTACT NOTE (EICU) - ACTION/TREATMENT ORDERED:
30mmol of potassium phosphate ordered for potassium of 3.6 and phosphate of 1.5
as above d/w bedside rn and ICU team.
as above, communicated to bedside RN and [facility] MICU prn

## 2018-11-29 NOTE — DISCHARGE NOTE ADULT - MEDICATION SUMMARY - MEDICATIONS TO STOP TAKING
I will STOP taking the medications listed below when I get home from the hospital:    clindamycin 300 mg oral capsule  -- 1 cap(s) by mouth every 8 hours

## 2018-11-29 NOTE — DISCHARGE NOTE ADULT - PATIENT PORTAL LINK FT
You can access the [facility] Patient Portal, offered by [facility], by registering with the following website: [email or web address]

## 2018-11-29 NOTE — DISCHARGE NOTE ADULT - MEDICATION SUMMARY - MEDICATIONS TO TAKE
I will START or STAY ON the medications listed below when I get home from the hospital:    Glucometer as per insurance  -- 1   4 times a day   -- Indication: For Diabetic ketoacidosis with coma associated with type 1 diabetes mellitus    Glucometer Test Strips as per insurance  -- 1 each  4 times a day   -- Indication: For Diabetic ketoacidosis with coma associated with type 1 diabetes mellitus    Lancets  -- 1 each  4 times a day   -- Indication: For Diabetic ketoacidosis with coma associated with type 1 diabetes mellitus    BD Nissa Neeles, 4mm 32G  -- 1 unit(s) subcutaneous 4 times a day   -- Indication: For Diabetic ketoacidosis with coma associated with type 1 diabetes mellitus    HumaLOG KwikPen 100 units/mL injectable solution  -- 5 unit(s) injectable 3 times a day (before meals)   -- Indication: For Diabetic ketoacidosis with coma associated with type 1 diabetes mellitus    Basaglar KwikPen 100 units/mL subcutaneous solution  -- 10 unit(s) subcutaneous once a day (at bedtime)   -- Do not drink alcoholic beverages when taking this medication.  It is very important that you take or use this exactly as directed.  Do not skip doses or discontinue unless directed by your doctor.  Keep in refrigerator.  Do not freeze.    -- Indication: For Diabetic ketoacidosis with coma associated with type 1 diabetes mellitus

## 2018-11-29 NOTE — PROGRESS NOTE ADULT - PROBLEM SELECTOR PLAN 1
Volume resuscitated  insulin infusion stopped/anion gap closed  started on Long acting insulin/humolog/ssi coverage  endocrine following  pt counseled on importance of medicine compliance/checking blood glucose at home Volume resuscitated  insulin infusion stopped/anion gap closed  started on Long acting insulin/humolog/ssi coverage  endocrine following  downgrade  pt counseled on importance of medicine compliance/checking blood glucose at home

## 2018-11-29 NOTE — PROGRESS NOTE ADULT - PROBLEM SELECTOR PROBLEM 1
Diabetic ketoacidosis without coma associated with type 1 diabetes mellitus
DKA (diabetic ketoacidoses)

## 2018-11-29 NOTE — ADVANCED PRACTICE NURSE CONSULT - ASSESSMENT
return to see pt in am, assisted pt to set alarm on his smart phone. also scheduled an apointment w dr taylor office for Monday novemeber 3 2018 @ 2 pm. pt to speak to father to bring his home glucometer.

## 2018-11-29 NOTE — PROGRESS NOTE ADULT - PROBLEM SELECTOR PLAN 1
resolved.  -274.  HbA1C 13.2.  endocrinology has been consulted.  continue Lantus 15 units at night.  ADA diet.  FS with SSI.  Diabetic education.

## 2018-11-29 NOTE — PROGRESS NOTE ADULT - SUBJECTIVE AND OBJECTIVE BOX
INTERVAL HPI/OVERNIGHT EVENTS:  follow up of DKA    MEDICATIONS  (STANDING):  dextrose 50% Injectable 25 Gram(s) IV Push once  dextrose 50% Injectable 25 Gram(s) IV Push once  enoxaparin Injectable 40 milliGRAM(s) SubCutaneous every 24 hours  influenza   Vaccine 0.5 milliLiter(s) IntraMuscular once  insulin glargine Injectable (LANTUS) 15 Unit(s) SubCutaneous at bedtime  insulin lispro (HumaLOG) corrective regimen sliding scale   SubCutaneous three times a day before meals  insulin lispro Injectable (HumaLOG) 5 Unit(s) SubCutaneous three times a day before meals    MEDICATIONS  (PRN):  ondansetron Injectable 4 milliGRAM(s) IV Push every 4 hours PRN Nausea      Allergies    penicillin (Hives)    Intolerances        Review of systems: feels much improved. No dyspnea or abdominal pain    social and family history:  pt. lives with father. Mother has type 1 diabetes and is on an insulin pump. Pt. has 2 young children who live with their mother.  WOrks 9-5 with lunch break. Pt. able to inject regularly at lunch at dinner, but may have difficulty with a morning injection due to commute.    Vital Signs Last 24 Hrs  T(C): 36.9 (2018 08:00), Max: 37.5 (2018 16:00)  T(F): 98.5 (2018 08:00), Max: 99.5 (2018 16:00)  HR: 96 (2018 11:00) (75 - 105)  BP: 118/60 (2018 11:00) (96/50 - 126/62)  BP(mean): 80 (2018 11:00) (69 - 92)  RR: 30 (2018 11:00) (17 - 40)  SpO2: 97% (2018 11:00) (94% - 99%)      LABS:                        12.8   14.8  )-----------( 183      ( 2018 06:04 )             36.0         135  |  102  |  6.0<L>  ----------------------------<  150<H>  3.6   |  21.0<L>  |  0.39<L>    Ca    8.7      2018 06:04  Phos  1.5       Mg     2.0         TPro  8.3  /  Alb  4.7  /  TBili  0.2<L>  /  DBili  x   /  AST  31  /  ALT  16  /  AlkPhos  176<H>      Urinalysis Basic - ( 2018 07:42 )    Color: Yellow / Appearance: Clear / S.025 / pH: x  Gluc: x / Ketone: Large  / Bili: Negative / Urobili: Negative mg/dL   Blood: x / Protein: 100 mg/dL / Nitrite: Negative   Leuk Esterase: Negative / RBC: x / WBC x   Sq Epi: x / Non Sq Epi: Occasional / Bacteria: x        Hemoglobin A1C, Whole Blood: 13.2 % ( @ 06:05)    POCT Blood Glucose.: 274 mg/dL (18 @ 12:12)  POCT Blood Glucose.: 250 mg/dL (18 @ 01:06)  POCT Blood Glucose.: 177 mg/dL (18 @ 00:11)  POCT Blood Glucose.: 169 mg/dL (18 @ 23:05)  POCT Blood Glucose.: 121 mg/dL (18 @ 22:07)  POCT Blood Glucose.: 141 mg/dL (18 @ 21:08)  POCT Blood Glucose.: 234 mg/dL (18 @ 20:02)  POCT Blood Glucose.: 249 mg/dL (18 @ 18:59)  POCT Blood Glucose.: 212 mg/dL (18 @ 17:50)  POCT Blood Glucose.: 133 mg/dL (18 @ 16:54)  POCT Blood Glucose.: 133 mg/dL (18 @ 15:51)  POCT Blood Glucose.: 183 mg/dL (18 @ 14:55)  POCT Blood Glucose.: 187 mg/dL (18 @ 14:09)  POCT Blood Glucose.: 216 mg/dL (18 @ 12:53)  POCT Blood Glucose.: 146 mg/dL (18 @ 11:54)  POCT Blood Glucose.: 161 mg/dL (18 @ 10:54)  POCT Blood Glucose.: 219 mg/dL (18 @ 09:57)  POCT Blood Glucose.: 276 mg/dL (18 @ 08:59)  POCT Blood Glucose.: >530 mg/dL (18 @ 08:15)  POCT Blood Glucose.: >530 mg/dL (18 @ 06:25)

## 2018-11-30 VITALS
HEART RATE: 81 BPM | RESPIRATION RATE: 81 BRPM | TEMPERATURE: 99 F | DIASTOLIC BLOOD PRESSURE: 66 MMHG | SYSTOLIC BLOOD PRESSURE: 122 MMHG | OXYGEN SATURATION: 96 %

## 2018-11-30 LAB
GLUCOSE BLDC GLUCOMTR-MCNC: 190 MG/DL — HIGH (ref 70–99)
GLUCOSE BLDC GLUCOMTR-MCNC: 201 MG/DL — HIGH (ref 70–99)
GLUCOSE BLDC GLUCOMTR-MCNC: 230 MG/DL — HIGH (ref 70–99)
HCT VFR BLD CALC: 40 % — LOW (ref 42–52)
HGB BLD-MCNC: 13.8 G/DL — LOW (ref 14–18)
MCHC RBC-ENTMCNC: 31.3 PG — HIGH (ref 27–31)
MCHC RBC-ENTMCNC: 34.5 G/DL — SIGNIFICANT CHANGE UP (ref 32–36)
MCV RBC AUTO: 90.7 FL — SIGNIFICANT CHANGE UP (ref 80–94)
PLATELET # BLD AUTO: 161 K/UL — SIGNIFICANT CHANGE UP (ref 150–400)
RBC # BLD: 4.41 M/UL — LOW (ref 4.6–6.2)
RBC # FLD: 12.9 % — SIGNIFICANT CHANGE UP (ref 11–15.6)
WBC # BLD: 9.9 K/UL — SIGNIFICANT CHANGE UP (ref 4.8–10.8)
WBC # FLD AUTO: 9.9 K/UL — SIGNIFICANT CHANGE UP (ref 4.8–10.8)

## 2018-11-30 PROCEDURE — 99232 SBSQ HOSP IP/OBS MODERATE 35: CPT

## 2018-11-30 RX ORDER — INSULIN LISPRO 100/ML
5 VIAL (ML) SUBCUTANEOUS
Qty: 3 | Refills: 0
Start: 2018-11-30

## 2018-11-30 RX ORDER — INSULIN LISPRO 100/ML
5 VIAL (ML) SUBCUTANEOUS
Qty: 1 | Refills: 1
Start: 2018-11-30

## 2018-11-30 RX ORDER — INSULIN GLARGINE 100 [IU]/ML
10 INJECTION, SOLUTION SUBCUTANEOUS
Qty: 1 | Refills: 1
Start: 2018-11-30

## 2018-11-30 RX ADMIN — Medication 5 UNIT(S): at 08:32

## 2018-11-30 RX ADMIN — Medication 5 UNIT(S): at 12:20

## 2018-11-30 RX ADMIN — Medication 2: at 08:32

## 2018-11-30 RX ADMIN — Medication 1: at 17:23

## 2018-11-30 RX ADMIN — Medication 2: at 12:21

## 2018-11-30 RX ADMIN — Medication 5 UNIT(S): at 17:23

## 2018-11-30 NOTE — DIETITIAN INITIAL EVALUATION ADULT. - OTHER INFO
BMI 16.7, Pt non compliant with DM management. Pt eating well. Weight loss noted. Muscle/fat depletion -temples. biceps, clavicles, orbital, thoracic region

## 2018-11-30 NOTE — DIETITIAN INITIAL EVALUATION ADULT. - PERTINENT LABORATORY DATA
11-29 Na135 mmol/L Glu 150 mg/dL<H> K+ 3.6 mmol/L Cr  0.39 mg/dL<L> BUN 6.0 mg/dL<L> Phos 1.5 mg/dL<L> Alb n/a   PAB n/a

## 2018-11-30 NOTE — DIETITIAN INITIAL EVALUATION ADULT. - NS AS NUTRI INTERV ED CONTENT
meal planning with plate model/Purpose of the nutrition education/Nutrition relationship to health/disease

## 2018-11-30 NOTE — ADVANCED PRACTICE NURSE CONSULT - ASSESSMENT
return to see pt in am. pt is a+ox3 c/o 0 pian resting comfortably in bed. family brought his glucometer and he was able to return demonstration of fs successfully.  pt agreed to come to [facility] diabetes club on december 2 2018. assisted pt to set 2 more alarms on his smart phone  for his insulin and fs schedule for a total of 4x. also advised him to incorporate his father in helping him remembering the fs and the insulin

## 2018-11-30 NOTE — DIETITIAN INITIAL EVALUATION ADULT. - NUTRITION INTERVENTION
Meals and Snack/Medical Food Supplements Medical Food Supplements/Meals and Snack/Nutrition Education

## 2018-11-30 NOTE — CHART NOTE - NSCHARTNOTEFT_GEN_A_CORE
Upon Nutritional Assessment by the Registered Dietitian your patient was determined to meet criteria / has evidence of the following diagnosis/diagnoses:          [ ]  Mild Protein Calorie Malnutrition        [ ]  Moderate Protein Calorie Malnutrition        [x ] Severe Protein Calorie Malnutrition  Chronic        [ ] Unspecified Protein Calorie Malnutrition        [ ] Underweight / BMI <19        [ ] Morbid Obesity / BMI > 40      Findings as based on:  •  Comprehensive nutrition assessment and consultation  •  Calorie counts (nutrient intake analysis)  •  Food acceptance and intake status from observations by staff  •  Follow up  •  Patient education  •  Intervention secondary to interdisciplinary rounds  •   concerns      Treatment:    The following diet has been recommended: Rec Glucerna shake TID, Education provided      PROVIDER Section:     By signing this assessment you are acknowledging and agree with the diagnosis/diagnoses assigned by the Registered Dietitian    Comments:

## 2018-11-30 NOTE — DIETITIAN INITIAL EVALUATION ADULT. - ETIOLOGY
related to inadequate protein intake and lack of nutrient dense foods in setting of non DM compliance

## 2018-11-30 NOTE — PROGRESS NOTE ADULT - SUBJECTIVE AND OBJECTIVE BOX
INTERVAL HPI/OVERNIGHT EVENTS: follow up diabetes    MEDICATIONS  (STANDING):  dextrose 50% Injectable 25 Gram(s) IV Push once  dextrose 50% Injectable 25 Gram(s) IV Push once  enoxaparin Injectable 40 milliGRAM(s) SubCutaneous every 24 hours  insulin glargine Injectable (LANTUS) 15 Unit(s) SubCutaneous at bedtime  insulin lispro (HumaLOG) corrective regimen sliding scale   SubCutaneous three times a day before meals  insulin lispro Injectable (HumaLOG) 5 Unit(s) SubCutaneous three times a day before meals    MEDICATIONS  (PRN):  ondansetron Injectable 4 milliGRAM(s) IV Push every 4 hours PRN Nausea      Allergies    penicillin (Hives)    Intolerances        Review of systems: feels well    Vital Signs Last 24 Hrs  T(C): 37.2 (30 Nov 2018 10:34), Max: 37.2 (30 Nov 2018 10:34)  T(F): 98.9 (30 Nov 2018 10:34), Max: 98.9 (30 Nov 2018 10:34)  HR: 84 (30 Nov 2018 10:34) (62 - 86)  BP: 112/59 (30 Nov 2018 10:34) (111/63 - 118/57)  BP(mean): --  RR: 20 (30 Nov 2018 10:34) (20 - 22)  SpO2: 98% (29 Nov 2018 20:27) (98% - 98%)      LABS:                        13.8   9.9   )-----------( 161      ( 30 Nov 2018 07:35 )             40.0     11-29    135  |  102  |  6.0<L>  ----------------------------<  150<H>  3.6   |  21.0<L>  |  0.39<L>    Ca    8.7      29 Nov 2018 06:04  Phos  1.5     11-29  Mg     2.0     11-29            POCT Blood Glucose.: 230 mg/dL (11-30-18 @ 12:04)  POCT Blood Glucose.: 201 mg/dL (11-30-18 @ 08:19)  POCT Blood Glucose.: 289 mg/dL (11-29-18 @ 21:23)  POCT Blood Glucose.: 211 mg/dL (11-29-18 @ 16:40)  POCT Blood Glucose.: 274 mg/dL (11-29-18 @ 12:12)  POCT Blood Glucose.: 250 mg/dL (11-29-18 @ 01:06)  POCT Blood Glucose.: 177 mg/dL (11-29-18 @ 00:11)  POCT Blood Glucose.: 169 mg/dL (11-28-18 @ 23:05)  POCT Blood Glucose.: 121 mg/dL (11-28-18 @ 22:07)  POCT Blood Glucose.: 141 mg/dL (11-28-18 @ 21:08)  POCT Blood Glucose.: 234 mg/dL (11-28-18 @ 20:02)  POCT Blood Glucose.: 249 mg/dL (11-28-18 @ 18:59)  POCT Blood Glucose.: 212 mg/dL (11-28-18 @ 17:50)  POCT Blood Glucose.: 133 mg/dL (11-28-18 @ 16:54)  POCT Blood Glucose.: 133 mg/dL (11-28-18 @ 15:51)  POCT Blood Glucose.: 183 mg/dL (11-28-18 @ 14:55)  POCT Blood Glucose.: 187 mg/dL (11-28-18 @ 14:09)  POCT Blood Glucose.: 216 mg/dL (11-28-18 @ 12:53)  POCT Blood Glucose.: 146 mg/dL (11-28-18 @ 11:54)  POCT Blood Glucose.: 161 mg/dL (11-28-18 @ 10:54)  POCT Blood Glucose.: 219 mg/dL (11-28-18 @ 09:57)  POCT Blood Glucose.: 276 mg/dL (11-28-18 @ 08:59)  POCT Blood Glucose.: >530 mg/dL (11-28-18 @ 08:15)  POCT Blood Glucose.: >530 mg/dL (11-28-18 @ 06:25)      RADIOLOGY & ADDITIONAL TESTS:

## 2018-12-01 LAB
CULTURE RESULTS: SIGNIFICANT CHANGE UP
SPECIMEN SOURCE: SIGNIFICANT CHANGE UP

## 2018-12-03 ENCOUNTER — OTHER (OUTPATIENT)
Age: 23
End: 2018-12-03

## 2018-12-03 ENCOUNTER — APPOINTMENT (OUTPATIENT)
Dept: ENDOCRINOLOGY | Facility: CLINIC | Age: 23
End: 2018-12-03
Payer: SELF-PAY

## 2018-12-03 VITALS — WEIGHT: 117 LBS | HEIGHT: 70 IN | BODY MASS INDEX: 16.75 KG/M2

## 2018-12-03 PROCEDURE — G0108 DIAB MANAGE TRN  PER INDIV: CPT

## 2019-01-02 ENCOUNTER — APPOINTMENT (OUTPATIENT)
Dept: ENDOCRINOLOGY | Facility: CLINIC | Age: 24
End: 2019-01-02

## 2019-01-09 PROCEDURE — 82962 GLUCOSE BLOOD TEST: CPT

## 2019-01-09 PROCEDURE — 87081 CULTURE SCREEN ONLY: CPT

## 2019-01-09 PROCEDURE — 71045 X-RAY EXAM CHEST 1 VIEW: CPT

## 2019-01-09 PROCEDURE — 80053 COMPREHEN METABOLIC PANEL: CPT

## 2019-01-09 PROCEDURE — 85027 COMPLETE CBC AUTOMATED: CPT

## 2019-01-09 PROCEDURE — 87040 BLOOD CULTURE FOR BACTERIA: CPT

## 2019-01-09 PROCEDURE — 81001 URINALYSIS AUTO W/SCOPE: CPT

## 2019-01-09 PROCEDURE — 83690 ASSAY OF LIPASE: CPT

## 2019-01-09 PROCEDURE — 99285 EMERGENCY DEPT VISIT HI MDM: CPT | Mod: 25

## 2019-01-09 PROCEDURE — 83735 ASSAY OF MAGNESIUM: CPT

## 2019-01-09 PROCEDURE — 93005 ELECTROCARDIOGRAM TRACING: CPT

## 2019-01-09 PROCEDURE — 83036 HEMOGLOBIN GLYCOSYLATED A1C: CPT

## 2019-01-09 PROCEDURE — 87880 STREP A ASSAY W/OPTIC: CPT

## 2019-01-09 PROCEDURE — 82009 KETONE BODYS QUAL: CPT

## 2019-01-09 PROCEDURE — 96374 THER/PROPH/DIAG INJ IV PUSH: CPT

## 2019-01-09 PROCEDURE — 87086 URINE CULTURE/COLONY COUNT: CPT

## 2019-01-09 PROCEDURE — 36415 COLL VENOUS BLD VENIPUNCTURE: CPT

## 2019-01-09 PROCEDURE — 84443 ASSAY THYROID STIM HORMONE: CPT

## 2019-01-09 PROCEDURE — 80307 DRUG TEST PRSMV CHEM ANLYZR: CPT

## 2019-01-09 PROCEDURE — 80048 BASIC METABOLIC PNL TOTAL CA: CPT

## 2019-01-09 PROCEDURE — 96375 TX/PRO/DX INJ NEW DRUG ADDON: CPT

## 2019-01-09 PROCEDURE — 84100 ASSAY OF PHOSPHORUS: CPT

## 2019-01-09 PROCEDURE — 82803 BLOOD GASES ANY COMBINATION: CPT

## 2019-01-15 NOTE — ED ADULT NURSE NOTE - ISOLATION TYPE:
Weakness and falls Weakness and falls Weakness and falls Weakness and falls Weakness and falls Weakness and falls Weakness and falls None

## 2019-01-23 ENCOUNTER — APPOINTMENT (OUTPATIENT)
Dept: ENDOCRINOLOGY | Facility: CLINIC | Age: 24
End: 2019-01-23

## 2019-02-06 ENCOUNTER — RECORD ABSTRACTING (OUTPATIENT)
Age: 24
End: 2019-02-06

## 2019-02-06 DIAGNOSIS — F12.90 CANNABIS USE, UNSPECIFIED, UNCOMPLICATED: ICD-10-CM

## 2019-02-06 DIAGNOSIS — Z78.9 OTHER SPECIFIED HEALTH STATUS: ICD-10-CM

## 2019-02-06 DIAGNOSIS — F17.200 NICOTINE DEPENDENCE, UNSPECIFIED, UNCOMPLICATED: ICD-10-CM

## 2019-02-21 ENCOUNTER — APPOINTMENT (OUTPATIENT)
Dept: ENDOCRINOLOGY | Facility: CLINIC | Age: 24
End: 2019-02-21

## 2019-02-28 ENCOUNTER — RX RENEWAL (OUTPATIENT)
Age: 24
End: 2019-02-28

## 2019-03-13 ENCOUNTER — APPOINTMENT (OUTPATIENT)
Dept: ENDOCRINOLOGY | Facility: CLINIC | Age: 24
End: 2019-03-13

## 2019-05-30 ENCOUNTER — INPATIENT (INPATIENT)
Facility: HOSPITAL | Age: 24
LOS: 1 days | Discharge: HOME | DRG: 639 | End: 2019-06-01
Attending: INTERNAL MEDICINE | Admitting: INTERNAL MEDICINE
Payer: MEDICAID

## 2019-05-30 VITALS
TEMPERATURE: 98 F | WEIGHT: 119.93 LBS | SYSTOLIC BLOOD PRESSURE: 121 MMHG | HEIGHT: 71 IN | HEART RATE: 105 BPM | OXYGEN SATURATION: 96 % | DIASTOLIC BLOOD PRESSURE: 86 MMHG | RESPIRATION RATE: 18 BRPM

## 2019-05-30 DIAGNOSIS — E86.0 DEHYDRATION: ICD-10-CM

## 2019-05-30 DIAGNOSIS — E10.10 TYPE 1 DIABETES MELLITUS WITH KETOACIDOSIS WITHOUT COMA: ICD-10-CM
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
## 2019-05-30 LAB
ACETONE SERPL-MCNC: ABNORMAL
AMPHET UR-MCNC: NEGATIVE — SIGNIFICANT CHANGE UP
ANION GAP SERPL CALC-SCNC: 16 MMOL/L — SIGNIFICANT CHANGE UP (ref 5–17)
ANION GAP SERPL CALC-SCNC: 22 MMOL/L — HIGH (ref 5–17)
ANION GAP SERPL CALC-SCNC: 29 MMOL/L — HIGH (ref 5–17)
APPEARANCE UR: CLEAR — SIGNIFICANT CHANGE UP
BACTERIA # UR AUTO: NEGATIVE — SIGNIFICANT CHANGE UP
BARBITURATES UR SCN-MCNC: NEGATIVE — SIGNIFICANT CHANGE UP
BASE EXCESS BLDV CALC-SCNC: -24.2 MMOL/L — LOW (ref -2–2)
BASOPHILS # BLD AUTO: 0.1 K/UL — SIGNIFICANT CHANGE UP (ref 0–0.2)
BASOPHILS NFR BLD AUTO: 0.6 % — SIGNIFICANT CHANGE UP (ref 0–2)
BENZODIAZ UR-MCNC: NEGATIVE — SIGNIFICANT CHANGE UP
BILIRUB UR-MCNC: NEGATIVE — SIGNIFICANT CHANGE UP
BUN SERPL-MCNC: 12 MG/DL — SIGNIFICANT CHANGE UP (ref 8–20)
BUN SERPL-MCNC: 6 MG/DL — LOW (ref 8–20)
BUN SERPL-MCNC: 9 MG/DL — SIGNIFICANT CHANGE UP (ref 8–20)
CA-I SERPL-SCNC: 1.25 MMOL/L — SIGNIFICANT CHANGE UP (ref 1.15–1.33)
CALCIUM SERPL-MCNC: 10.2 MG/DL — SIGNIFICANT CHANGE UP (ref 8.6–10.2)
CALCIUM SERPL-MCNC: 7.2 MG/DL — LOW (ref 8.6–10.2)
CALCIUM SERPL-MCNC: 8 MG/DL — LOW (ref 8.6–10.2)
CHLORIDE BLDV-SCNC: 105 MMOL/L — SIGNIFICANT CHANGE UP (ref 98–107)
CHLORIDE SERPL-SCNC: 102 MMOL/L — SIGNIFICANT CHANGE UP (ref 98–107)
CHLORIDE SERPL-SCNC: 104 MMOL/L — SIGNIFICANT CHANGE UP (ref 98–107)
CHLORIDE SERPL-SCNC: 95 MMOL/L — LOW (ref 98–107)
CO2 SERPL-SCNC: 14 MMOL/L — LOW (ref 22–29)
CO2 SERPL-SCNC: 7 MMOL/L — CRITICAL LOW (ref 22–29)
CO2 SERPL-SCNC: 7 MMOL/L — CRITICAL LOW (ref 22–29)
COCAINE METAB.OTHER UR-MCNC: NEGATIVE — SIGNIFICANT CHANGE UP
COLOR SPEC: YELLOW — SIGNIFICANT CHANGE UP
CREAT SERPL-MCNC: 0.57 MG/DL — SIGNIFICANT CHANGE UP (ref 0.5–1.3)
CREAT SERPL-MCNC: 0.66 MG/DL — SIGNIFICANT CHANGE UP (ref 0.5–1.3)
CREAT SERPL-MCNC: 0.94 MG/DL — SIGNIFICANT CHANGE UP (ref 0.5–1.3)
DIFF PNL FLD: NEGATIVE — SIGNIFICANT CHANGE UP
EOSINOPHIL # BLD AUTO: 0 K/UL — SIGNIFICANT CHANGE UP (ref 0–0.5)
EOSINOPHIL NFR BLD AUTO: 0.1 % — SIGNIFICANT CHANGE UP (ref 0–6)
EPI CELLS # UR: SIGNIFICANT CHANGE UP
GAS PNL BLDV: 137 MMOL/L — SIGNIFICANT CHANGE UP (ref 135–145)
GAS PNL BLDV: SIGNIFICANT CHANGE UP
GAS PNL BLDV: SIGNIFICANT CHANGE UP
GLUCOSE BLDC GLUCOMTR-MCNC: 170 MG/DL — HIGH (ref 70–99)
GLUCOSE BLDC GLUCOMTR-MCNC: 177 MG/DL — HIGH (ref 70–99)
GLUCOSE BLDC GLUCOMTR-MCNC: 183 MG/DL — HIGH (ref 70–99)
GLUCOSE BLDC GLUCOMTR-MCNC: 206 MG/DL — HIGH (ref 70–99)
GLUCOSE BLDC GLUCOMTR-MCNC: 215 MG/DL — HIGH (ref 70–99)
GLUCOSE BLDC GLUCOMTR-MCNC: 223 MG/DL — HIGH (ref 70–99)
GLUCOSE BLDV-MCNC: 309 MG/DL — HIGH (ref 70–99)
GLUCOSE SERPL-MCNC: 163 MG/DL — HIGH (ref 70–115)
GLUCOSE SERPL-MCNC: 225 MG/DL — HIGH (ref 70–115)
GLUCOSE SERPL-MCNC: 302 MG/DL — HIGH (ref 70–115)
GLUCOSE UR QL: 1000 MG/DL
HCO3 BLDV-SCNC: 9 MMOL/L — LOW (ref 21–29)
HCT VFR BLD CALC: 50.2 % — SIGNIFICANT CHANGE UP (ref 42–52)
HCT VFR BLDA CALC: 55 — HIGH (ref 39–50)
HGB BLD CALC-MCNC: 18.1 G/DL — HIGH (ref 13–17)
HGB BLD-MCNC: 17.3 G/DL — SIGNIFICANT CHANGE UP (ref 14–18)
KETONES UR-MCNC: ABNORMAL
LACTATE BLDV-MCNC: 2.2 MMOL/L — HIGH (ref 0.5–2)
LEUKOCYTE ESTERASE UR-ACNC: NEGATIVE — SIGNIFICANT CHANGE UP
LYMPHOCYTES # BLD AUTO: 14 % — LOW (ref 20–55)
LYMPHOCYTES # BLD AUTO: 3 K/UL — SIGNIFICANT CHANGE UP (ref 1–4.8)
MAGNESIUM SERPL-MCNC: 1.4 MG/DL — LOW (ref 1.6–2.6)
MAGNESIUM SERPL-MCNC: 2.9 MG/DL — HIGH (ref 1.6–2.6)
MCHC RBC-ENTMCNC: 32.4 PG — HIGH (ref 27–31)
MCHC RBC-ENTMCNC: 34.5 G/DL — SIGNIFICANT CHANGE UP (ref 32–36)
MCV RBC AUTO: 94 FL — SIGNIFICANT CHANGE UP (ref 80–94)
METHADONE UR-MCNC: NEGATIVE — SIGNIFICANT CHANGE UP
MONOCYTES # BLD AUTO: 1 K/UL — HIGH (ref 0–0.8)
MONOCYTES NFR BLD AUTO: 4.6 % — SIGNIFICANT CHANGE UP (ref 3–10)
NEUTROPHILS # BLD AUTO: 16.9 K/UL — HIGH (ref 1.8–8)
NEUTROPHILS NFR BLD AUTO: 79.6 % — HIGH (ref 37–73)
NITRITE UR-MCNC: NEGATIVE — SIGNIFICANT CHANGE UP
OPIATES UR-MCNC: NEGATIVE — SIGNIFICANT CHANGE UP
OTHER CELLS CSF MANUAL: 19 ML/DL — SIGNIFICANT CHANGE UP (ref 18–22)
PCO2 BLDV: 35 MMHG — SIGNIFICANT CHANGE UP (ref 35–50)
PCP SPEC-MCNC: SIGNIFICANT CHANGE UP
PCP UR-MCNC: NEGATIVE — SIGNIFICANT CHANGE UP
PH BLDV: 6.94 — CRITICAL LOW (ref 7.32–7.43)
PH UR: 5 — SIGNIFICANT CHANGE UP (ref 5–8)
PHOSPHATE SERPL-MCNC: 1.6 MG/DL — LOW (ref 2.4–4.7)
PHOSPHATE SERPL-MCNC: 1.9 MG/DL — LOW (ref 2.4–4.7)
PLATELET # BLD AUTO: 257 K/UL — SIGNIFICANT CHANGE UP (ref 150–400)
PO2 BLDV: 53 MMHG — HIGH (ref 25–45)
POTASSIUM BLDV-SCNC: 5.5 MMOL/L — HIGH (ref 3.4–4.5)
POTASSIUM SERPL-MCNC: 3.5 MMOL/L — SIGNIFICANT CHANGE UP (ref 3.5–5.3)
POTASSIUM SERPL-MCNC: 3.8 MMOL/L — SIGNIFICANT CHANGE UP (ref 3.5–5.3)
POTASSIUM SERPL-MCNC: 5.5 MMOL/L — HIGH (ref 3.5–5.3)
POTASSIUM SERPL-SCNC: 3.5 MMOL/L — SIGNIFICANT CHANGE UP (ref 3.5–5.3)
POTASSIUM SERPL-SCNC: 3.8 MMOL/L — SIGNIFICANT CHANGE UP (ref 3.5–5.3)
POTASSIUM SERPL-SCNC: 5.5 MMOL/L — HIGH (ref 3.5–5.3)
PROT UR-MCNC: 100 MG/DL
RBC # BLD: 5.34 M/UL — SIGNIFICANT CHANGE UP (ref 4.6–6.2)
RBC # FLD: 13.3 % — SIGNIFICANT CHANGE UP (ref 11–15.6)
RBC CASTS # UR COMP ASSIST: NEGATIVE /HPF — SIGNIFICANT CHANGE UP (ref 0–4)
SAO2 % BLDV: 79 % — SIGNIFICANT CHANGE UP
SODIUM SERPL-SCNC: 131 MMOL/L — LOW (ref 135–145)
SODIUM SERPL-SCNC: 132 MMOL/L — LOW (ref 135–145)
SODIUM SERPL-SCNC: 133 MMOL/L — LOW (ref 135–145)
SP GR SPEC: 1.02 — SIGNIFICANT CHANGE UP (ref 1.01–1.02)
THC UR QL: POSITIVE
UROBILINOGEN FLD QL: NEGATIVE MG/DL — SIGNIFICANT CHANGE UP
WBC # BLD: 21.3 K/UL — HIGH (ref 4.8–10.8)
WBC # FLD AUTO: 21.3 K/UL — HIGH (ref 4.8–10.8)
WBC UR QL: SIGNIFICANT CHANGE UP

## 2019-05-30 PROCEDURE — 93010 ELECTROCARDIOGRAM REPORT: CPT

## 2019-05-30 PROCEDURE — 99284 EMERGENCY DEPT VISIT MOD MDM: CPT

## 2019-05-30 PROCEDURE — 71045 X-RAY EXAM CHEST 1 VIEW: CPT | Mod: 26

## 2019-05-30 RX ORDER — CHLORHEXIDINE GLUCONATE 213 G/1000ML
1 SOLUTION TOPICAL DAILY
Refills: 0 | Status: DISCONTINUED | OUTPATIENT
Start: 2019-05-30 | End: 2019-05-30

## 2019-05-30 RX ORDER — SODIUM BICARBONATE 1 MEQ/ML
50 SYRINGE (ML) INTRAVENOUS ONCE
Refills: 0 | Status: COMPLETED | OUTPATIENT
Start: 2019-05-30 | End: 2019-05-30

## 2019-05-30 RX ORDER — DEXTROSE 50 % IN WATER 50 %
12.5 SYRINGE (ML) INTRAVENOUS ONCE
Refills: 0 | Status: DISCONTINUED | OUTPATIENT
Start: 2019-05-30 | End: 2019-06-01

## 2019-05-30 RX ORDER — INSULIN HUMAN 100 [IU]/ML
5 INJECTION, SOLUTION SUBCUTANEOUS
Qty: 100 | Refills: 0 | Status: DISCONTINUED | OUTPATIENT
Start: 2019-05-30 | End: 2019-05-31

## 2019-05-30 RX ORDER — DEXTROSE 50 % IN WATER 50 %
15 SYRINGE (ML) INTRAVENOUS ONCE
Refills: 0 | Status: DISCONTINUED | OUTPATIENT
Start: 2019-05-30 | End: 2019-06-01

## 2019-05-30 RX ORDER — SODIUM CHLORIDE 9 MG/ML
2000 INJECTION INTRAMUSCULAR; INTRAVENOUS; SUBCUTANEOUS
Refills: 0 | Status: DISCONTINUED | OUTPATIENT
Start: 2019-05-30 | End: 2019-05-30

## 2019-05-30 RX ORDER — CHLORHEXIDINE GLUCONATE 213 G/1000ML
1 SOLUTION TOPICAL
Refills: 0 | Status: DISCONTINUED | OUTPATIENT
Start: 2019-05-30 | End: 2019-05-30

## 2019-05-30 RX ORDER — SODIUM CHLORIDE 9 MG/ML
1000 INJECTION, SOLUTION INTRAVENOUS
Refills: 0 | Status: DISCONTINUED | OUTPATIENT
Start: 2019-05-30 | End: 2019-05-31

## 2019-05-30 RX ORDER — NICOTINE POLACRILEX 2 MG
1 GUM BUCCAL DAILY
Refills: 0 | Status: DISCONTINUED | OUTPATIENT
Start: 2019-05-30 | End: 2019-05-31

## 2019-05-30 RX ORDER — GLUCAGON INJECTION, SOLUTION 0.5 MG/.1ML
1 INJECTION, SOLUTION SUBCUTANEOUS ONCE
Refills: 0 | Status: DISCONTINUED | OUTPATIENT
Start: 2019-05-30 | End: 2019-06-01

## 2019-05-30 RX ORDER — MAGNESIUM SULFATE 500 MG/ML
4 VIAL (ML) INJECTION ONCE
Refills: 0 | Status: COMPLETED | OUTPATIENT
Start: 2019-05-30 | End: 2019-05-30

## 2019-05-30 RX ORDER — SODIUM CHLORIDE 9 MG/ML
1000 INJECTION, SOLUTION INTRAVENOUS
Refills: 0 | Status: DISCONTINUED | OUTPATIENT
Start: 2019-05-30 | End: 2019-05-30

## 2019-05-30 RX ORDER — SODIUM CHLORIDE 9 MG/ML
2000 INJECTION, SOLUTION INTRAVENOUS ONCE
Refills: 0 | Status: COMPLETED | OUTPATIENT
Start: 2019-05-30 | End: 2019-05-30

## 2019-05-30 RX ORDER — SODIUM CHLORIDE 9 MG/ML
1000 INJECTION, SOLUTION INTRAVENOUS
Refills: 0 | Status: DISCONTINUED | OUTPATIENT
Start: 2019-05-30 | End: 2019-06-01

## 2019-05-30 RX ORDER — SODIUM CHLORIDE 9 MG/ML
2000 INJECTION INTRAMUSCULAR; INTRAVENOUS; SUBCUTANEOUS ONCE
Refills: 0 | Status: COMPLETED | OUTPATIENT
Start: 2019-05-30 | End: 2019-05-30

## 2019-05-30 RX ORDER — DEXTROSE 50 % IN WATER 50 %
25 SYRINGE (ML) INTRAVENOUS ONCE
Refills: 0 | Status: DISCONTINUED | OUTPATIENT
Start: 2019-05-30 | End: 2019-06-01

## 2019-05-30 RX ORDER — INSULIN HUMAN 100 [IU]/ML
5 INJECTION, SOLUTION SUBCUTANEOUS ONCE
Refills: 0 | Status: COMPLETED | OUTPATIENT
Start: 2019-05-30 | End: 2019-05-30

## 2019-05-30 RX ORDER — MAGNESIUM SULFATE 500 MG/ML
4 VIAL (ML) INJECTION ONCE
Refills: 0 | Status: DISCONTINUED | OUTPATIENT
Start: 2019-05-30 | End: 2019-05-30

## 2019-05-30 RX ADMIN — Medication 1 PATCH: at 18:44

## 2019-05-30 RX ADMIN — SODIUM CHLORIDE 150 MILLILITER(S): 9 INJECTION, SOLUTION INTRAVENOUS at 18:45

## 2019-05-30 RX ADMIN — Medication 85 MILLIMOLE(S): at 22:33

## 2019-05-30 RX ADMIN — SODIUM CHLORIDE 2000 MILLILITER(S): 9 INJECTION INTRAMUSCULAR; INTRAVENOUS; SUBCUTANEOUS at 14:28

## 2019-05-30 RX ADMIN — Medication 50 GRAM(S): at 20:47

## 2019-05-30 RX ADMIN — SODIUM CHLORIDE 2000 MILLILITER(S): 9 INJECTION, SOLUTION INTRAVENOUS at 20:06

## 2019-05-30 RX ADMIN — INSULIN HUMAN 5 UNIT(S)/HR: 100 INJECTION, SOLUTION SUBCUTANEOUS at 15:30

## 2019-05-30 RX ADMIN — INSULIN HUMAN 5 UNIT(S): 100 INJECTION, SOLUTION SUBCUTANEOUS at 16:01

## 2019-05-30 RX ADMIN — Medication 50 MILLIEQUIVALENT(S): at 16:01

## 2019-05-30 NOTE — ED PROVIDER NOTE - OBJECTIVE STATEMENT
24 Y/o male pMH of DM I on insuline present in ER and request his insuline to be refill . states he is taking 2 types long acting at the bed time and Humalog insuline x 3 times a day . states his MD was out of town / he could not follow up and get refill . last time he used his long acting was x 2 nights ago and last times he took his Humalog that he had left was last night . states she feels some SOB denies any other compliant include abdominal pain , nausea vomiting diarrhea , or any other concern 24 Y/o male pMH of DM I on insuline present in ER and request his insuline to be refill . states he is taking 2 types long acting at the bed time and Humalog insuline x 3 times a day . states his MD was out of town / he could not follow up and get refill . last time he used his long acting was x 2 nights ago and last times he took his Humalog that he had left was last night . states she feels dry mouth some SOB denies any other compliant include abdominal pain , nausea vomiting diarrhea , or any other concern

## 2019-05-30 NOTE — ED PROVIDER NOTE - CLINICAL SUMMARY MEDICAL DECISION MAKING FREE TEXT BOX
22 Y/o male request his insulin been refilled that 24 Y/o male request his insulin been refilled - and feeling dry mouth and some SOB R.O DKA  FS/ labs/ FLUIDS  re eval 24 Y/o male request his insulin been refilled - and feeling dry mouth and some SOB R.O DKA  FS/ labs/ FLUIDS  re eval---upgrade to cc for dka

## 2019-05-30 NOTE — H&P ADULT - NSHPSOCIALHISTORY_GEN_ALL_CORE
Pt is a single 24 yo M, lives by himself in a house. Pt smokes 7 cigarettes x day for the last 10 years. He also smokes marihuana 2-3 times a week x the last 10 year, being the last time 3 days ago. Pt is a recovered alcoholic and his last drink was 15 months ago. Pt has a regular diet that consist of all types of meats, vegetables and fruits. He try to avoid carbohydrates due to his DM.

## 2019-05-30 NOTE — H&P ADULT - ATTENDING COMMENTS
I saw this patient independently and agree with the above.  Will need endocrine followup as last time -- repeat electrolytes pending now, anion gap downtrending.

## 2019-05-30 NOTE — ED PROVIDER NOTE - PROGRESS NOTE DETAILS
lab resulted / pt has most likely has DKA . sent the pt to the critical area uneroded to DR Cervantes for further care and evaluation . added EKG , CXR fluids and insuline

## 2019-05-30 NOTE — ED ADULT NURSE REASSESSMENT NOTE - REASSESS COMMUNICATION
EICU ICU PA aware of pt repeat accu check 215, instructed to increase insulin gtt to 6units/hr.
assumed care of pt from virgil RN. pt a&ox3. aware of POC, awaiting repeat BMP results. remains on D5 1/2NS @ 150cc/hr and insulin gtt 5 units/hr. will continue to monitor.

## 2019-05-30 NOTE — H&P ADULT - HISTORY OF PRESENT ILLNESS
Pt is a 22 yo M w/ PMHx of DM 1 and alcoholism (pt states not drinking alcohol x last 15 months) who came to the ER co chest tightness since today at 10:00 AM. Pt states that drinking  water has helped him with his symptoms. He went to Emergency Care this morning trying to get a prescription for his short acting insuline (Humalog) but they were unable to provide the prescription and he was advised to visit his primary care doctor. Pt states run out of Humalog last nigh and missed his last 2 doses.  Pt also mentioned to pull a tick from his R pelvic area last nigh. He recall being in the woods this week but is not sure for how long that tick was in his skin.  Associated symptoms palpitations, mild HA, dry mouth, nauseas and polyuria.  Denies cp, sob, pain, fever, chills, d/v or any other medical complaint at this time.

## 2019-05-30 NOTE — ED PROVIDER NOTE - ATTENDING CONTRIBUTION TO CARE
pt presents with medication refill.  pt also reports dry mouth, being tired, and reports last insuling yesterday.  Mild tachy in ed  on pabs--with ph 6.94 and ketones  cw diabetic ketoacidosis  plan: upgrade to estephania herbert charge nurse and dr meet bernard

## 2019-05-30 NOTE — ED PROVIDER NOTE - CONSTITUTIONAL, MLM
normal... Well appearing, well nourished, awake, alert, oriented to person, place, time/situation and in no apparent distress. thin appear NAD

## 2019-05-30 NOTE — H&P ADULT - PROBLEM SELECTOR PLAN 1
IV Fluids  Insuline drip  Monitor glucose  Check pending labs IV Fluids resuscitation then 250cc/hour  Insulin drip until AG closed  Monitor glucose hourly   Check pending labs every 6 hours and replete electrolytes as needed  Endocrine eval in am

## 2019-05-30 NOTE — ED PROVIDER NOTE - CARE PLAN
Principal Discharge DX:	Hyperglycemia  Secondary Diagnosis:	Medication refill Principal Discharge DX:	Diabetic ketoacidosis without coma associated with type 1 diabetes mellitus  Secondary Diagnosis:	Medication refill

## 2019-05-30 NOTE — H&P ADULT - SKIN COMMENTS
right hip area small erythematous area where pt states he removed a tick yesterday, no drainage, no rash just tiny area erythema around implant site, no reminants of tick in skin

## 2019-05-30 NOTE — ED ADULT NURSE NOTE - OBJECTIVE STATEMENT
Patient presents to ER for Insulin refill, states his PMD is on vacation, while in the ER BS found to be 288, reports mild lower back pain, denies urinary symptoms, no N/V.

## 2019-05-31 DIAGNOSIS — D64.9 ANEMIA, UNSPECIFIED: ICD-10-CM

## 2019-05-31 DIAGNOSIS — E87.6 HYPOKALEMIA: ICD-10-CM

## 2019-05-31 DIAGNOSIS — E10.10 TYPE 1 DIABETES MELLITUS WITH KETOACIDOSIS WITHOUT COMA: ICD-10-CM
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
## 2019-05-31 LAB
ALBUMIN SERPL ELPH-MCNC: 3.7 G/DL — SIGNIFICANT CHANGE UP (ref 3.3–5.2)
ALP SERPL-CCNC: 83 U/L — SIGNIFICANT CHANGE UP (ref 40–120)
ALT FLD-CCNC: 10 U/L — SIGNIFICANT CHANGE UP
ANION GAP SERPL CALC-SCNC: 11 MMOL/L — SIGNIFICANT CHANGE UP (ref 5–17)
ANION GAP SERPL CALC-SCNC: 11 MMOL/L — SIGNIFICANT CHANGE UP (ref 5–17)
AST SERPL-CCNC: 17 U/L — SIGNIFICANT CHANGE UP
B-OH-BUTYR SERPL-SCNC: 0.3 MMOL/L — SIGNIFICANT CHANGE UP
BASOPHILS # BLD AUTO: 0 K/UL — SIGNIFICANT CHANGE UP (ref 0–0.2)
BASOPHILS # BLD AUTO: 0 K/UL — SIGNIFICANT CHANGE UP (ref 0–0.2)
BASOPHILS NFR BLD AUTO: 0.1 % — SIGNIFICANT CHANGE UP (ref 0–2)
BASOPHILS NFR BLD AUTO: 0.2 % — SIGNIFICANT CHANGE UP (ref 0–2)
BILIRUB SERPL-MCNC: <0.2 MG/DL — LOW (ref 0.4–2)
BUN SERPL-MCNC: 5 MG/DL — LOW (ref 8–20)
BUN SERPL-MCNC: 6 MG/DL — LOW (ref 8–20)
CALCIUM SERPL-MCNC: 7.5 MG/DL — LOW (ref 8.6–10.2)
CALCIUM SERPL-MCNC: 8.5 MG/DL — LOW (ref 8.6–10.2)
CHLORIDE SERPL-SCNC: 104 MMOL/L — SIGNIFICANT CHANGE UP (ref 98–107)
CHLORIDE SERPL-SCNC: 108 MMOL/L — HIGH (ref 98–107)
CO2 SERPL-SCNC: 19 MMOL/L — LOW (ref 22–29)
CO2 SERPL-SCNC: 22 MMOL/L — SIGNIFICANT CHANGE UP (ref 22–29)
CREAT SERPL-MCNC: 0.45 MG/DL — LOW (ref 0.5–1.3)
CREAT SERPL-MCNC: 0.51 MG/DL — SIGNIFICANT CHANGE UP (ref 0.5–1.3)
EOSINOPHIL # BLD AUTO: 0.1 K/UL — SIGNIFICANT CHANGE UP (ref 0–0.5)
EOSINOPHIL # BLD AUTO: 0.1 K/UL — SIGNIFICANT CHANGE UP (ref 0–0.5)
EOSINOPHIL NFR BLD AUTO: 0.6 % — SIGNIFICANT CHANGE UP (ref 0–5)
EOSINOPHIL NFR BLD AUTO: 0.8 % — SIGNIFICANT CHANGE UP (ref 0–6)
FERRITIN SERPL-MCNC: 174 NG/ML — SIGNIFICANT CHANGE UP (ref 30–400)
GLUCOSE BLDC GLUCOMTR-MCNC: 103 MG/DL — HIGH (ref 70–99)
GLUCOSE BLDC GLUCOMTR-MCNC: 124 MG/DL — HIGH (ref 70–99)
GLUCOSE BLDC GLUCOMTR-MCNC: 144 MG/DL — HIGH (ref 70–99)
GLUCOSE BLDC GLUCOMTR-MCNC: 147 MG/DL — HIGH (ref 70–99)
GLUCOSE BLDC GLUCOMTR-MCNC: 158 MG/DL — HIGH (ref 70–99)
GLUCOSE BLDC GLUCOMTR-MCNC: 188 MG/DL — HIGH (ref 70–99)
GLUCOSE BLDC GLUCOMTR-MCNC: 199 MG/DL — HIGH (ref 70–99)
GLUCOSE BLDC GLUCOMTR-MCNC: 214 MG/DL — HIGH (ref 70–99)
GLUCOSE BLDC GLUCOMTR-MCNC: 219 MG/DL — HIGH (ref 70–99)
GLUCOSE BLDC GLUCOMTR-MCNC: 322 MG/DL — HIGH (ref 70–99)
GLUCOSE BLDC GLUCOMTR-MCNC: 90 MG/DL — SIGNIFICANT CHANGE UP (ref 70–99)
GLUCOSE SERPL-MCNC: 142 MG/DL — HIGH (ref 70–115)
GLUCOSE SERPL-MCNC: 147 MG/DL — HIGH (ref 70–115)
HBA1C BLD-MCNC: 14.8 % — HIGH (ref 4–5.6)
HCT VFR BLD CALC: 35.2 % — LOW (ref 42–52)
HCT VFR BLD CALC: 38.3 % — LOW (ref 42–52)
HGB BLD-MCNC: 12.7 G/DL — LOW (ref 14–18)
HGB BLD-MCNC: 13.2 G/DL — LOW (ref 14–18)
IRON SATN MFR SERPL: 20 % — SIGNIFICANT CHANGE UP (ref 16–55)
IRON SATN MFR SERPL: 52 UG/DL — LOW (ref 59–158)
LYMPHOCYTES # BLD AUTO: 1.9 K/UL — SIGNIFICANT CHANGE UP (ref 1–4.8)
LYMPHOCYTES # BLD AUTO: 19.7 % — LOW (ref 20–55)
LYMPHOCYTES # BLD AUTO: 2.6 K/UL — SIGNIFICANT CHANGE UP (ref 1–4.8)
LYMPHOCYTES # BLD AUTO: 21.4 % — SIGNIFICANT CHANGE UP (ref 20–55)
MAGNESIUM SERPL-MCNC: 2 MG/DL — SIGNIFICANT CHANGE UP (ref 1.6–2.6)
MCHC RBC-ENTMCNC: 31.4 PG — HIGH (ref 27–31)
MCHC RBC-ENTMCNC: 32.6 PG — HIGH (ref 27–31)
MCHC RBC-ENTMCNC: 34.5 G/DL — SIGNIFICANT CHANGE UP (ref 32–36)
MCHC RBC-ENTMCNC: 36.1 G/DL — HIGH (ref 32–36)
MCV RBC AUTO: 90.3 FL — SIGNIFICANT CHANGE UP (ref 80–94)
MCV RBC AUTO: 91.2 FL — SIGNIFICANT CHANGE UP (ref 80–94)
MONOCYTES # BLD AUTO: 0.8 K/UL — SIGNIFICANT CHANGE UP (ref 0–0.8)
MONOCYTES # BLD AUTO: 1.1 K/UL — HIGH (ref 0–0.8)
MONOCYTES NFR BLD AUTO: 8.1 % — SIGNIFICANT CHANGE UP (ref 3–10)
MONOCYTES NFR BLD AUTO: 9.3 % — SIGNIFICANT CHANGE UP (ref 3–10)
NEUTROPHILS # BLD AUTO: 7 K/UL — SIGNIFICANT CHANGE UP (ref 1.8–8)
NEUTROPHILS # BLD AUTO: 8.2 K/UL — HIGH (ref 1.8–8)
NEUTROPHILS NFR BLD AUTO: 67.7 % — SIGNIFICANT CHANGE UP (ref 37–73)
NEUTROPHILS NFR BLD AUTO: 71.2 % — SIGNIFICANT CHANGE UP (ref 37–73)
PHOSPHATE SERPL-MCNC: 3.5 MG/DL — SIGNIFICANT CHANGE UP (ref 2.4–4.7)
PLATELET # BLD AUTO: 194 K/UL — SIGNIFICANT CHANGE UP (ref 150–400)
PLATELET # BLD AUTO: 199 K/UL — SIGNIFICANT CHANGE UP (ref 150–400)
POTASSIUM SERPL-MCNC: 3 MMOL/L — LOW (ref 3.5–5.3)
POTASSIUM SERPL-MCNC: 3.9 MMOL/L — SIGNIFICANT CHANGE UP (ref 3.5–5.3)
POTASSIUM SERPL-SCNC: 3 MMOL/L — LOW (ref 3.5–5.3)
POTASSIUM SERPL-SCNC: 3.9 MMOL/L — SIGNIFICANT CHANGE UP (ref 3.5–5.3)
PROT SERPL-MCNC: 5.7 G/DL — LOW (ref 6.6–8.7)
RBC # BLD: 3.9 M/UL — LOW (ref 4.6–6.2)
RBC # BLD: 4.2 M/UL — LOW (ref 4.6–6.2)
RBC # FLD: 12.9 % — SIGNIFICANT CHANGE UP (ref 11–15.6)
RBC # FLD: 13.1 % — SIGNIFICANT CHANGE UP (ref 11–15.6)
SODIUM SERPL-SCNC: 137 MMOL/L — SIGNIFICANT CHANGE UP (ref 135–145)
SODIUM SERPL-SCNC: 138 MMOL/L — SIGNIFICANT CHANGE UP (ref 135–145)
TIBC SERPL-MCNC: 256 UG/DL — SIGNIFICANT CHANGE UP (ref 220–430)
TRANSFERRIN SERPL-MCNC: 179 MG/DL — LOW (ref 180–329)
WBC # BLD: 12.2 K/UL — HIGH (ref 4.8–10.8)
WBC # BLD: 9.8 K/UL — SIGNIFICANT CHANGE UP (ref 4.8–10.8)
WBC # FLD AUTO: 12.2 K/UL — HIGH (ref 4.8–10.8)
WBC # FLD AUTO: 9.8 K/UL — SIGNIFICANT CHANGE UP (ref 4.8–10.8)

## 2019-05-31 PROCEDURE — 99222 1ST HOSP IP/OBS MODERATE 55: CPT

## 2019-05-31 RX ORDER — INSULIN GLARGINE 100 [IU]/ML
10 INJECTION, SOLUTION SUBCUTANEOUS EVERY MORNING
Refills: 0 | Status: DISCONTINUED | OUTPATIENT
Start: 2019-05-31 | End: 2019-05-31

## 2019-05-31 RX ORDER — INSULIN LISPRO 100/ML
3 VIAL (ML) SUBCUTANEOUS
Refills: 0 | Status: DISCONTINUED | OUTPATIENT
Start: 2019-05-31 | End: 2019-06-01

## 2019-05-31 RX ORDER — SODIUM CHLORIDE 9 MG/ML
1000 INJECTION, SOLUTION INTRAVENOUS
Refills: 0 | Status: DISCONTINUED | OUTPATIENT
Start: 2019-05-31 | End: 2019-05-31

## 2019-05-31 RX ORDER — FAMOTIDINE 10 MG/ML
20 INJECTION INTRAVENOUS DAILY
Refills: 0 | Status: DISCONTINUED | OUTPATIENT
Start: 2019-05-31 | End: 2019-06-01

## 2019-05-31 RX ORDER — SODIUM CHLORIDE 9 MG/ML
1000 INJECTION, SOLUTION INTRAVENOUS
Refills: 0 | Status: DISCONTINUED | OUTPATIENT
Start: 2019-05-31 | End: 2019-06-01

## 2019-05-31 RX ORDER — INSULIN LISPRO 100/ML
6 VIAL (ML) SUBCUTANEOUS ONCE
Refills: 0 | Status: COMPLETED | OUTPATIENT
Start: 2019-05-31 | End: 2019-05-31

## 2019-05-31 RX ORDER — INSULIN LISPRO 100/ML
VIAL (ML) SUBCUTANEOUS
Refills: 0 | Status: DISCONTINUED | OUTPATIENT
Start: 2019-05-31 | End: 2019-06-01

## 2019-05-31 RX ORDER — POTASSIUM CHLORIDE 20 MEQ
40 PACKET (EA) ORAL ONCE
Refills: 0 | Status: COMPLETED | OUTPATIENT
Start: 2019-05-31 | End: 2019-05-31

## 2019-05-31 RX ORDER — INSULIN GLARGINE 100 [IU]/ML
20 INJECTION, SOLUTION SUBCUTANEOUS EVERY MORNING
Refills: 0 | Status: DISCONTINUED | OUTPATIENT
Start: 2019-05-31 | End: 2019-06-01

## 2019-05-31 RX ORDER — POTASSIUM CHLORIDE 20 MEQ
10 PACKET (EA) ORAL
Refills: 0 | Status: COMPLETED | OUTPATIENT
Start: 2019-05-31 | End: 2019-05-31

## 2019-05-31 RX ORDER — NICOTINE POLACRILEX 2 MG
1 GUM BUCCAL DAILY
Refills: 0 | Status: DISCONTINUED | OUTPATIENT
Start: 2019-05-31 | End: 2019-06-01

## 2019-05-31 RX ADMIN — INSULIN HUMAN 5 UNIT(S)/HR: 100 INJECTION, SOLUTION SUBCUTANEOUS at 02:42

## 2019-05-31 RX ADMIN — Medication 3 UNIT(S): at 12:02

## 2019-05-31 RX ADMIN — Medication 40 MILLIEQUIVALENT(S): at 05:42

## 2019-05-31 RX ADMIN — Medication 100 MILLIEQUIVALENT(S): at 05:43

## 2019-05-31 RX ADMIN — SODIUM CHLORIDE 100 MILLILITER(S): 9 INJECTION, SOLUTION INTRAVENOUS at 13:52

## 2019-05-31 RX ADMIN — FAMOTIDINE 20 MILLIGRAM(S): 10 INJECTION INTRAVENOUS at 12:01

## 2019-05-31 RX ADMIN — Medication 1 PATCH: at 07:13

## 2019-05-31 RX ADMIN — Medication 100 MILLIEQUIVALENT(S): at 08:09

## 2019-05-31 RX ADMIN — INSULIN GLARGINE 20 UNIT(S): 100 INJECTION, SOLUTION SUBCUTANEOUS at 04:44

## 2019-05-31 RX ADMIN — SODIUM CHLORIDE 150 MILLILITER(S): 9 INJECTION, SOLUTION INTRAVENOUS at 02:41

## 2019-05-31 RX ADMIN — Medication 3 UNIT(S): at 16:52

## 2019-05-31 RX ADMIN — Medication 1 PATCH: at 19:00

## 2019-05-31 RX ADMIN — Medication 3 UNIT(S): at 08:09

## 2019-05-31 RX ADMIN — Medication 100 MILLIEQUIVALENT(S): at 06:53

## 2019-05-31 RX ADMIN — INSULIN HUMAN 5 UNIT(S)/HR: 100 INJECTION, SOLUTION SUBCUTANEOUS at 03:01

## 2019-05-31 RX ADMIN — Medication 1 PATCH: at 13:48

## 2019-05-31 RX ADMIN — Medication 6 UNIT(S): at 22:25

## 2019-05-31 RX ADMIN — Medication 40 MILLIEQUIVALENT(S): at 12:01

## 2019-05-31 NOTE — CONSULT NOTE ADULT - SUBJECTIVE AND OBJECTIVE BOX
24 y/o male with h/o DM I , and past alcohol abusewas admitted to the ICU with DKA and dehydration. today, patient feels well. no nausea, vomiting or abdominal pain. no headache. no body aches. no confusion. h/o tic bit.     HPI:  Pt is a 24 yo M w/ PMHx of DM 1 and alcoholism (pt states not drinking alcohol x last 15 months) who came to the ER co chest tightness since today at 10:00 AM. Pt states that drinking  water has helped him with his symptoms. He went to Emergency Care this morning trying to get a prescription for his short acting insuline (Humalog) but they were unable to provide the prescription and he was advised to visit his primary care doctor. Pt states run out of Humalog last nigh and missed his last 2 doses.  Pt also mentioned to pull a tick from his R pelvic area last nigh. He recall being in the woods this week but is not sure for how long that tick was in his skin.  Associated symptoms palpitations, mild HA, dry mouth, nauseas and polyuria.  Denies cp, sob, pain, fever, chills, d/v or any other medical complaint at this time. (30 May 2019 17:01)        PAST MEDICAL & SURGICAL HISTORY:  Diabetes, type I  Recovering alcoholic  No significant past surgical history        MEDICATIONS  (STANDING):  dextrose 5%. 1000 milliLiter(s) (50 mL/Hr) IV Continuous <Continuous>  dextrose 50% Injectable 12.5 Gram(s) IV Push once  dextrose 50% Injectable 25 Gram(s) IV Push once  dextrose 50% Injectable 25 Gram(s) IV Push once  insulin glargine Injectable (LANTUS) 20 Unit(s) SubCutaneous every morning  insulin lispro (HumaLOG) corrective regimen sliding scale   SubCutaneous three times a day before meals  insulin lispro Injectable (HumaLOG) 3 Unit(s) SubCutaneous before breakfast  insulin lispro Injectable (HumaLOG) 3 Unit(s) SubCutaneous before lunch  insulin lispro Injectable (HumaLOG) 3 Unit(s) SubCutaneous before dinner  nicotine -   7 mG/24Hr(s) Patch 1 patch Transdermal daily    MEDICATIONS  (PRN):  dextrose 40% Gel 15 Gram(s) Oral once PRN Blood Glucose LESS THAN 70 milliGRAM(s)/deciliter  glucagon  Injectable 1 milliGRAM(s) IntraMuscular once PRN Glucose LESS THAN 70 milligrams/deciliter        Allergies    penicillin (Hives)    Intolerances        SOCIAL HISTORY:    FAMILY HISTORY:      Vital Signs Last 24 Hrs  T(C): 36.7 (31 May 2019 09:58), Max: 37 (30 May 2019 22:30)  T(F): 98 (31 May 2019 09:58), Max: 98.6 (30 May 2019 22:30)  HR: 79 (31 May 2019 09:58) (67 - 105)  BP: 109/55 (31 May 2019 09:58) (104/68 - 123/72)  BP(mean): --  RR: 18 (31 May 2019 09:58) (15 - 20)  SpO2: 100% (31 May 2019 09:58) (96% - 100%)    Physical Exam:   GEN: NAD,  HEENT: EOMI. PERRL. throat erythema.   Neck: supple no JVD  CV: S1 S2, RRR. no murmur  Lung: CTA bilaterally  Abd: soft NT ND +BS  Ext: no c/c/e. no calf tenderness  Neuro: no focal neuro deficit  Skin: no rash    LABS:                          12.7   12.2  )-----------( 194      ( 31 May 2019 04:28 )             35.2     05-31    138  |  108<H>  |  5.0<L>  ----------------------------<  142<H>  3.0<L>   |  19.0<L>  |  0.45<L>    Ca    7.5<L>      31 May 2019 04:28  Phos  3.5     05-  Mg     2.0     05-31        Urinalysis Basic - ( 30 May 2019 14:24 )    Color: Yellow / Appearance: Clear / S.025 / pH: x  Gluc: x / Ketone: Large  / Bili: Negative / Urobili: Negative mg/dL   Blood: x / Protein: 100 mg/dL / Nitrite: Negative   Leuk Esterase: Negative / RBC: Negative /HPF / WBC 0-2   Sq Epi: x / Non Sq Epi: Occasional / Bacteria: Negative        Assessment and Plan: 22 y/o male with h/o DM I , and past alcohol abusewas admitted to the ICU with DKA and dehydration. today, patient feels well. no nausea, vomiting or abdominal pain. no headache. no body aches. no confusion. h/o tic bit. anemia was noted. patient denies epigastric pain or melena. K: 3    HPI:  Pt is a 22 yo M w/ PMHx of DM 1 and alcoholism (pt states not drinking alcohol x last 15 months) who came to the ER co chest tightness since today at 10:00 AM. Pt states that drinking  water has helped him with his symptoms. He went to Emergency Care this morning trying to get a prescription for his short acting insuline (Humalog) but they were unable to provide the prescription and he was advised to visit his primary care doctor. Pt states run out of Humalog last nigh and missed his last 2 doses.  Pt also mentioned to pull a tick from his R pelvic area last nigh. He recall being in the woods this week but is not sure for how long that tick was in his skin.  Associated symptoms palpitations, mild HA, dry mouth, nauseas and polyuria.  Denies cp, sob, pain, fever, chills, d/v or any other medical complaint at this time. (30 May 2019 17:01)        PAST MEDICAL & SURGICAL HISTORY:  Diabetes, type I  Recovering alcoholic  No significant past surgical history        MEDICATIONS  (STANDING):  dextrose 5%. 1000 milliLiter(s) (50 mL/Hr) IV Continuous <Continuous>  dextrose 50% Injectable 12.5 Gram(s) IV Push once  dextrose 50% Injectable 25 Gram(s) IV Push once  dextrose 50% Injectable 25 Gram(s) IV Push once  insulin glargine Injectable (LANTUS) 20 Unit(s) SubCutaneous every morning  insulin lispro (HumaLOG) corrective regimen sliding scale   SubCutaneous three times a day before meals  insulin lispro Injectable (HumaLOG) 3 Unit(s) SubCutaneous before breakfast  insulin lispro Injectable (HumaLOG) 3 Unit(s) SubCutaneous before lunch  insulin lispro Injectable (HumaLOG) 3 Unit(s) SubCutaneous before dinner  nicotine -   7 mG/24Hr(s) Patch 1 patch Transdermal daily    MEDICATIONS  (PRN):  dextrose 40% Gel 15 Gram(s) Oral once PRN Blood Glucose LESS THAN 70 milliGRAM(s)/deciliter  glucagon  Injectable 1 milliGRAM(s) IntraMuscular once PRN Glucose LESS THAN 70 milligrams/deciliter        Allergies    penicillin (Hives)    Intolerances        SOCIAL HISTORY:    FAMILY HISTORY:      Vital Signs Last 24 Hrs  T(C): 36.7 (31 May 2019 09:58), Max: 37 (30 May 2019 22:30)  T(F): 98 (31 May 2019 09:58), Max: 98.6 (30 May 2019 22:30)  HR: 79 (31 May 2019 09:58) (67 - 105)  BP: 109/55 (31 May 2019 09:58) (104/68 - 123/72)  BP(mean): --  RR: 18 (31 May 2019 09:58) (15 - 20)  SpO2: 100% (31 May 2019 09:58) (96% - 100%)    Physical Exam:   GEN: NAD,  HEENT: EOMI. PERRL. throat erythema.   Neck: supple no JVD  CV: S1 S2, RRR. no murmur  Lung: CTA bilaterally  Abd: soft NT ND +BS  Ext: no c/c/e. no calf tenderness  Neuro: no focal neuro deficit  Skin: no rash    LABS:                          12.7   12.2  )-----------( 194      ( 31 May 2019 04:28 )             35.2     05-31    138  |  108<H>  |  5.0<L>  ----------------------------<  142<H>  3.0<L>   |  19.0<L>  |  0.45<L>    Ca    7.5<L>      31 May 2019 04:28  Phos  3.5     05-  Mg     2.0     0531        Urinalysis Basic - ( 30 May 2019 14:24 )    Color: Yellow / Appearance: Clear / S.025 / pH: x  Gluc: x / Ketone: Large  / Bili: Negative / Urobili: Negative mg/dL   Blood: x / Protein: 100 mg/dL / Nitrite: Negative   Leuk Esterase: Negative / RBC: Negative /HPF / WBC 0-2   Sq Epi: x / Non Sq Epi: Occasional / Bacteria: Negative        Assessment and Plan:

## 2019-05-31 NOTE — PROVIDER CONTACT NOTE (EICU) - RECOMMENDATIONS
Continue w/ insulin infusion and D5 1/2 NS  Will transition off insulin infusion w/ lantus once AG closed   BMP q6h for now  When off insulin gtt, start Carb control diet  Follow up tick panel  Pt education on importance of medication compliance  Endocrinology consult  Follow up A1C

## 2019-05-31 NOTE — CONSULT NOTE ADULT - ASSESSMENT
23M w/ PMHx of T1DM and alcoholism (pt states not drinking alcohol x last 15 months) who came to the ER co 1day of chest tightness. Ran out of Humalog but was advised by ER to see PMD. patient also removed a tick from his R pelvic area.      Uncontrolled T1DM- acceptable hyperglycemic  -A1c 14.8  -check sugars AC and bedtime  -ensure diabetic diet  -never hold basal insulin/lantus, otherwise DKA can result  -recommend the following     continue lantus 20 units     continue lispro 3 units TID     continue insulin sliding scale  -check TSH    Hypocalcemia- mild, corrected acceptable. monitor for now 23M w/ PMHx of T1DM and alcoholism (pt states not drinking alcohol x last 15 months) who came to the ER co 1day of chest tightness. Ran out of Humalog but was advised by ER to see PMD. patient also removed a tick from his R pelvic area.      Uncontrolled T1DM- acceptable hyperglycemic  -A1c 14.8, due to noncompliance  -check sugars AC and bedtime  -ensure diabetic diet  -never hold basal insulin/lantus, otherwise DKA can result  -recommend the following     continue lantus 20 units     continue lispro 3 units TID     continue insulin sliding scale  -check TSH    Hypocalcemia- mild, corrected acceptable. monitor for now

## 2019-05-31 NOTE — CONSULT NOTE ADULT - SUBJECTIVE AND OBJECTIVE BOX
Patient is a 23y old  Male who presents with a chief complaint of DKA (31 May 2019 11:03)    HPI:  23M w/ PMHx of T1DM and alcoholism (pt states not drinking alcohol x last 15 months) who came to the ER co 1day of chest tightness. Ran out of Humalog but was advised by ER to see PMD. patient also removed a tick from his R pelvic area.        PAST MEDICAL & SURGICAL HISTORY:  Diabetes, type I  Recovering alcoholic  No significant past surgical history      SH: past heavy EtOH use. lives with family    FAMILY HISTORY:  see above      Allergies    penicillin (Hives)    Intolerances        REVIEW OF SYSTEMS:    CONSTITUTIONAL: No fever, weight loss, or fatigue  EYES: No eye pain, visual disturbances, or discharge  ENMT:  No difficulty hearing, tinnitus, vertigo; No sinus or throat pain  NECK: No pain or stiffness  RESPIRATORY: No cough, wheezing, chills or hemoptysis; No shortness of breath  CARDIOVASCULAR: No chest pain, palpitations, dizziness, or leg swelling  GASTROINTESTINAL: No abdominal or epigastric pain. No nausea, vomiting, or hematemesis; No diarrhea or constipation. No melena or hematochezia.  NEUROLOGICAL: No headaches, memory loss, loss of strength, numbness, or tremors  SKIN: No itching, burning, rashes, or lesions   MUSCULOSKELETAL: No joint pain or swelling; No muscle, back, or extremity pain  PSYCHIATRIC: No depression, anxiety, mood swings, or difficulty sleeping        MEDICATIONS  (STANDING):  dextrose 5%. 1000 milliLiter(s) (50 mL/Hr) IV Continuous <Continuous>  dextrose 50% Injectable 12.5 Gram(s) IV Push once  dextrose 50% Injectable 25 Gram(s) IV Push once  dextrose 50% Injectable 25 Gram(s) IV Push once  famotidine    Tablet 20 milliGRAM(s) Oral daily  insulin glargine Injectable (LANTUS) 20 Unit(s) SubCutaneous every morning  insulin lispro (HumaLOG) corrective regimen sliding scale   SubCutaneous three times a day before meals  insulin lispro Injectable (HumaLOG) 3 Unit(s) SubCutaneous before breakfast  insulin lispro Injectable (HumaLOG) 3 Unit(s) SubCutaneous before lunch  insulin lispro Injectable (HumaLOG) 3 Unit(s) SubCutaneous before dinner  multiple electrolytes Injection Type 1 1000 milliLiter(s) (100 mL/Hr) IV Continuous <Continuous>  nicotine -   7 mG/24Hr(s) Patch 1 patch Transdermal daily    MEDICATIONS  (PRN):  dextrose 40% Gel 15 Gram(s) Oral once PRN Blood Glucose LESS THAN 70 milliGRAM(s)/deciliter  glucagon  Injectable 1 milliGRAM(s) IntraMuscular once PRN Glucose LESS THAN 70 milligrams/deciliter      Vital Signs Last 24 Hrs  T(C): 36.7 (31 May 2019 09:58), Max: 37 (30 May 2019 22:30)  T(F): 98 (31 May 2019 09:58), Max: 98.6 (30 May 2019 22:30)  HR: 79 (31 May 2019 09:58) (67 - 87)  BP: 109/55 (31 May 2019 09:58) (104/68 - 123/72)  BP(mean): --  RR: 18 (31 May 2019 09:58) (15 - 20)  SpO2: 100% (31 May 2019 09:58) (98% - 100%)      PHYSICAL EXAM:    Constitutional: NAD, well-groomed, well-developed  HEENT: EOMI, no exophalmos  Neck: No LAD, No JVD, trachea midline, no thyroid enlargement  Respiratory: CTAB  Cardiovascular: S1 and S2, RRR, no M/G/R  Gastrointestinal: BS+, soft, ntnd  Extremities: No peripheral edema  Neurological: A/O x 3, no focal deficits  Psychiatric: Normal mood, normal affect  Skin: No rashes, no acanthosis        LABS  05-31    137  |  104  |  6.0<L>  ----------------------------<  147<H>  3.9   |  22.0  |  0.51    Ca    8.5<L>      31 May 2019 11:51  Phos  3.5     05-31  Mg     2.0     05-31    TPro  5.7<L>  /  Alb  3.7  /  TBili  <0.2<L>  /  DBili  x   /  AST  17  /  ALT  10  /  AlkPhos  83  05-31                          13.2   9.8   )-----------( 199      ( 31 May 2019 11:51 )             38.3           Hemoglobin A1C, Whole Blood: 14.8 % (05-31 @ 04:29)    Ketone - Urine: Large (05-30 @ 14:24)    Alkaline Phosphatase, Serum: 83 U/L (05-31-19 @ 11:51)  Alanine Aminotransferase (ALT/SGPT): 10 U/L (05-31-19 @ 11:51)  Aspartate Aminotransferase (AST/SGOT): 17 U/L (05-31-19 @ 11:51)  Albumin, Serum: 3.7 g/dL (05-31-19 @ 11:51)      CAPILLARY BLOOD GLUCOSE      POCT Blood Glucose.: 124 mg/dL (31 May 2019 12:00)  POCT Blood Glucose.: 214 mg/dL (31 May 2019 09:20)  POCT Blood Glucose.: 219 mg/dL (31 May 2019 07:01)  POCT Blood Glucose.: 90 mg/dL (31 May 2019 06:16)  POCT Blood Glucose.: 103 mg/dL (31 May 2019 05:24)  POCT Blood Glucose.: 158 mg/dL (31 May 2019 04:14)  POCT Blood Glucose.: 144 mg/dL (31 May 2019 02:54)  POCT Blood Glucose.: 188 mg/dL (31 May 2019 01:36)  POCT Blood Glucose.: 199 mg/dL (31 May 2019 00:32)  POCT Blood Glucose.: 223 mg/dL (30 May 2019 23:38)  POCT Blood Glucose.: 215 mg/dL (30 May 2019 22:29)  POCT Blood Glucose.: 183 mg/dL (30 May 2019 21:29)  POCT Blood Glucose.: 206 mg/dL (30 May 2019 20:16)  POCT Blood Glucose.: 177 mg/dL (30 May 2019 19:06)  POCT Blood Glucose.: 170 mg/dL (30 May 2019 18:02)  POCT Blood Glucose.: 192 mg/dL (30 May 2019 16:59)  POCT Blood Glucose.: 269 mg/dL (30 May 2019 15:58)  POCT Blood Glucose.: 288 mg/dL (30 May 2019 13:22) Patient is a 23y old  Male who presents with a chief complaint of DKA (31 May 2019 11:03)    HPI:  23M w/ PMHx of T1DM and alcoholism (pt states not drinking alcohol x last 15 months) who came to the ER co 1day of chest tightness. Ran out of Humalog but was advised by ER to see PMD. patient also removed a tick from his R pelvic area.    diagnosed with diabetes in 2016  follows with Dr. Wallace in the clinic but has not seen since Jan. missed appointment with her  admits to noncompliance with insulin  meds: basaglar 15 units QHS, humalog 5 units before meals (but has only been taking it 1-2x daily though has 2-3 meals a day)  needs to go back to see eye doctor     PAST MEDICAL & SURGICAL HISTORY:  Diabetes, type I  Recovering alcoholic  No significant past surgical history      SH: past heavy EtOH use. lives with family    FAMILY HISTORY:  see above      Allergies    penicillin (Hives)    Intolerances        REVIEW OF SYSTEMS:    CONSTITUTIONAL: No fever, weight loss, or fatigue  EYES: No eye pain, visual disturbances, or discharge  ENMT:  No difficulty hearing, tinnitus, vertigo; No sinus or throat pain  NECK: No pain or stiffness  RESPIRATORY: No cough, wheezing, chills or hemoptysis; No shortness of breath  CARDIOVASCULAR: No chest pain, palpitations, dizziness, or leg swelling  GASTROINTESTINAL: No abdominal or epigastric pain. No nausea, vomiting, or hematemesis; No diarrhea or constipation. No melena or hematochezia.  NEUROLOGICAL: No headaches, memory loss, loss of strength, numbness, or tremors  SKIN: No itching, burning, rashes, or lesions   MUSCULOSKELETAL: No joint pain or swelling; No muscle, back, or extremity pain  PSYCHIATRIC: No depression, anxiety, mood swings, or difficulty sleeping        MEDICATIONS  (STANDING):  dextrose 5%. 1000 milliLiter(s) (50 mL/Hr) IV Continuous <Continuous>  dextrose 50% Injectable 12.5 Gram(s) IV Push once  dextrose 50% Injectable 25 Gram(s) IV Push once  dextrose 50% Injectable 25 Gram(s) IV Push once  famotidine    Tablet 20 milliGRAM(s) Oral daily  insulin glargine Injectable (LANTUS) 20 Unit(s) SubCutaneous every morning  insulin lispro (HumaLOG) corrective regimen sliding scale   SubCutaneous three times a day before meals  insulin lispro Injectable (HumaLOG) 3 Unit(s) SubCutaneous before breakfast  insulin lispro Injectable (HumaLOG) 3 Unit(s) SubCutaneous before lunch  insulin lispro Injectable (HumaLOG) 3 Unit(s) SubCutaneous before dinner  multiple electrolytes Injection Type 1 1000 milliLiter(s) (100 mL/Hr) IV Continuous <Continuous>  nicotine -   7 mG/24Hr(s) Patch 1 patch Transdermal daily    MEDICATIONS  (PRN):  dextrose 40% Gel 15 Gram(s) Oral once PRN Blood Glucose LESS THAN 70 milliGRAM(s)/deciliter  glucagon  Injectable 1 milliGRAM(s) IntraMuscular once PRN Glucose LESS THAN 70 milligrams/deciliter      Vital Signs Last 24 Hrs  T(C): 36.7 (31 May 2019 09:58), Max: 37 (30 May 2019 22:30)  T(F): 98 (31 May 2019 09:58), Max: 98.6 (30 May 2019 22:30)  HR: 79 (31 May 2019 09:58) (67 - 87)  BP: 109/55 (31 May 2019 09:58) (104/68 - 123/72)  BP(mean): --  RR: 18 (31 May 2019 09:58) (15 - 20)  SpO2: 100% (31 May 2019 09:58) (98% - 100%)      PHYSICAL EXAM:    Constitutional: NAD, thin  HEENT: EOMI, no exophalmos  Neck: trachea midline, no thyroid enlargement  Respiratory: CTAB  Cardiovascular: S1 and S2, RRR  Gastrointestinal: BS+, soft, ntnd  Extremities: No peripheral edema  Neurological: A/O x 3, no focal deficits  Psychiatric: Normal mood, normal affect  Skin: No rashes, no acanthosis        LABS  05-31    137  |  104  |  6.0<L>  ----------------------------<  147<H>  3.9   |  22.0  |  0.51    Ca    8.5<L>      31 May 2019 11:51  Phos  3.5     05-31  Mg     2.0     05-31    TPro  5.7<L>  /  Alb  3.7  /  TBili  <0.2<L>  /  DBili  x   /  AST  17  /  ALT  10  /  AlkPhos  83  05-31                          13.2   9.8   )-----------( 199      ( 31 May 2019 11:51 )             38.3           Hemoglobin A1C, Whole Blood: 14.8 % (05-31 @ 04:29)    Ketone - Urine: Large (05-30 @ 14:24)    Alkaline Phosphatase, Serum: 83 U/L (05-31-19 @ 11:51)  Alanine Aminotransferase (ALT/SGPT): 10 U/L (05-31-19 @ 11:51)  Aspartate Aminotransferase (AST/SGOT): 17 U/L (05-31-19 @ 11:51)  Albumin, Serum: 3.7 g/dL (05-31-19 @ 11:51)      CAPILLARY BLOOD GLUCOSE      POCT Blood Glucose.: 124 mg/dL (31 May 2019 12:00)  POCT Blood Glucose.: 214 mg/dL (31 May 2019 09:20)  POCT Blood Glucose.: 219 mg/dL (31 May 2019 07:01)  POCT Blood Glucose.: 90 mg/dL (31 May 2019 06:16)  POCT Blood Glucose.: 103 mg/dL (31 May 2019 05:24)  POCT Blood Glucose.: 158 mg/dL (31 May 2019 04:14)  POCT Blood Glucose.: 144 mg/dL (31 May 2019 02:54)  POCT Blood Glucose.: 188 mg/dL (31 May 2019 01:36)  POCT Blood Glucose.: 199 mg/dL (31 May 2019 00:32)  POCT Blood Glucose.: 223 mg/dL (30 May 2019 23:38)  POCT Blood Glucose.: 215 mg/dL (30 May 2019 22:29)  POCT Blood Glucose.: 183 mg/dL (30 May 2019 21:29)  POCT Blood Glucose.: 206 mg/dL (30 May 2019 20:16)  POCT Blood Glucose.: 177 mg/dL (30 May 2019 19:06)  POCT Blood Glucose.: 170 mg/dL (30 May 2019 18:02)  POCT Blood Glucose.: 192 mg/dL (30 May 2019 16:59)  POCT Blood Glucose.: 269 mg/dL (30 May 2019 15:58)  POCT Blood Glucose.: 288 mg/dL (30 May 2019 13:22)

## 2019-05-31 NOTE — PROVIDER CONTACT NOTE (EICU) - SITUATION
23 year old male w/ PMH of DMI and ETOH abuse presented w/ chest discomfort found to have DKA secondary to non-compliance. Pt also admits to recent tick bite.

## 2019-05-31 NOTE — PROVIDER CONTACT NOTE (EICU) - SITUATION
Chart review revealed hypokalemia as such replaced in accordance w/ hospital electrolyte repletion policy.

## 2019-05-31 NOTE — PROGRESS NOTE ADULT - PROBLEM SELECTOR PLAN 1
Gap now closed, Insulin infusion off, started on lantus w/ pre-meal coverage w/ Lispro and SS. Endocrine consulted. Cont Accucheck monitoring. Repeat BMP q4h. IVF for dehydration. Gap now closed, Insulin infusion off, started on lantus w/ pre-meal coverage w/ Lispro and SS. Endocrine consulted. Cont Accucheck monitoring. Repeat BMP q6h. replete electrolytes as needed. IVF for dehydration.

## 2019-05-31 NOTE — ED ADULT NURSE REASSESSMENT NOTE - NS ED NURSE REASSESS COMMENT FT1
Assumed care of patient at 1920, alert and oriented x4. Resting comfortably in stretcher at this time. Denies pain, no s/s of distress noted. Awaiting bed assignment at this time. Safety maintained. IVs patent. Insulin GTT infusing per order.
Assumed pt care at 0720 from PURA Kebede. pt status unchanged, refer to flowsheet and chart, pt safety maintained, pt hemodynamically stable, currently off insulin drip. Refer to provider documentation, will continue to monitor pt blood sugar. Pt reeducated on lifetime DM treatment. Pending official downgrade of care. Awaiting bed and transfer upstairs.
Cesar Montemayor made aware of lastest accucheck and insulin drip decreased to 7units/ hr
Eicu NEIDA Montemayor made aware of accucheck of 188 patient to be maintained at 7 units/hr
Eicu kim Montemayor called and insulin drip dropped to 5 units/hour and gave results of accucheck of 144
Repeat , EICU PA called and made aware. IVF stopped per order. Nicotine patch noted to right arm. Documented in EMR to right ankle. Patch removed, PA made aware and will order new patch per policy. Report given to PURA Urbina.
Repeat . EICU NEIDA Montemayor called and made aware. As per PA, Give Lantus now, continue insulin GTT at 5units per hour for 2 hours then discontinue. Rn verified orders and verbalized understanding. Pt reports he is feeling well, no complaints at this time. Safety maintained. Labs drawn by phlebotomist.
Repeat . EICU Niels Montemayor called and made aware. Insulin GTT increased to 8units per hour per PA. Pt resting comfortably, safety maintained at this time. No s/s of distress noted.
Repeat FS 90. CONCHA Montemayor called and made aware. As per PA, give orange juice and food to patient. Meal provided per PA order. Pt resting comfortably, denies any complaints. Peaked T waves noted to monitor. Niels montemayor made aware, no further interventions needed at this time per PA. Will repeat FS at 7am per PA.  K infusing per order. Will continue to monitor.
hourly [id number]
repeat , EICU NEIDA Montemayor called and made aware. Insulin GTT stopped per PA order. As per PA keep IVF infusing, check FS in 1 hour and call PA back with results.
rpt accuchecl 206 patient maintained on insulin drip at 5 units per hour. patient deneis any pain or discomfort at present time..Boluses of plasmalyte infusing patient remains on moitor in NSR will monitor and chart changes
Assumed pt care at this time. Pt is A+Ox4, in no apparent distress. Aware of plan of care, VSS. IV patent. NSR on cardiac monitor. Safety measures in place. Will continue to closely monitor. Report given to PURA Schmitt for room [id number]. Transport in at this time.

## 2019-05-31 NOTE — CONSULT NOTE ADULT - PROBLEM SELECTOR RECOMMENDATION 9
resolved DKA and dehydration. continue IVF. lantus, pre meal insulin plus RISS. insulin therapy protocol. endocrinology consult. patient may benefit from insulin pump

## 2019-05-31 NOTE — PROGRESS NOTE ADULT - ASSESSMENT
Pt is a 22 yo M w/ PMHx of DM1 and alcoholism (pt states not drinking alcohol x last 15 months) admitted to MICU for DKA. Treated with Insulin infusion/IVF. Gap is now closed, started on Lantus/Lispro pre meal coverage w/ SS. Pt has no complaints at this time. Pt has been optimized for downgrade.

## 2019-05-31 NOTE — PROGRESS NOTE ADULT - SUBJECTIVE AND OBJECTIVE BOX
Patient is a 23y old  Male who presents with a chief complaint of     BRIEF HOSPITAL COURSE: Pt is a 22 yo M w/ PMHx of DM1 and alcoholism (pt states not drinking alcohol x last 15 months) who came to the ER co chest tightness since yesterday at 10:00 AM. Pt states that drinking water has helped him with his symptoms. He went to Emergency Care yesterday morning trying to get a prescription for his short acting insuline (Humalog) but they were unable to provide the prescription and he was advised to visit his primary care doctor. Pt states he ran out of Humalog 2 days ago and missed his last 2 doses.  Pt also mentioned pulling a tick from his R pelvic area 2 days ago. He recalls being in the woods this week but is not sure for how long that tick was in his skin. Pt also endorses palpitations, mild HA, dry mouth, nauseas and polyuria.  Denies cp, sob, pain, fever, chills, current n/v/d or any other complaints at this time.    Events last 24 hours: Pt was admitted to ICU yesterday, started on Insulin infusion with agressive IVF. Pt's AG was 16 at 10pm last night (), repeat labs showed AG at 11 430 this morning (). Insulin infusion is now off, started on Lantus and pre-meal coverage/SS. Pt has no complaints at this time. Pt has been optimized for downgrade.     PAST MEDICAL & SURGICAL HISTORY:  Diabetes, type I  Recovering alcoholic  No significant past surgical history    Review of Systems:  CONSTITUTIONAL: No fever, chills, or fatigue  EYES: No eye pain, visual disturbances, or discharge  ENMT:  No difficulty hearing, tinnitus, vertigo; No sinus or throat pain  NECK: No pain or stiffness  RESPIRATORY: No cough, wheezing, chills or hemoptysis; No shortness of breath  CARDIOVASCULAR: No chest pain, palpitations, dizziness, or leg swelling  GASTROINTESTINAL: No abdominal or epigastric pain. No nausea, vomiting, or hematemesis; No diarrhea or constipation. No melena or hematochezia.  GENITOURINARY: No dysuria, frequency, hematuria, or incontinence  NEUROLOGICAL: No headaches, memory loss, loss of strength, numbness, or tremors  SKIN: No itching, burning, rashes, or lesions   MUSCULOSKELETAL: No joint pain or swelling; No muscle, back, or extremity pain  PSYCHIATRIC: No depression, anxiety, mood swings, or difficulty sleeping    Medications:  dextrose 40% Gel 15 Gram(s) Oral once PRN  dextrose 50% Injectable 12.5 Gram(s) IV Push once  dextrose 50% Injectable 25 Gram(s) IV Push once  dextrose 50% Injectable 25 Gram(s) IV Push once  glucagon  Injectable 1 milliGRAM(s) IntraMuscular once PRN  insulin glargine Injectable (LANTUS) 20 Unit(s) SubCutaneous every morning  insulin lispro (HumaLOG) corrective regimen sliding scale   SubCutaneous three times a day before meals  insulin lispro Injectable (HumaLOG) 3 Unit(s) SubCutaneous before breakfast  insulin lispro Injectable (HumaLOG) 3 Unit(s) SubCutaneous before lunch  insulin lispro Injectable (HumaLOG) 3 Unit(s) SubCutaneous before dinner  dextrose 5%. 1000 milliLiter(s) IV Continuous <Continuous>  potassium chloride  10 mEq/100 mL IVPB 10 milliEquivalent(s) IV Intermittent every 1 hour  nicotine -   7 mG/24Hr(s) Patch 1 patch Transdermal daily    ICU Vital Signs Last 24 Hrs  T(C): 36.5 (31 May 2019 06:26), Max: 37 (30 May 2019 22:30)  T(F): 97.7 (31 May 2019 06:26), Max: 98.6 (30 May 2019 22:30)  HR: 85 (31 May 2019 07:25) (67 - 105)  BP: 118/73 (31 May 2019 07:25) (104/68 - 123/72)  BP(mean): --  ABP: --  ABP(mean): --  RR: 18 (31 May 2019 07:25) (15 - 20)  SpO2: 100% (31 May 2019 07:25) (96% - 100%)      LABS:                        12.7   12.2  )-----------( 194      ( 31 May 2019 04:28 )             35.2     05-31    138  |  108<H>  |  5.0<L>  ----------------------------<  142<H>  3.0<L>   |  19.0<L>  |  0.45<L>    Ca    7.5<L>      31 May 2019 04:28  Phos  3.5     05-31  Mg     2.0     05-31      CAPILLARY BLOOD GLUCOSE  POCT Blood Glucose.: 219 mg/dL (31 May 2019 07:01)    Urinalysis Basic - ( 30 May 2019 14:24 )  Color: Yellow / Appearance: Clear / S.025 / pH: x  Gluc: x / Ketone: Large  / Bili: Negative / Urobili: Negative mg/dL   Blood: x / Protein: 100 mg/dL / Nitrite: Negative   Leuk Esterase: Negative / RBC: Negative /HPF / WBC 0-2   Sq Epi: x / Non Sq Epi: Occasional / Bacteria: Negative    CULTURES:    Physical Examination:    General: No acute distress.  Alert, oriented, interactive, nonfocal, lying comfortably in stretcher, sleeping    HEENT: Pupils equal, reactive to light.  Symmetric.    PULM: Clear to auscultation bilaterally, no significant sputum production    CVS: Regular rate and rhythm, no murmurs, rubs, or gallops    ABD: Soft, nondistended, nontender, normoactive bowel sounds, no masses    EXT: No edema, nontender    SKIN: Warm and well perfused, no rashes noted.    RADIOLOGY: < from: Xray Chest 1 View AP/PA. (19 @ 16:31) >  FINDINGS:    Single frontal view of the chest demonstrates the lungs to be clear. The   cardiomediastinal silhouette is normal. No acute osseous abnormalities.   Overlying EKG leads and wires are noted    IMPRESSION: No acute cardiopulmonary disease process.    CRITICAL CARE TIME SPENT: ***

## 2019-06-01 ENCOUNTER — TRANSCRIPTION ENCOUNTER (OUTPATIENT)
Age: 24
End: 2019-06-01

## 2019-06-01 VITALS
SYSTOLIC BLOOD PRESSURE: 110 MMHG | RESPIRATION RATE: 18 BRPM | TEMPERATURE: 99 F | HEART RATE: 76 BPM | DIASTOLIC BLOOD PRESSURE: 76 MMHG | OXYGEN SATURATION: 97 %

## 2019-06-01 LAB
B BURGDOR C6 AB SER-ACNC: NEGATIVE — SIGNIFICANT CHANGE UP
B BURGDOR C6 AB SER-ACNC: NEGATIVE — SIGNIFICANT CHANGE UP
B BURGDOR IGG+IGM SER-ACNC: 0.05 INDEX — SIGNIFICANT CHANGE UP (ref 0.01–0.89)
B BURGDOR IGG+IGM SER-ACNC: 0.06 INDEX — SIGNIFICANT CHANGE UP (ref 0.01–0.89)
GLUCOSE BLDC GLUCOMTR-MCNC: 144 MG/DL — HIGH (ref 70–99)
GLUCOSE BLDC GLUCOMTR-MCNC: 264 MG/DL — HIGH (ref 70–99)
LYME C6 AB IGG/IGM EIA REFLEX WESTERN BL: SIGNIFICANT CHANGE UP

## 2019-06-01 PROCEDURE — 93005 ELECTROCARDIOGRAM TRACING: CPT

## 2019-06-01 PROCEDURE — 85027 COMPLETE CBC AUTOMATED: CPT

## 2019-06-01 PROCEDURE — 82009 KETONE BODYS QUAL: CPT

## 2019-06-01 PROCEDURE — 82947 ASSAY GLUCOSE BLOOD QUANT: CPT

## 2019-06-01 PROCEDURE — 82435 ASSAY OF BLOOD CHLORIDE: CPT

## 2019-06-01 PROCEDURE — 84132 ASSAY OF SERUM POTASSIUM: CPT

## 2019-06-01 PROCEDURE — 80053 COMPREHEN METABOLIC PANEL: CPT

## 2019-06-01 PROCEDURE — 84100 ASSAY OF PHOSPHORUS: CPT

## 2019-06-01 PROCEDURE — 71045 X-RAY EXAM CHEST 1 VIEW: CPT

## 2019-06-01 PROCEDURE — 84466 ASSAY OF TRANSFERRIN: CPT

## 2019-06-01 PROCEDURE — 83036 HEMOGLOBIN GLYCOSYLATED A1C: CPT

## 2019-06-01 PROCEDURE — 82803 BLOOD GASES ANY COMBINATION: CPT

## 2019-06-01 PROCEDURE — 82330 ASSAY OF CALCIUM: CPT

## 2019-06-01 PROCEDURE — 83735 ASSAY OF MAGNESIUM: CPT

## 2019-06-01 PROCEDURE — 83605 ASSAY OF LACTIC ACID: CPT

## 2019-06-01 PROCEDURE — 36415 COLL VENOUS BLD VENIPUNCTURE: CPT

## 2019-06-01 PROCEDURE — 96375 TX/PRO/DX INJ NEW DRUG ADDON: CPT

## 2019-06-01 PROCEDURE — 85014 HEMATOCRIT: CPT

## 2019-06-01 PROCEDURE — 99285 EMERGENCY DEPT VISIT HI MDM: CPT | Mod: 25

## 2019-06-01 PROCEDURE — 80307 DRUG TEST PRSMV CHEM ANLYZR: CPT

## 2019-06-01 PROCEDURE — 82728 ASSAY OF FERRITIN: CPT

## 2019-06-01 PROCEDURE — 82962 GLUCOSE BLOOD TEST: CPT

## 2019-06-01 PROCEDURE — 96376 TX/PRO/DX INJ SAME DRUG ADON: CPT

## 2019-06-01 PROCEDURE — 86618 LYME DISEASE ANTIBODY: CPT

## 2019-06-01 PROCEDURE — 81001 URINALYSIS AUTO W/SCOPE: CPT

## 2019-06-01 PROCEDURE — 96374 THER/PROPH/DIAG INJ IV PUSH: CPT

## 2019-06-01 PROCEDURE — 80048 BASIC METABOLIC PNL TOTAL CA: CPT

## 2019-06-01 PROCEDURE — 83550 IRON BINDING TEST: CPT

## 2019-06-01 PROCEDURE — 84295 ASSAY OF SERUM SODIUM: CPT

## 2019-06-01 PROCEDURE — 99239 HOSP IP/OBS DSCHRG MGMT >30: CPT

## 2019-06-01 PROCEDURE — 82010 KETONE BODYS QUAN: CPT

## 2019-06-01 PROCEDURE — 83540 ASSAY OF IRON: CPT

## 2019-06-01 RX ORDER — INSULIN LISPRO 100/ML
5 VIAL (ML) SUBCUTANEOUS
Qty: 2 | Refills: 1
Start: 2019-06-01

## 2019-06-01 RX ORDER — INSULIN GLARGINE 100 [IU]/ML
20 INJECTION, SOLUTION SUBCUTANEOUS
Qty: 2 | Refills: 1
Start: 2019-06-01

## 2019-06-01 RX ORDER — INSULIN LISPRO 100/ML
0 VIAL (ML) SUBCUTANEOUS
Qty: 0 | Refills: 0 | DISCHARGE
Start: 2019-06-01

## 2019-06-01 RX ORDER — INSULIN LISPRO 100/ML
5 VIAL (ML) SUBCUTANEOUS
Qty: 3 | Refills: 0
Start: 2019-06-01

## 2019-06-01 RX ADMIN — Medication 6: at 12:50

## 2019-06-01 RX ADMIN — Medication 3 UNIT(S): at 08:21

## 2019-06-01 RX ADMIN — Medication 1 PATCH: at 12:51

## 2019-06-01 RX ADMIN — INSULIN GLARGINE 20 UNIT(S): 100 INJECTION, SOLUTION SUBCUTANEOUS at 08:33

## 2019-06-01 RX ADMIN — Medication 3 UNIT(S): at 12:50

## 2019-06-01 RX ADMIN — SODIUM CHLORIDE 100 MILLILITER(S): 9 INJECTION, SOLUTION INTRAVENOUS at 08:34

## 2019-06-01 RX ADMIN — FAMOTIDINE 20 MILLIGRAM(S): 10 INJECTION INTRAVENOUS at 12:51

## 2019-06-01 NOTE — DISCHARGE NOTE NURSING/CASE MANAGEMENT/SOCIAL WORK - NSDCPEWEB_GEN_ALL_CORE
[employer] for Tobacco Control website --- [email or web address] website --- [email or web address]

## 2019-06-01 NOTE — DISCHARGE NOTE PROVIDER - HOSPITAL COURSE
23M w/ PMHx of T1DM and alcoholism (pt states not drinking alcohol x last 15 months) who came to the ER co 1day of chest tightness. Ran out of Humalog but was advised by ER to see PMD. patient also removed a tick from his R pelvic area.      diagnosed with diabetes in 2016    follows with Dr. Wallace in the clinic but has not seen since Jan. missed appointment with her    admits to noncompliance with insulin    meds: basaglar 15 units QHS, humalog 5 units before meals (but has only been taking it 1-2x daily though has 2-3 meals a day)    needs to go back to see eye doctor     now well controlled and agreeable with plan of humalog 5 tid + sliding scale coverage along with basiglar 20 units QD and to f/u Dr Wallace. Compliance stressed. lives with  parents and mother is DM too.        Vital Signs Last 24 Hrs    T(C): 36.9 (06-01-19 @ 08:31), Max: 36.9 (05-31-19 @ 16:37)    T(F): 98.4 (06-01-19 @ 08:31), Max: 98.5 (05-31-19 @ 16:37)    HR: 71 (06-01-19 @ 08:31) (66 - 78)    BP: 112/70 (06-01-19 @ 08:31) (102/62 - 112/70)    BP(mean): --    RR: 18 (06-01-19 @ 08:31) (18 - 18)    SpO2: 100% (06-01-19 @ 08:31) (100% - 100%)    Constitutional: NAD, thin    HEENT: EOMI, no exophalmos    Neck: trachea midline, no thyroid enlargement    Respiratory: CTAB    Cardiovascular: S1 and S2, RRR    Gastrointestinal: BS+, soft, ntnd    Extremities: No peripheral edema    Neurological: A/O x 3, no focal deficits    Psychiatric: Normal mood, normal affect    Skin: No rashes, no acanthosis        TIME 41 mins

## 2019-06-01 NOTE — DISCHARGE NOTE PROVIDER - NSDCCPCAREPLAN_GEN_ALL_CORE_FT
PRINCIPAL DISCHARGE DIAGNOSIS  Diagnosis: Diabetic ketoacidosis without coma associated with type 1 diabetes mellitus  Assessment and Plan of Treatment:       SECONDARY DISCHARGE DIAGNOSES  Diagnosis: Medication refill  Assessment and Plan of Treatment:

## 2019-06-01 NOTE — DISCHARGE NOTE NURSING/CASE MANAGEMENT/SOCIAL WORK - NSDCPEEMAIL_GEN_ALL_CORE
[facility] for Tobacco Control email [email or web address]

## 2019-06-01 NOTE — DISCHARGE NOTE PROVIDER - CARE PROVIDER_API CALL
Alie Wallace (DO)  EndocrinologyMetabDiabetes; Internal Medicine  [address] [place] [address]  Phone: [phone]  Fax: [phone]  Follow Up Time:

## 2019-06-01 NOTE — DISCHARGE NOTE NURSING/CASE MANAGEMENT/SOCIAL WORK - NSDCDPATPORTLINK_GEN_ALL_CORE
You can access the [facility] Patient Portal, offered by [facility], by registering with the following website: [email or web address]

## 2019-06-14 NOTE — ED PROVIDER NOTE - PHYSICAL EXAMINATION
Remove packing from wound on Sunday then may shower as normal.  You do not need to repack but cover the area as needed. We will be reaching out to you to schedule your surgery.
hypothermia , kussmauls breathing , cachectic

## 2019-06-21 ENCOUNTER — RESULT CHARGE (OUTPATIENT)
Age: 24
End: 2019-06-21

## 2019-06-21 ENCOUNTER — APPOINTMENT (OUTPATIENT)
Dept: ENDOCRINOLOGY | Facility: CLINIC | Age: 24
End: 2019-06-21
Payer: MEDICAID

## 2019-06-21 VITALS
BODY MASS INDEX: 17.47 KG/M2 | DIASTOLIC BLOOD PRESSURE: 70 MMHG | HEART RATE: 87 BPM | HEIGHT: 70 IN | WEIGHT: 122 LBS | SYSTOLIC BLOOD PRESSURE: 110 MMHG

## 2019-06-21 DIAGNOSIS — Z83.3 FAMILY HISTORY OF DIABETES MELLITUS: ICD-10-CM

## 2019-06-21 PROCEDURE — 82962 GLUCOSE BLOOD TEST: CPT

## 2019-06-21 PROCEDURE — 99214 OFFICE O/P EST MOD 30 MIN: CPT | Mod: 25

## 2019-06-21 RX ORDER — INSULIN LISPRO 100 [IU]/ML
100 INJECTION, SOLUTION INTRAVENOUS; SUBCUTANEOUS
Qty: 4 | Refills: 0 | Status: DISCONTINUED | COMMUNITY
End: 2019-06-21

## 2019-06-28 LAB — GLUCOSE BLDC GLUCOMTR-MCNC: 201

## 2019-06-28 NOTE — REVIEW OF SYSTEMS
[Recent Weight Gain (___ Lbs)] : recent [unfilled] ~Ulb weight gain [Polyuria] : polyuria [Blurry Vision] : no blurred vision [Chest Pain] : no chest pain [Palpitations] : no palpitations [Shortness Of Breath] : no shortness of breath [SOB on Exertion] : no shortness of breath during exertion [Vomiting] : no vomiting was observed [Nausea] : no nausea [Pain/Numbness of Digits] : no pain/numbness of digits [Abdominal Pain] : no abdominal pain [Nocturia] : no nocturia [Anxiety] : no anxiety [Depression] : no depression [Polydipsia] : no polydipsia [FreeTextEntry2] : abelinoh gain 5-10 pounds since DC from hospital

## 2019-06-28 NOTE — HISTORY OF PRESENT ILLNESS
[FreeTextEntry1] : Quality: Type 1\par Severity: severe, uncontrolled\par Duration/Onset: 2018 with A1c >16.9% and DKA\par \par ASSOCIATED SYMPTOMS/COMPLICATIONS: multiple admissions for DKA since diagnosis due to nonadherence with insulin. recent hospitalized May-June 2019 for DKA\par \par MODIFYING FACTORS:  nonadherent with follow up visits and ran out meds\par Diet: eats 3 meals daily, snack in between meals. not carb counting.\par Exercise: work outside ([employer])\par Current DM meds: Basaglar 20 units at 9 pm. Admelog 5 units + scale (1 units for every 50 above 150)\par SMBG - no meter/logs.  testing 2-4x daily.  per pt FS in past 2 weeks - -200's. 1-2 episodes 300's. lowest FS 68 at noon (1 episode)\par

## 2019-06-28 NOTE — ASSESSMENT
[FreeTextEntry1] : 1. uncontrolled T1DM\par - discussed important of adherence with insulin and f/u visits\par - cont current insulin regimen\par - test 4x daily and record numbers on logs and RTO for review with CDE\par - repeat labs 3 months\par - pt to call insurance regarding coverage for CGM ([employer], [place])\par - risks of hypoglycemia and hyperglycemia discussed, advised that basal insulin should always be taken and missing basal insulin doses can result in DKA\par - close follow up need to help improve adherence with insulin

## 2019-06-28 NOTE — PHYSICAL EXAM
[Alert] : alert [No Acute Distress] : no acute distress [Well Nourished] : well nourished [Normal Sclera/Conjunctiva] : normal sclera/conjunctiva [Well Developed] : well developed [EOMI] : extra ocular movement intact [No Thyroid Nodules] : there were no palpable thyroid nodules [Thyroid Not Enlarged] : the thyroid was not enlarged [Clear to Auscultation] : lungs were clear to auscultation bilaterally [Normal Rate] : heart rate was normal  [Normal Rate and Effort] : normal respiratory rhythm and effort [Normal Bowel Sounds] : normal bowel sounds [Normal S1, S2] : normal S1 and S2 [Not Tender] : non-tender [Soft] : abdomen soft [Not Distended] : not distended [Left Foot Was Examined] : left foot ~C was examined [Right Foot Was Examined] : right foot ~C was examined [2+] : 2+ in the dorsalis pedis [Cranial Nerves Intact] : cranial nerves 2-12 were intact [No Tremors] : no tremors [Normal Reflexes] : deep tendon reflexes were 2+ and symmetric [Normal Insight/Judgement] : insight and judgment were intact [Oriented x3] : oriented to person, place, and time [Normal Mood] : the mood was normal [Normal Affect] : the affect was normal [Vibration Dec.] : normal vibratory sensation at the level of the toes [Position Sense Dec.] : normal position sense at the level of the toes [Diminished Throughout Both Feet] : normal tactile sensation with monofilament testing throughout both feet

## 2019-08-09 ENCOUNTER — RX RENEWAL (OUTPATIENT)
Age: 24
End: 2019-08-09

## 2019-08-22 ENCOUNTER — APPOINTMENT (OUTPATIENT)
Dept: ENDOCRINOLOGY | Facility: CLINIC | Age: 24
End: 2019-08-22

## 2019-09-06 NOTE — ED ADULT NURSE NOTE - NSFALLRSKHARMRISK_ED_ALL_ED
Patient with complaints of right ear pain since yesterday. Mother states that she out salt water in right ear this am and patient reported burning.
no

## 2019-12-02 ENCOUNTER — APPOINTMENT (OUTPATIENT)
Dept: ENDOCRINOLOGY | Facility: CLINIC | Age: 24
End: 2019-12-02

## 2019-12-09 NOTE — PROGRESS NOTE ADULT - ATTENDING COMMENTS
What Type Of Note Output Would You Prefer (Optional)?: Standard Output
How Severe Is Your Skin Lesion?: mild
Has Your Skin Lesion Been Treated?: not been treated
Is This A New Presentation, Or A Follow-Up?: Skin Lesions
Pt seen and examined by [facility] PA, I have seen and evaluated this patient independently and agree with the above findings: Pt admitted overnight for DKA, type I DM due to noncompliance AG closed transitioned to long acting, pt no longer requires ICU LOC.

## 2020-01-02 ENCOUNTER — INPATIENT (INPATIENT)
Facility: HOSPITAL | Age: 25
LOS: 4 days | Discharge: HOME | DRG: 637 | End: 2020-01-07
Attending: INTERNAL MEDICINE | Admitting: HOSPITALIST
Payer: MEDICAID

## 2020-01-02 VITALS — HEART RATE: 129 BPM | OXYGEN SATURATION: 99 %

## 2020-01-02 DIAGNOSIS — E11.10 TYPE 2 DIABETES MELLITUS WITH KETOACIDOSIS WITHOUT COMA: ICD-10-CM

## 2020-01-02 LAB
ALBUMIN SERPL ELPH-MCNC: 4.9 G/DL — SIGNIFICANT CHANGE UP (ref 3.3–5.2)
ALP SERPL-CCNC: 251 U/L — HIGH (ref 40–120)
ALT FLD-CCNC: 19 U/L — SIGNIFICANT CHANGE UP
AMPHET UR-MCNC: NEGATIVE — SIGNIFICANT CHANGE UP
ANION GAP SERPL CALC-SCNC: >29 MMOL/L — HIGH (ref 5–17)
ANION GAP SERPL CALC-SCNC: >35 MMOL/L — HIGH (ref 5–17)
APPEARANCE UR: CLEAR — SIGNIFICANT CHANGE UP
AST SERPL-CCNC: 31 U/L — SIGNIFICANT CHANGE UP
B PERT DNA SPEC QL NAA+PROBE: SIGNIFICANT CHANGE UP
BARBITURATES UR SCN-MCNC: NEGATIVE — SIGNIFICANT CHANGE UP
BENZODIAZ UR-MCNC: NEGATIVE — SIGNIFICANT CHANGE UP
BILIRUB SERPL-MCNC: <0.2 MG/DL — LOW (ref 0.4–2)
BILIRUB UR-MCNC: NEGATIVE — SIGNIFICANT CHANGE UP
BUN SERPL-MCNC: 18 MG/DL — SIGNIFICANT CHANGE UP (ref 8–20)
BUN SERPL-MCNC: 20 MG/DL — SIGNIFICANT CHANGE UP (ref 8–20)
C PNEUM DNA SPEC QL NAA+PROBE: SIGNIFICANT CHANGE UP
CALCIUM SERPL-MCNC: 10.1 MG/DL — SIGNIFICANT CHANGE UP (ref 8.6–10.2)
CALCIUM SERPL-MCNC: 8.6 MG/DL — SIGNIFICANT CHANGE UP (ref 8.6–10.2)
CHLORIDE SERPL-SCNC: 87 MMOL/L — LOW (ref 98–107)
CHLORIDE SERPL-SCNC: 99 MMOL/L — SIGNIFICANT CHANGE UP (ref 98–107)
CO2 SERPL-SCNC: <6 MMOL/L — CRITICAL LOW (ref 22–29)
CO2 SERPL-SCNC: <6 MMOL/L — CRITICAL LOW (ref 22–29)
COCAINE METAB.OTHER UR-MCNC: NEGATIVE — SIGNIFICANT CHANGE UP
COLOR SPEC: YELLOW — SIGNIFICANT CHANGE UP
CREAT SERPL-MCNC: 0.87 MG/DL — SIGNIFICANT CHANGE UP (ref 0.5–1.3)
CREAT SERPL-MCNC: 1.1 MG/DL — SIGNIFICANT CHANGE UP (ref 0.5–1.3)
DIFF PNL FLD: ABNORMAL
FLUAV H1 2009 PAND RNA SPEC QL NAA+PROBE: SIGNIFICANT CHANGE UP
FLUAV H1 RNA SPEC QL NAA+PROBE: SIGNIFICANT CHANGE UP
FLUAV H3 RNA SPEC QL NAA+PROBE: SIGNIFICANT CHANGE UP
FLUAV SUBTYP SPEC NAA+PROBE: SIGNIFICANT CHANGE UP
FLUBV RNA SPEC QL NAA+PROBE: DETECTED
GAS PNL BLDA: SIGNIFICANT CHANGE UP
GAS PNL BLDV: SIGNIFICANT CHANGE UP
GLUCOSE BLDC GLUCOMTR-MCNC: 162 MG/DL — HIGH (ref 70–99)
GLUCOSE BLDC GLUCOMTR-MCNC: 165 MG/DL — HIGH (ref 70–99)
GLUCOSE BLDC GLUCOMTR-MCNC: 197 MG/DL — HIGH (ref 70–99)
GLUCOSE BLDC GLUCOMTR-MCNC: 259 MG/DL — HIGH (ref 70–99)
GLUCOSE BLDC GLUCOMTR-MCNC: 264 MG/DL — HIGH (ref 70–99)
GLUCOSE SERPL-MCNC: 355 MG/DL — HIGH (ref 70–115)
GLUCOSE SERPL-MCNC: 542 MG/DL — CRITICAL HIGH (ref 70–115)
GLUCOSE UR QL: 1000 MG/DL
HADV DNA SPEC QL NAA+PROBE: SIGNIFICANT CHANGE UP
HCOV PNL SPEC NAA+PROBE: SIGNIFICANT CHANGE UP
HCT VFR BLD CALC: 46.2 % — SIGNIFICANT CHANGE UP (ref 39–50)
HGB BLD-MCNC: 13.7 G/DL — SIGNIFICANT CHANGE UP (ref 13–17)
HMPV RNA SPEC QL NAA+PROBE: SIGNIFICANT CHANGE UP
HPIV1 RNA SPEC QL NAA+PROBE: SIGNIFICANT CHANGE UP
HPIV2 RNA SPEC QL NAA+PROBE: SIGNIFICANT CHANGE UP
HPIV3 RNA SPEC QL NAA+PROBE: SIGNIFICANT CHANGE UP
HPIV4 RNA SPEC QL NAA+PROBE: SIGNIFICANT CHANGE UP
KETONES UR-MCNC: ABNORMAL
LEUKOCYTE ESTERASE UR-ACNC: NEGATIVE — SIGNIFICANT CHANGE UP
LIDOCAIN IGE QN: 11 U/L — LOW (ref 22–51)
LYMPHOCYTES # BLD AUTO: 11 % — LOW (ref 13–44)
MAGNESIUM SERPL-MCNC: 2.7 MG/DL — HIGH (ref 1.8–2.6)
MCHC RBC-ENTMCNC: 29.7 GM/DL — LOW (ref 32–36)
MCHC RBC-ENTMCNC: 33.7 PG — SIGNIFICANT CHANGE UP (ref 27–34)
MCV RBC AUTO: 113.8 FL — HIGH (ref 80–100)
METHADONE UR-MCNC: NEGATIVE — SIGNIFICANT CHANGE UP
MONOCYTES NFR BLD AUTO: 8 % — SIGNIFICANT CHANGE UP (ref 2–14)
NEUTROPHILS NFR BLD AUTO: 66 % — SIGNIFICANT CHANGE UP (ref 43–77)
NITRITE UR-MCNC: NEGATIVE — SIGNIFICANT CHANGE UP
OPIATES UR-MCNC: NEGATIVE — SIGNIFICANT CHANGE UP
PCP SPEC-MCNC: SIGNIFICANT CHANGE UP
PCP UR-MCNC: NEGATIVE — SIGNIFICANT CHANGE UP
PH UR: 5 — SIGNIFICANT CHANGE UP (ref 5–8)
PHOSPHATE SERPL-MCNC: 6.8 MG/DL — HIGH (ref 2.4–4.7)
PLATELET # BLD AUTO: 393 K/UL — SIGNIFICANT CHANGE UP (ref 150–400)
POTASSIUM SERPL-MCNC: 5 MMOL/L — SIGNIFICANT CHANGE UP (ref 3.5–5.3)
POTASSIUM SERPL-MCNC: 6.7 MMOL/L — CRITICAL HIGH (ref 3.5–5.3)
POTASSIUM SERPL-SCNC: 5 MMOL/L — SIGNIFICANT CHANGE UP (ref 3.5–5.3)
POTASSIUM SERPL-SCNC: 6.7 MMOL/L — CRITICAL HIGH (ref 3.5–5.3)
PROCALCITONIN SERPL-MCNC: 0.42 NG/ML — HIGH (ref 0.02–0.1)
PROT SERPL-MCNC: 8.7 G/DL — SIGNIFICANT CHANGE UP (ref 6.6–8.7)
PROT UR-MCNC: 100 MG/DL
RAPID RVP RESULT: DETECTED
RBC # BLD: 4.06 M/UL — LOW (ref 4.2–5.8)
RBC # FLD: 11.5 % — SIGNIFICANT CHANGE UP (ref 10.3–14.5)
RV+EV RNA SPEC QL NAA+PROBE: SIGNIFICANT CHANGE UP
SODIUM SERPL-SCNC: 128 MMOL/L — LOW (ref 135–145)
SODIUM SERPL-SCNC: 134 MMOL/L — LOW (ref 135–145)
SP GR SPEC: 1.02 — SIGNIFICANT CHANGE UP (ref 1.01–1.02)
THC UR QL: POSITIVE
UROBILINOGEN FLD QL: NEGATIVE MG/DL — SIGNIFICANT CHANGE UP
WBC # BLD: 40.29 K/UL — CRITICAL HIGH (ref 3.8–10.5)
WBC # FLD AUTO: 40.29 K/UL — CRITICAL HIGH (ref 3.8–10.5)

## 2020-01-02 PROCEDURE — 93010 ELECTROCARDIOGRAM REPORT: CPT

## 2020-01-02 PROCEDURE — 99221 1ST HOSP IP/OBS SF/LOW 40: CPT

## 2020-01-02 PROCEDURE — 71045 X-RAY EXAM CHEST 1 VIEW: CPT | Mod: 26

## 2020-01-02 PROCEDURE — 99291 CRITICAL CARE FIRST HOUR: CPT

## 2020-01-02 RX ORDER — SODIUM CHLORIDE 9 MG/ML
3000 INJECTION INTRAMUSCULAR; INTRAVENOUS; SUBCUTANEOUS ONCE
Refills: 0 | Status: COMPLETED | OUTPATIENT
Start: 2020-01-02 | End: 2020-01-02

## 2020-01-02 RX ORDER — THIAMINE MONONITRATE (VIT B1) 100 MG
100 TABLET ORAL DAILY
Refills: 0 | Status: DISCONTINUED | OUTPATIENT
Start: 2020-01-02 | End: 2020-01-07

## 2020-01-02 RX ORDER — FOLIC ACID 0.8 MG
1 TABLET ORAL DAILY
Refills: 0 | Status: DISCONTINUED | OUTPATIENT
Start: 2020-01-02 | End: 2020-01-07

## 2020-01-02 RX ORDER — SODIUM BICARBONATE 1 MEQ/ML
50 SYRINGE (ML) INTRAVENOUS ONCE
Refills: 0 | Status: DISCONTINUED | OUTPATIENT
Start: 2020-01-02 | End: 2020-01-02

## 2020-01-02 RX ORDER — SODIUM CHLORIDE 9 MG/ML
1000 INJECTION, SOLUTION INTRAVENOUS ONCE
Refills: 0 | Status: COMPLETED | OUTPATIENT
Start: 2020-01-02 | End: 2020-01-02

## 2020-01-02 RX ORDER — AZTREONAM 2 G
1000 VIAL (EA) INJECTION ONCE
Refills: 0 | Status: COMPLETED | OUTPATIENT
Start: 2020-01-02 | End: 2020-01-02

## 2020-01-02 RX ORDER — CHLORHEXIDINE GLUCONATE 213 G/1000ML
1 SOLUTION TOPICAL
Refills: 0 | Status: DISCONTINUED | OUTPATIENT
Start: 2020-01-02 | End: 2020-01-05

## 2020-01-02 RX ORDER — SODIUM CHLORIDE 9 MG/ML
1000 INJECTION, SOLUTION INTRAVENOUS
Refills: 0 | Status: DISCONTINUED | OUTPATIENT
Start: 2020-01-02 | End: 2020-01-03

## 2020-01-02 RX ORDER — HEPARIN SODIUM 5000 [USP'U]/ML
5000 INJECTION INTRAVENOUS; SUBCUTANEOUS EVERY 8 HOURS
Refills: 0 | Status: DISCONTINUED | OUTPATIENT
Start: 2020-01-02 | End: 2020-01-03

## 2020-01-02 RX ORDER — SODIUM CHLORIDE 9 MG/ML
1000 INJECTION, SOLUTION INTRAVENOUS
Refills: 0 | Status: DISCONTINUED | OUTPATIENT
Start: 2020-01-02 | End: 2020-01-02

## 2020-01-02 RX ORDER — INSULIN HUMAN 100 [IU]/ML
6 INJECTION, SOLUTION SUBCUTANEOUS
Qty: 100 | Refills: 0 | Status: DISCONTINUED | OUTPATIENT
Start: 2020-01-02 | End: 2020-01-03

## 2020-01-02 RX ORDER — SODIUM BICARBONATE 1 MEQ/ML
100 SYRINGE (ML) INTRAVENOUS ONCE
Refills: 0 | Status: COMPLETED | OUTPATIENT
Start: 2020-01-02 | End: 2020-01-02

## 2020-01-02 RX ORDER — INSULIN HUMAN 100 [IU]/ML
10 INJECTION, SOLUTION SUBCUTANEOUS ONCE
Refills: 0 | Status: COMPLETED | OUTPATIENT
Start: 2020-01-02 | End: 2020-01-02

## 2020-01-02 RX ADMIN — INSULIN HUMAN 10 UNIT(S): 100 INJECTION, SOLUTION SUBCUTANEOUS at 17:30

## 2020-01-02 RX ADMIN — Medication 50 MILLIGRAM(S): at 18:11

## 2020-01-02 RX ADMIN — SODIUM CHLORIDE 6000 MILLILITER(S): 9 INJECTION INTRAMUSCULAR; INTRAVENOUS; SUBCUTANEOUS at 17:30

## 2020-01-02 RX ADMIN — HEPARIN SODIUM 5000 UNIT(S): 5000 INJECTION INTRAVENOUS; SUBCUTANEOUS at 22:53

## 2020-01-02 RX ADMIN — SODIUM CHLORIDE 2000 MILLILITER(S): 9 INJECTION, SOLUTION INTRAVENOUS at 18:10

## 2020-01-02 RX ADMIN — SODIUM CHLORIDE 3000 MILLILITER(S): 9 INJECTION INTRAMUSCULAR; INTRAVENOUS; SUBCUTANEOUS at 18:00

## 2020-01-02 RX ADMIN — SODIUM CHLORIDE 200 MILLILITER(S): 9 INJECTION, SOLUTION INTRAVENOUS at 19:52

## 2020-01-02 RX ADMIN — INSULIN HUMAN 6 UNIT(S)/HR: 100 INJECTION, SOLUTION SUBCUTANEOUS at 19:18

## 2020-01-02 RX ADMIN — Medication 75 MILLIGRAM(S): at 22:53

## 2020-01-02 NOTE — ED PROVIDER NOTE - OBJECTIVE STATEMENT
24M hx of DM I , former alcoholism (now recovered x 1.5 years), presenting w/ generalized malaise x 2-3 days, increased work of breathing, altered mental status, poor po intake. Per family at bedside, they found pt completely 'out of it' today, in bed, pt did not have any syncopal event or reported seizure like activity. Pt states he has not been able to take his insulin in a few days. Unable to tell the date, or describe recent events.

## 2020-01-02 NOTE — ED ADULT NURSE NOTE - NSIMPLEMENTINTERV_GEN_ALL_ED
Implemented All Fall Risk Interventions:  Rober[place] to call system. Call bell, personal items and telephone within reach. Instruct patient to call for assistance. Room bathroom lighting operational. Non-slip footwear when patient is off stretcher. Physically safe environment: no spills, clutter or unnecessary equipment. Stretcher in lowest position, wheels locked, appropriate side rails in place. Provide visual cue, wrist band, yellow gown, etc. Monitor gait and stability. Monitor for mental status changes and reorient to person, place, and time. Review medications for side effects contributing to fall risk. Reinforce activity limits and safety measures with patient and family.

## 2020-01-02 NOTE — H&P ADULT - HISTORY OF PRESENT ILLNESS
23 y/o male with pmhx of DM I, etoh abuse (last drink 2 days ago, drinks 3-4 beers daily, was sober x 1.5 years until recently presents with nausea/vomiting x 1 day and SOB found to be in severe DKA with AG of > 35, , pH 6.85. Patient states that he has been noncompliant on his meds. Has also been around his two children who have been sick with viral illnesses. Denies chest pain, abdominal pain. Afebrile with WBC 40.

## 2020-01-02 NOTE — ED ADULT NURSE REASSESSMENT NOTE - NS ED NURSE REASSESS COMMENT FT1
pt bib parent, per pt mother historian states "girlfriend told me he wasn't feeling well and we went to check on him and found him like this, disoriented, out of it , totally slumped over "  pt disoriented upon arrival, confused, aggitated, diaphoretic &  pale  pt mother states pt is "recovering alcoholic and a diabetic"  finger stick elevated md at bedside, iv/fluids initiated upon arrival labs sent  on tele monitors pt bib parent, per pt mother historian states "girlfriend told me he wasn't feeling well and we went to check on him and found him like this, disoriented, out of it , totally slumped over "  pt disoriented upon arrival, confused, agitated, diaphoretic &  pale  pt mother states pt is "recovering alcoholic and a diabetic"  finger stick elevated md at bedside, iv/fluids initiated upon arrival labs sent; per md verbal 10unit insulin R given IVP  on tele monitors ; will continue to monitor  pending further orders ; icu consult

## 2020-01-02 NOTE — ED PROVIDER NOTE - CLINICAL SUMMARY MEDICAL DECISION MAKING FREE TEXT BOX
Hubert PGY3: 24M p/w AMS, kussmauls breathing, concern for DKA, apparent peaked T waves on ekg, empiric dosage of 10u insulin IV given, labs demonstrate significant acidemia and leukocytosis, will cover for possible infectious precipitant. MICU eval. Hubert PGY3: 24M p/w AMS, Kussmaul's breathing, concern for DKA, apparent peaked T waves on ekg, empiric dosage of 10u insulin IV given, labs demonstrate significant acidemia and leukocytosis, will cover for possible infectious precipitant. MICU eval.

## 2020-01-02 NOTE — H&P ADULT - ATTENDING COMMENTS
24M w/ PMHx T1DM, EtOH abuse admitted w/ DKA sec to medication non-compliance. AG >35, CO2 < 6 w/ pH 6.85. Started on aggressive hydration and IV insulin gtt. Bicarb 100 mEq pushed. Pan culture and empiric Abc cover.

## 2020-01-02 NOTE — H&P ADULT - ASSESSMENT
23 y/o male with pmhx of DM I, etoh abuse now with dehydration malnourishment, and severe DKA secondary to medication noncompliance vs infectious etiology with elevated WBC.     Admit to MICU.      NEURO: MV, thiamine, folic acid, and social work consult for etoh abuse. serum etoh level pending. utox pending.   CVS: BP stable.  PULM: severe metabolic acidosis. kussmaul breathing to compensate. aggressive hydration and insulin therapy will place on bipap if worsens.  GI: check amylase/lipase.  RENAL: NEIL Cr elevated to 1.1 from baseline 0.48. monitor urine output and lytes. hyperkalemia but receiving continuous insulin. K of 6.7 drawn prior to insulin. repeating bmp now. trend BMP q 4-6 hours while on insulin drip.  ID: cultures pending. RVP pending. UA and CXR pending. check procalcitonin.   ENDO: received 10 units regular insulin. follow with insulin drip at 7 units/hr based on 0.1 units/kg. q 1 hour accuchecks. aggressive IVF hydration at 225 cc/hr. when glucose < 250 switch to dextrose containing fluids. bridge to long actin insulin when AG 12. check hgbA1C. endo and diabetes specialist consult.   HEME: heparin for dvt prophylaxis    Discussed with Dr. Nelson 23 y/o male with pmhx of DM I, etoh abuse now with dehydration malnourishment, and severe DKA secondary to medication noncompliance vs infectious etiology with elevated WBC.     Admit to MICU.      NEURO: MV, thiamine, folic acid, and social work consult for etoh abuse. serum etoh level pending. utox pending.   CVS: BP stable.  PULM: severe metabolic acidosis. kussmaul breathing to compensate. aggressive hydration and insulin therapy will place on bipap if worsens.  GI: check amylase/lipase.  RENAL: NEIL Cr elevated to 1.1 from baseline 0.48. monitor urine output and lytes. hyperkalemia but receiving continuous insulin. K of 6.7 drawn prior to insulin. repeating bmp now. trend BMP q 4-6 hours while on insulin drip.  ID: cultures pending. RVP pending. UA and CXR pending. check procalcitonin.   ENDO: received 10 units regular insulin. follow with insulin drip at 7 units/hr based on 0.1 units/kg. q 1 hour accuchecks. aggressive IVF hydration at 225 cc/hr. when glucose < 250 switch to dextrose containing fluids. bridge to long actin insulin when AG 12. check hgbA1C. endo and diabetes specialist consult.   HEME: heparin for dvt prophylaxis    Discussed with Dr. Nelson      38 minutes of critical care time spent providing medical care for patient's acute illness/conditions that impairs at least one vital organ system and/or poses a high risk of imminent or life threatening deterioration in the patient's condition. It includes time spent evaluating and treating the patient's acute illness as well as time spent reviewing labs, radiology, discussing goals of care with patient and/or patient's family, and discussing the case with a multidisciplinary team in an effort to prevent further life threatening deterioration or end organ damage. This time is independent of any procedures performed. 25 y/o male with pmhx of DM I, etoh abuse now with dehydration malnourishment, and severe DKA secondary to medication noncompliance vs infectious etiology with elevated WBC.     Admit to MICU.      NEURO: MV, thiamine, folic acid, and social work consult for etoh abuse. serum etoh level pending. utox pending.   CVS: BP stable. sinus tachycardia due to dehydration  PULM: severe metabolic acidosis. kussmaul breathing to compensate. aggressive hydration and insulin therapy will place on bipap if worsens.  GI: check amylase/lipase.  RENAL: NEIL Cr elevated to 1.1 from baseline 0.48. monitor urine output and lytes. hyperkalemia but receiving continuous insulin. K of 6.7 drawn prior to insulin. repeating bmp now. trend BMP q 4-6 hours while on insulin drip.  ID: cultures pending. RVP pending. UA and CXR pending. check procalcitonin.   ENDO: received 10 units regular insulin. follow with insulin drip at 7 units/hr based on 0.1 units/kg. q 1 hour accuchecks. aggressive IVF hydration at 225 cc/hr. when glucose < 250 switch to dextrose containing fluids. bridge to long actin insulin when AG 12. check hgbA1C. endo and diabetes specialist consult.   HEME: heparin for dvt prophylaxis    Discussed with Dr. Nelson      38 minutes of critical care time spent providing medical care for patient's acute illness/conditions that impairs at least one vital organ system and/or poses a high risk of imminent or life threatening deterioration in the patient's condition. It includes time spent evaluating and treating the patient's acute illness as well as time spent reviewing labs, radiology, discussing goals of care with patient and/or patient's family, and discussing the case with a multidisciplinary team in an effort to prevent further life threatening deterioration or end organ damage. This time is independent of any procedures performed.

## 2020-01-02 NOTE — ED ADULT TRIAGE NOTE - CHIEF COMPLAINT QUOTE
Patient arrived to ED today with c/o hyperglycemia, patient had a syncopal episode also.  Patient tachypneic and tachypnea.

## 2020-01-02 NOTE — ED PROVIDER NOTE - PROGRESS NOTE DETAILS
Hubert PGY3: micu evaluated pt, accepted, would like to hold off on any bicarb, hyperkalemia noted but pt already received insulin 10 IV on initial arrival, will rpt labs now, will cover for underlying infection. Gaspar PGY3: pt reassessed, improved but still tachycardic, mentation better but still tachypneic

## 2020-01-02 NOTE — ED PROVIDER NOTE - ATTENDING CONTRIBUTION TO CARE
I personally saw the patient with the resident, and completed the key components of the history and physical exam. I then discussed the management plan with the resident.      23 yo wm pmh iddm, alcohol abuse brought to ed for ams , decreased po intake and noncompliant with insulin; as per family pt with multiple admissions to icu for similar symptoms; pt with difficulty answering questions;  pe thin cachetic male heent poor dentition, mucosa dry neck supple chest clear  abd soft  neuro moves all extremities;  dx hyperglycemia; ; dka; labs , iv fluids, insulin;

## 2020-01-02 NOTE — ED ADULT NURSE NOTE - OBJECTIVE STATEMENT
Patient found by girlfriend "unresponsive" per family at bedside.  Patient arrives with Patient found by girlfriend "unresponsive in bed" per family at bedside.  Patient arrives with kussmaul respirations, tachycardic to 130s, saturating well on room air. No signs of trauma, patient with no complaints of pain, patient with dry mucous membranes, pale. skin is dry.  Patient is unsure of when he last ate/drank or took insulin, lungs are clear.

## 2020-01-02 NOTE — H&P ADULT - NSHPPHYSICALEXAM_GEN_ALL_CORE
General: kussmaul breathing. lethargic but interactive and answers questions appropriately.  Head: Normocephalic, atraumatic.   EENT: Sclera white. PERRL, EOM intact. Secretions normal. no cervical lymphadenopathy. Poor dentition.  PULM: Breath sounds clear to auscultation symmetrically throughout all lung fields. No wheezing, rhonchi, or rales.   CVS: Regular rate and rhythm. No murmurs or rubs. Pulses 2+ on upper and lower extremities bilaterally.   GI: nondistended with bowel sounds normoactive in all four quadrants. No tenderness to light or deep palpation.   Neuro: Strength 5/5 in upper and lower extremities. Responsive to painful stimuli. Pronator drift negative. Patient able to accurately move finger to nose. Equal sensation to light stimuli over the trigeminal distribution as well as the upper and lower extremity. PERRL.  Skin: No lesions, rashes, ulcers. Extremities equally warm bilaterally.

## 2020-01-03 LAB
ALBUMIN SERPL ELPH-MCNC: 3.4 G/DL — SIGNIFICANT CHANGE UP (ref 3.3–5.2)
ALP SERPL-CCNC: 118 U/L — SIGNIFICANT CHANGE UP (ref 40–120)
ALT FLD-CCNC: 12 U/L — SIGNIFICANT CHANGE UP
ANION GAP SERPL CALC-SCNC: 12 MMOL/L — SIGNIFICANT CHANGE UP (ref 5–17)
ANION GAP SERPL CALC-SCNC: 17 MMOL/L — SIGNIFICANT CHANGE UP (ref 5–17)
ANION GAP SERPL CALC-SCNC: 20 MMOL/L — HIGH (ref 5–17)
AST SERPL-CCNC: 25 U/L — SIGNIFICANT CHANGE UP
BASOPHILS # BLD AUTO: 0 K/UL — SIGNIFICANT CHANGE UP (ref 0–0.2)
BASOPHILS NFR BLD AUTO: 0 % — SIGNIFICANT CHANGE UP (ref 0–2)
BILIRUB SERPL-MCNC: <0.2 MG/DL — LOW (ref 0.4–2)
BUN SERPL-MCNC: 11 MG/DL — SIGNIFICANT CHANGE UP (ref 8–20)
BUN SERPL-MCNC: 14 MG/DL — SIGNIFICANT CHANGE UP (ref 8–20)
BUN SERPL-MCNC: 7 MG/DL — LOW (ref 8–20)
CALCIUM SERPL-MCNC: 8.5 MG/DL — LOW (ref 8.6–10.2)
CALCIUM SERPL-MCNC: 8.8 MG/DL — SIGNIFICANT CHANGE UP (ref 8.6–10.2)
CALCIUM SERPL-MCNC: 9 MG/DL — SIGNIFICANT CHANGE UP (ref 8.6–10.2)
CHLORIDE SERPL-SCNC: 103 MMOL/L — SIGNIFICANT CHANGE UP (ref 98–107)
CHLORIDE SERPL-SCNC: 107 MMOL/L — SIGNIFICANT CHANGE UP (ref 98–107)
CHLORIDE SERPL-SCNC: 98 MMOL/L — SIGNIFICANT CHANGE UP (ref 98–107)
CO2 SERPL-SCNC: 10 MMOL/L — CRITICAL LOW (ref 22–29)
CO2 SERPL-SCNC: 16 MMOL/L — LOW (ref 22–29)
CO2 SERPL-SCNC: 20 MMOL/L — LOW (ref 22–29)
CREAT SERPL-MCNC: 0.4 MG/DL — LOW (ref 0.5–1.3)
CREAT SERPL-MCNC: 0.44 MG/DL — LOW (ref 0.5–1.3)
CREAT SERPL-MCNC: 0.63 MG/DL — SIGNIFICANT CHANGE UP (ref 0.5–1.3)
EOSINOPHIL # BLD AUTO: 0 K/UL — SIGNIFICANT CHANGE UP (ref 0–0.5)
EOSINOPHIL NFR BLD AUTO: 0 % — SIGNIFICANT CHANGE UP (ref 0–6)
GIANT PLATELETS BLD QL SMEAR: PRESENT — SIGNIFICANT CHANGE UP
GLUCOSE BLDC GLUCOMTR-MCNC: 113 MG/DL — HIGH (ref 70–99)
GLUCOSE BLDC GLUCOMTR-MCNC: 115 MG/DL — HIGH (ref 70–99)
GLUCOSE BLDC GLUCOMTR-MCNC: 138 MG/DL — HIGH (ref 70–99)
GLUCOSE BLDC GLUCOMTR-MCNC: 143 MG/DL — HIGH (ref 70–99)
GLUCOSE BLDC GLUCOMTR-MCNC: 144 MG/DL — HIGH (ref 70–99)
GLUCOSE BLDC GLUCOMTR-MCNC: 152 MG/DL — HIGH (ref 70–99)
GLUCOSE BLDC GLUCOMTR-MCNC: 156 MG/DL — HIGH (ref 70–99)
GLUCOSE BLDC GLUCOMTR-MCNC: 158 MG/DL — HIGH (ref 70–99)
GLUCOSE BLDC GLUCOMTR-MCNC: 161 MG/DL — HIGH (ref 70–99)
GLUCOSE BLDC GLUCOMTR-MCNC: 161 MG/DL — HIGH (ref 70–99)
GLUCOSE BLDC GLUCOMTR-MCNC: 162 MG/DL — HIGH (ref 70–99)
GLUCOSE BLDC GLUCOMTR-MCNC: 196 MG/DL — HIGH (ref 70–99)
GLUCOSE BLDC GLUCOMTR-MCNC: 258 MG/DL — HIGH (ref 70–99)
GLUCOSE BLDC GLUCOMTR-MCNC: 88 MG/DL — SIGNIFICANT CHANGE UP (ref 70–99)
GLUCOSE BLDC GLUCOMTR-MCNC: 97 MG/DL — SIGNIFICANT CHANGE UP (ref 70–99)
GLUCOSE SERPL-MCNC: 104 MG/DL — SIGNIFICANT CHANGE UP (ref 70–115)
GLUCOSE SERPL-MCNC: 174 MG/DL — HIGH (ref 70–115)
GLUCOSE SERPL-MCNC: 179 MG/DL — HIGH (ref 70–115)
HBA1C BLD-MCNC: 13.6 % — HIGH (ref 4–5.6)
HCT VFR BLD CALC: 30.2 % — LOW (ref 39–50)
HGB BLD-MCNC: 10.5 G/DL — LOW (ref 13–17)
LYMPHOCYTES # BLD AUTO: 0.41 K/UL — LOW (ref 1–3.3)
LYMPHOCYTES # BLD AUTO: 2.6 % — LOW (ref 13–44)
MAGNESIUM SERPL-MCNC: 1.5 MG/DL — LOW (ref 1.8–2.6)
MAGNESIUM SERPL-MCNC: 1.7 MG/DL — SIGNIFICANT CHANGE UP (ref 1.6–2.6)
MAGNESIUM SERPL-MCNC: 2.2 MG/DL — SIGNIFICANT CHANGE UP (ref 1.6–2.6)
MANUAL SMEAR VERIFICATION: SIGNIFICANT CHANGE UP
MCHC RBC-ENTMCNC: 33.7 PG — SIGNIFICANT CHANGE UP (ref 27–34)
MCHC RBC-ENTMCNC: 34.8 GM/DL — SIGNIFICANT CHANGE UP (ref 32–36)
MCV RBC AUTO: 96.8 FL — SIGNIFICANT CHANGE UP (ref 80–100)
MONOCYTES # BLD AUTO: 0.56 K/UL — SIGNIFICANT CHANGE UP (ref 0–0.9)
MONOCYTES NFR BLD AUTO: 3.5 % — SIGNIFICANT CHANGE UP (ref 2–14)
NEUTROPHILS # BLD AUTO: 14.89 K/UL — HIGH (ref 1.8–7.4)
NEUTROPHILS NFR BLD AUTO: 89.6 % — HIGH (ref 43–77)
NEUTS BAND # BLD: 4.3 % — SIGNIFICANT CHANGE UP (ref 0–8)
PHOSPHATE SERPL-MCNC: 1.7 MG/DL — LOW (ref 2.4–4.7)
PHOSPHATE SERPL-MCNC: 2.3 MG/DL — LOW (ref 2.4–4.7)
PHOSPHATE SERPL-MCNC: 2.6 MG/DL — SIGNIFICANT CHANGE UP (ref 2.4–4.7)
PLAT MORPH BLD: NORMAL — SIGNIFICANT CHANGE UP
PLATELET # BLD AUTO: 172 K/UL — SIGNIFICANT CHANGE UP (ref 150–400)
POTASSIUM SERPL-MCNC: 3.2 MMOL/L — LOW (ref 3.5–5.3)
POTASSIUM SERPL-MCNC: 3.2 MMOL/L — LOW (ref 3.5–5.3)
POTASSIUM SERPL-MCNC: 4 MMOL/L — SIGNIFICANT CHANGE UP (ref 3.5–5.3)
POTASSIUM SERPL-SCNC: 3.2 MMOL/L — LOW (ref 3.5–5.3)
POTASSIUM SERPL-SCNC: 3.2 MMOL/L — LOW (ref 3.5–5.3)
POTASSIUM SERPL-SCNC: 4 MMOL/L — SIGNIFICANT CHANGE UP (ref 3.5–5.3)
PROT SERPL-MCNC: 6 G/DL — LOW (ref 6.6–8.7)
RBC # BLD: 3.12 M/UL — LOW (ref 4.2–5.8)
RBC # FLD: 11.1 % — SIGNIFICANT CHANGE UP (ref 10.3–14.5)
RBC BLD AUTO: NORMAL — SIGNIFICANT CHANGE UP
SODIUM SERPL-SCNC: 130 MMOL/L — LOW (ref 135–145)
SODIUM SERPL-SCNC: 136 MMOL/L — SIGNIFICANT CHANGE UP (ref 135–145)
SODIUM SERPL-SCNC: 137 MMOL/L — SIGNIFICANT CHANGE UP (ref 135–145)
WBC # BLD: 15.86 K/UL — HIGH (ref 3.8–10.5)
WBC # FLD AUTO: 15.86 K/UL — HIGH (ref 3.8–10.5)

## 2020-01-03 PROCEDURE — 99222 1ST HOSP IP/OBS MODERATE 55: CPT

## 2020-01-03 PROCEDURE — 99223 1ST HOSP IP/OBS HIGH 75: CPT

## 2020-01-03 PROCEDURE — 99291 CRITICAL CARE FIRST HOUR: CPT

## 2020-01-03 RX ORDER — MAGNESIUM SULFATE 500 MG/ML
2 VIAL (ML) INJECTION
Refills: 0 | Status: COMPLETED | OUTPATIENT
Start: 2020-01-03 | End: 2020-01-03

## 2020-01-03 RX ORDER — SODIUM CHLORIDE 9 MG/ML
1000 INJECTION, SOLUTION INTRAVENOUS
Refills: 0 | Status: DISCONTINUED | OUTPATIENT
Start: 2020-01-03 | End: 2020-01-07

## 2020-01-03 RX ORDER — POTASSIUM CHLORIDE 20 MEQ
10 PACKET (EA) ORAL
Refills: 0 | Status: COMPLETED | OUTPATIENT
Start: 2020-01-03 | End: 2020-01-03

## 2020-01-03 RX ORDER — GLUCAGON INJECTION, SOLUTION 0.5 MG/.1ML
1 INJECTION, SOLUTION SUBCUTANEOUS ONCE
Refills: 0 | Status: DISCONTINUED | OUTPATIENT
Start: 2020-01-03 | End: 2020-01-07

## 2020-01-03 RX ORDER — POTASSIUM PHOSPHATE, MONOBASIC POTASSIUM PHOSPHATE, DIBASIC 236; 224 MG/ML; MG/ML
15 INJECTION, SOLUTION INTRAVENOUS ONCE
Refills: 0 | Status: COMPLETED | OUTPATIENT
Start: 2020-01-03 | End: 2020-01-03

## 2020-01-03 RX ORDER — BENZOCAINE AND MENTHOL 5; 1 G/100ML; G/100ML
1 LIQUID ORAL
Refills: 0 | Status: DISCONTINUED | OUTPATIENT
Start: 2020-01-03 | End: 2020-01-07

## 2020-01-03 RX ORDER — INSULIN LISPRO 100/ML
VIAL (ML) SUBCUTANEOUS
Refills: 0 | Status: DISCONTINUED | OUTPATIENT
Start: 2020-01-03 | End: 2020-01-07

## 2020-01-03 RX ORDER — DEXTROSE 50 % IN WATER 50 %
25 SYRINGE (ML) INTRAVENOUS ONCE
Refills: 0 | Status: DISCONTINUED | OUTPATIENT
Start: 2020-01-03 | End: 2020-01-07

## 2020-01-03 RX ORDER — INSULIN GLARGINE 100 [IU]/ML
20 INJECTION, SOLUTION SUBCUTANEOUS EVERY MORNING
Refills: 0 | Status: DISCONTINUED | OUTPATIENT
Start: 2020-01-04 | End: 2020-01-06

## 2020-01-03 RX ORDER — INSULIN GLARGINE 100 [IU]/ML
20 INJECTION, SOLUTION SUBCUTANEOUS ONCE
Refills: 0 | Status: COMPLETED | OUTPATIENT
Start: 2020-01-03 | End: 2020-01-03

## 2020-01-03 RX ORDER — POTASSIUM CHLORIDE 20 MEQ
40 PACKET (EA) ORAL ONCE
Refills: 0 | Status: COMPLETED | OUTPATIENT
Start: 2020-01-03 | End: 2020-01-03

## 2020-01-03 RX ORDER — POTASSIUM PHOSPHATE, MONOBASIC POTASSIUM PHOSPHATE, DIBASIC 236; 224 MG/ML; MG/ML
30 INJECTION, SOLUTION INTRAVENOUS ONCE
Refills: 0 | Status: COMPLETED | OUTPATIENT
Start: 2020-01-03 | End: 2020-01-03

## 2020-01-03 RX ORDER — DEXTROSE 50 % IN WATER 50 %
15 SYRINGE (ML) INTRAVENOUS ONCE
Refills: 0 | Status: DISCONTINUED | OUTPATIENT
Start: 2020-01-03 | End: 2020-01-07

## 2020-01-03 RX ORDER — DEXTROSE 50 % IN WATER 50 %
12.5 SYRINGE (ML) INTRAVENOUS ONCE
Refills: 0 | Status: DISCONTINUED | OUTPATIENT
Start: 2020-01-03 | End: 2020-01-07

## 2020-01-03 RX ORDER — ACETAMINOPHEN 500 MG
650 TABLET ORAL ONCE
Refills: 0 | Status: COMPLETED | OUTPATIENT
Start: 2020-01-03 | End: 2020-01-03

## 2020-01-03 RX ORDER — POTASSIUM CHLORIDE 20 MEQ
40 PACKET (EA) ORAL EVERY 4 HOURS
Refills: 0 | Status: COMPLETED | OUTPATIENT
Start: 2020-01-03 | End: 2020-01-03

## 2020-01-03 RX ORDER — INSULIN LISPRO 100/ML
3 VIAL (ML) SUBCUTANEOUS
Refills: 0 | Status: DISCONTINUED | OUTPATIENT
Start: 2020-01-03 | End: 2020-01-05

## 2020-01-03 RX ADMIN — Medication 100 MILLIEQUIVALENT(S): at 18:20

## 2020-01-03 RX ADMIN — Medication 100 MILLIEQUIVALENT(S): at 09:15

## 2020-01-03 RX ADMIN — BENZOCAINE AND MENTHOL 1 LOZENGE: 5; 1 LIQUID ORAL at 05:42

## 2020-01-03 RX ADMIN — Medication 3 UNIT(S): at 21:22

## 2020-01-03 RX ADMIN — POTASSIUM PHOSPHATE, MONOBASIC POTASSIUM PHOSPHATE, DIBASIC 83.33 MILLIMOLE(S): 236; 224 INJECTION, SOLUTION INTRAVENOUS at 05:41

## 2020-01-03 RX ADMIN — Medication 1 TABLET(S): at 11:07

## 2020-01-03 RX ADMIN — Medication 100 MILLIEQUIVALENT(S): at 07:45

## 2020-01-03 RX ADMIN — SODIUM CHLORIDE 200 MILLILITER(S): 9 INJECTION, SOLUTION INTRAVENOUS at 00:54

## 2020-01-03 RX ADMIN — Medication 2: at 17:37

## 2020-01-03 RX ADMIN — Medication 650 MILLIGRAM(S): at 11:35

## 2020-01-03 RX ADMIN — Medication 75 MILLIGRAM(S): at 09:15

## 2020-01-03 RX ADMIN — HEPARIN SODIUM 5000 UNIT(S): 5000 INJECTION INTRAVENOUS; SUBCUTANEOUS at 05:47

## 2020-01-03 RX ADMIN — Medication 50 GRAM(S): at 09:14

## 2020-01-03 RX ADMIN — BENZOCAINE AND MENTHOL 1 LOZENGE: 5; 1 LIQUID ORAL at 11:07

## 2020-01-03 RX ADMIN — BENZOCAINE AND MENTHOL 1 LOZENGE: 5; 1 LIQUID ORAL at 09:15

## 2020-01-03 RX ADMIN — POTASSIUM PHOSPHATE, MONOBASIC POTASSIUM PHOSPHATE, DIBASIC 62.5 MILLIMOLE(S): 236; 224 INJECTION, SOLUTION INTRAVENOUS at 08:06

## 2020-01-03 RX ADMIN — Medication 75 MILLIGRAM(S): at 21:21

## 2020-01-03 RX ADMIN — BENZOCAINE AND MENTHOL 1 LOZENGE: 5; 1 LIQUID ORAL at 07:46

## 2020-01-03 RX ADMIN — Medication 100 MILLIEQUIVALENT(S): at 20:00

## 2020-01-03 RX ADMIN — Medication 1 MILLIGRAM(S): at 11:07

## 2020-01-03 RX ADMIN — Medication 40 MILLIEQUIVALENT(S): at 16:53

## 2020-01-03 RX ADMIN — BENZOCAINE AND MENTHOL 1 LOZENGE: 5; 1 LIQUID ORAL at 21:21

## 2020-01-03 RX ADMIN — INSULIN GLARGINE 20 UNIT(S): 100 INJECTION, SOLUTION SUBCUTANEOUS at 14:36

## 2020-01-03 RX ADMIN — HEPARIN SODIUM 5000 UNIT(S): 5000 INJECTION INTRAVENOUS; SUBCUTANEOUS at 13:53

## 2020-01-03 RX ADMIN — BENZOCAINE AND MENTHOL 1 LOZENGE: 5; 1 LIQUID ORAL at 17:36

## 2020-01-03 RX ADMIN — CHLORHEXIDINE GLUCONATE 1 APPLICATION(S): 213 SOLUTION TOPICAL at 05:47

## 2020-01-03 RX ADMIN — Medication 50 GRAM(S): at 07:45

## 2020-01-03 RX ADMIN — Medication 40 MILLIEQUIVALENT(S): at 21:22

## 2020-01-03 RX ADMIN — Medication 3 UNIT(S): at 17:37

## 2020-01-03 RX ADMIN — Medication 40 MILLIEQUIVALENT(S): at 07:45

## 2020-01-03 RX ADMIN — Medication 100 MILLIGRAM(S): at 11:07

## 2020-01-03 RX ADMIN — Medication 100 MILLIEQUIVALENT(S): at 16:52

## 2020-01-03 RX ADMIN — Medication 650 MILLIGRAM(S): at 12:05

## 2020-01-03 RX ADMIN — Medication 6: at 21:22

## 2020-01-03 NOTE — ADVANCED PRACTICE NURSE CONSULT - ASSESSMENT
went to see pt in am pt is a+ox3 c/o 0 pain flat affect. pt states he is not working right now, he checks bg sometime he takes lantus 30 units qhs last time he took it was a few days prior to admission. humalog he takes 5 units +ss but he is not consistent w it. he is drinking alcohol again and would like to decrease the consumption but declined help. he has a glucometer @ home. pt agreed to schedule his fu appointment w dr lafleur today

## 2020-01-03 NOTE — DIETITIAN INITIAL EVALUATION ADULT. - ADD RECOMMEND
Continue MVI, thiamine, and folic acid supplementation. Obtain daily weights to monitor trends. RD to follow up.

## 2020-01-03 NOTE — DIETITIAN INITIAL EVALUATION ADULT. - PERTINENT MEDS FT
MEDICATIONS  (STANDING):  benzocaine 15 mG/menthol 3.6 mG (Sugar-Free) Lozenge 1 Lozenge Oral every 2 hours  chlorhexidine 2% Cloths 1 Application(s) Topical <User Schedule>  dextrose 5%. 1000 milliLiter(s) (50 mL/Hr) IV Continuous <Continuous>  dextrose 50% Injectable 12.5 Gram(s) IV Push once  dextrose 50% Injectable 25 Gram(s) IV Push once  dextrose 50% Injectable 25 Gram(s) IV Push once  folic acid 1 milliGRAM(s) Oral daily  heparin  Injectable 5000 Unit(s) SubCutaneous every 8 hours  insulin glargine Injectable (LANTUS) 20 Unit(s) SubCutaneous once  insulin lispro (HumaLOG) corrective regimen sliding scale   SubCutaneous Before meals and at bedtime  insulin lispro Injectable (HumaLOG) 3 Unit(s) SubCutaneous Before meals and at bedtime  multivitamin 1 Tablet(s) Oral daily  oseltamivir 75 milliGRAM(s) Oral two times a day  thiamine 100 milliGRAM(s) Oral daily    MEDICATIONS  (PRN):  dextrose 40% Gel 15 Gram(s) Oral once PRN Blood Glucose LESS THAN 70 milliGRAM(s)/deciliter  glucagon  Injectable 1 milliGRAM(s) IntraMuscular once PRN Glucose LESS THAN 70 milligrams/deciliter

## 2020-01-03 NOTE — DIETITIAN INITIAL EVALUATION ADULT. - PERTINENT LABORATORY DATA
01-03 Na130 mmol/L<L> Glu 179 mg/dL<H> K+ 3.2 mmol/L<L> Cr  0.40 mg/dL<L> BUN 7.0 mg/dL<L> Phos 2.6 mg/dL Alb n/a   PAB n/a HgA1c 13.6%

## 2020-01-03 NOTE — PROGRESS NOTE ADULT - ASSESSMENT
23 y/o male with PMHx of DM I, ETOh abuse (last drink 2 days ago, drinks 3-4 beers daily, was sober x 1.5 years until recently presents with nausea/vomiting x 1 day and SOB found to be in severe DKA with AG of > 35, , pH 6.85.  Patient states that he has been noncompliant on his meds. Found to be influenza B positive. Currently on IVF and insulin gtt with improvement in anion gap.     1) DKA 2/2 non-compliant DM I  - Lantus 20 units every morning  - ISS before meals and at bedtime  - Humalog 3 units before meals and at bedtime  - monitor BMP    2) Influenza B  - Tamiflu day 2 of 5  - supportive care    3) Leukocytosis  - can be from flu and reactive as well  - greatly improved from 40K now 15K  - repeat in am    4) Alcohol Abuse  - continue thiamine, folic acid, and MV  - social work consult    5) Prophylactic measures  -DVT ppx: SCDs 23 y/o male with PMHx of DM I, ETOh abuse (last drink 2 days ago, drinks 3-4 beers daily, was sober x 1.5 years until recently presents with nausea/vomiting x 1 day and SOB found to be in severe DKA with AG of > 35, , pH 6.85.  Patient states that he has been noncompliant on his meds. Found to be influenza B positive. DKA has resolved and on day 2 of Tamiflu for flu.     1) DKA 2/2 non-compliant DM I  - Lantus 20 units every morning  - ISS before meals and at bedtime  - Humalog 3 units before meals and at bedtime  - monitor BMP    2) Influenza B  - Tamiflu day 2 of 5  - supportive care    3) Leukocytosis  - can be from flu and reactive as well  - greatly improved from 40K now 15K  - repeat in am    4) Alcohol Abuse  - continue thiamine, folic acid, and MV  - social work consult    5) Prophylactic measures  -DVT ppx: SCDs

## 2020-01-03 NOTE — DIETITIAN INITIAL EVALUATION ADULT. - CONTINUE CURRENT NUTRITION CARE PLAN
-- add Glucerna TID to optimize po intake and provide an additional 220 kcal, 10g protein per serving./yes

## 2020-01-03 NOTE — CHART NOTE - NSCHARTNOTEFT_GEN_A_CORE
Upon Nutritional Assessment by the Registered Dietitian your patient was determined to meet criteria / has evidence of the following diagnosis/diagnoses:          [ ]  Mild Protein Calorie Malnutrition        [ ]  Moderate Protein Calorie Malnutrition        [ x] Severe Protein Calorie Malnutrition        [ ] Unspecified Protein Calorie Malnutrition        [ ] Underweight / BMI <19        [ ] Morbid Obesity / BMI > 40    Pt presents at high nutrition risk secondary to malnutrition (severe, chronic) related to uncontrolled DM/medication non-compliance and inability to meet sufficient needs secondary to alcohol abuse and +Flu as evidenced by severe muscle loss of temples, clavicles, shoulders, severe fat loss of orbitals and buccal pads and meeting <50% nutrient needs >5 days.     Findings as based on:  •  Comprehensive nutrition assessment and consultation  •  Calorie counts (nutrient intake analysis)  •  Food acceptance and intake status from observations by staff  •  Follow up  •  Patient education  •  Intervention secondary to interdisciplinary rounds  •   concerns      Treatment:    The following has been recommended:    1) Continue diet as tolerated.  2) Add Glucerna TID to optimize po intake and provide an additional 220 kcal, 10g protein per serving.  3) Continue MVI, thiamine, and folic acid supplementation.  4) Encourage po intake.  5) Obtain daily weights to monitor trends.       PROVIDER Section:     By signing this assessment you are acknowledging and agree with the diagnosis/diagnoses assigned by the Registered Dietitian    Comments:

## 2020-01-03 NOTE — ADVANCED PRACTICE NURSE CONSULT - RECOMMEDATIONS
continue diabetes self management education  pt to inject all insulin doses while in the hospital using prefilled insulin syringe and rn supervision  pt to perform all bg monitoring including fs.  pt to schedule his fu appointment kinga Meraz from dr lafleur's office today  prior to dc from icu   cc- pls task pt to diabetes wellness out pt

## 2020-01-03 NOTE — PROVIDER CONTACT NOTE (EICU) - ACTION/TREATMENT ORDERED:
As per [place] electrolyte standard will give 15 millimoles of Kphos, which also gives 22 MEQS of K+, will give additional 40 MEQs KCL PO and 10 MEQ KCL rider x2. FOr mag level will give 4 grams total of mag sulfate

## 2020-01-03 NOTE — DIETITIAN INITIAL EVALUATION ADULT. - MALNUTRITION
NFPE: severe muscle loss of temples, clavicles, shoulders, severe fat loss of orbitals and buccal pads severe, chronic

## 2020-01-03 NOTE — PROGRESS NOTE ADULT - ASSESSMENT
25 y/o male with pmhx of DM I, etoh abuse presents with dehydration, malnourishment, found to be in severe DKA secondary likely 2/2 medication non compliance exacerbated by influenza B infection.    NEURO: MV, thiamine, folic acid, and social work consult for etoh abuse. serum etoh level <10. utox positive for THC.   CVS: BP and HR stable  PULM: severe metabolic acidosis on presentation with kussmaul breathing, now stable and improved breathing. C/w IV hydration and insulin therapy, monitor for respiratory distress   GI: check amylase/lipase.  RENAL: NEIL resolved, creatinine back to baseline today. monitor urine output and lytes. Monitor potassium while on insulin drip, trend BMP q 4-6 hours while on insulin drip.  ID: Influenza B positive, started on tamiflu. Cultures pending. Procalcitonin 0.42. CXR with small foci of consolidation in right CP angle and UA   ENDO: Initial AG 35, now 17 this morning. C/w insulin drip at 6 units/hr, q 1 hour accuchecks. aggressive IVF hydration, [id number] to 250 cc/hr today due to current fingerstick glucose readings between . when glucose < 250 switch to dextrose containing fluids. bridge to long actin insulin when AG 12. hgbA1C 13.6. f/u endo and diabetes specialist consult.   HEME: heparin for dvt prophylaxis 25 y/o male with pmhx of DM I, etoh abuse presents with dehydration, malnourishment, found to be in severe DKA secondary likely 2/2 medication non compliance exacerbated by influenza B infection.    NEURO: c/w MV, thiamine, folic acid, and social work consult for etoh abuse. serum etoh level <10. utox positive for THC.   CVS: BP and HR improved with IVF hydration  PULM: severe metabolic acidosis on presentation with kussmaul breathing, now stable and improved breathing. C/w IV hydration and insulin therapy, monitor for respiratory distress   GI: lipase 11, f/u amylase, no abdominal pain  RENAL: NEIL resolved, creatinine back to baseline today. monitor urine output and lytes. Monitor potassium while on insulin drip, trend BMP q 4-6 hours while on insulin drip.  ID: Influenza B positive, started on tamiflu. Cultures pending. Procalcitonin 0.42. CXR with small foci of consolidation in right CP angle (s/p 1 dose of azactam) and UA negative for infection  ENDO: Initial AG 35, now 17 this morning. C/w insulin drip at 6 units/hr until AG around 12, q 1 hour accuchecks. aggressive IVF hydration, increase D5LR to 250 cc/hr today due to current fingerstick glucose readings between . when glucose < 250 switch to dextrose containing fluids. bridge to long actin insulin when AG 12. hgbA1C 13.6. f/u endo and diabetes specialist consult.   HEME: heparin for dvt prophylaxis

## 2020-01-03 NOTE — DIETITIAN INITIAL EVALUATION ADULT. - OTHER INFO
24 year old male with PMH of DMI, EtOH abuse presents with dehydration, malnourishment, found to be in severe DKA secondary likely 2/2 medication non compliance exacerbated by influenza B infection. Pt reports decreased appetite/po intake PTA due to not feeling well. States he typically consumes smaller, frequent meals throughout the day and follows a regular diet. Reports he occasionally checks his blood sugars (~1x/day). Pt is non-compliant with medications and drinks ~3-4 beers daily. Pt uncertain of current weight (not documented in EMR, unable to obtain as pt in chair). UBW ~125 lbs and believes to be weighing that currently. Noted lunch tray observed at bedside, ~50% consumed per plate waste. Provided with verbal/written literature on meal planning with plate method. Educated the importance of blood glucose monitoring and consistent, balanced-meals. Aware pt being seen by inpatient CDE. Encouraged pt to see outpatient RD, CDE once discharged as well.

## 2020-01-03 NOTE — DIETITIAN INITIAL EVALUATION ADULT. - ETIOLOGY
related to uncontrolled DM/medication non-compliance and inability to meet sufficient needs secondary to alcohol abuse and +Flu

## 2020-01-03 NOTE — PROGRESS NOTE ADULT - ATTENDING COMMENTS
Pt seen and examined     PHYSICAL EXAM:  Vital Signs Last 24 Hrs  T(C): 37.3 (03 Jan 2020 11:38), Max: 38 (03 Jan 2020 07:48)  T(F): 99.2 (03 Jan 2020 11:38), Max: 100.4 (03 Jan 2020 07:48)  HR: 100 (03 Jan 2020 14:00) (96 - 141)  BP: 112/70 (03 Jan 2020 14:00) (106/55 - 181/104)  BP(mean): 87 (03 Jan 2020 14:00) (73 - 97)  RR: 39 (03 Jan 2020 14:00) (22 - 101)  SpO2: 96% (03 Jan 2020 14:00) (93% - 99%)    GENERAL: NAD, well-groomed, well-developed  HEAD:  Atraumatic, Normocephalic  EYES: EOMI, PERRLA, conjunctiva and sclera clear  ENMT: No tonsillar erythema, exudates, or enlargement; Moist mucous membranes  NERVOUS SYSTEM:  Alert & Oriented X3, Good concentration; Motor Strength 5/5 B/L upper and lower extremities  CHEST/LUNG: Clear to auscultation bilaterally; No rales, rhonchi, wheezing, or rubs  HEART: Regular rate and rhythm; No murmurs, rubs, or gallops  ABDOMEN: Soft, Nontender, Nondistended; Bowel sounds present  EXTREMITIES:  2+ Peripheral Pulses, No clubbing, cyanosis, or edema    DKA has resolved  - contrinue insulin, CSI and fingerstick   - tolerating po intake    Flu  - on day 2 of 5 of tamiflu    Agree with above assessment and plan
25 y/o male with pmhx of DM I, etoh abuse (last drink 2 days ago, drinks 3-4 beers daily, was sober x 1.5 years until recently presents with nausea/vomiting x 1 day and SOB found to be in severe DKA with AG of > 35, , pH 6.85.  Patient states that he has been noncompliant on his meds. Found to be influenza B positive.  S/p AG close and transitioned to Lantus with premeal and moderate ISS  Education regarding more compliance with insulin regimen   Counselling regarding complete cesation of THC  Can be transferred out of MICU

## 2020-01-03 NOTE — SBIRT NOTE ADULT - NSSBIRTBRIEFINTDET_GEN_A_CORE
explored pts desire to seek treatment, pt reports he stops on his own, doesn't want resources at this time

## 2020-01-03 NOTE — PROGRESS NOTE ADULT - SUBJECTIVE AND OBJECTIVE BOX
PMD :  Cardio :     Patient is a 24y old  Male who presents with a chief complaint of DKA (2020 10:17)      HPI:  23 y/o male with pmhx of DM I, etoh abuse (last drink 2 days ago, drinks 3-4 beers daily, was sober x 1.5 years until recently presents with nausea/vomiting x 1 day and SOB found to be in severe DKA with AG of > 35, , pH 6.85. Patient states that he has been noncompliant on his meds. Has also been around his two children who have been sick with viral illnesses. Denies chest pain, abdominal pain. Afebrile with WBC 40. (2020 18:00)      PAST MEDICAL & SURGICAL HISTORY:  Diabetes, type I  Recovering alcoholic  No significant past surgical history      Social History:  Tabacco -   ETOH -   Illicit drug abuse - denies    FAMILY HISTORY:      Allergies    penicillin (Hives)    Intolerances        HOME MEDICATIONS :     REVIEW OF SYSTEMS:    CONSTITUTIONAL: No fever, weight loss, or fatigue  EYES: No eye pain, visual disturbances, or discharge  NECK: No pain or stiffness  RESPIRATORY: No cough, wheezing, chills or hemoptysis; No shortness of breath  CARDIOVASCULAR: No chest pain, palpitations, dizziness, or leg swelling  GASTROINTESTINAL: No abdominal or epigastric pain. No nausea, vomiting, or hematemesis; No diarrhea or constipation. No melena or hematochezia.  GENITOURINARY: No dysuria, frequency, hematuria, or incontinence  NEUROLOGICAL: No headaches, memory loss, loss of strength, numbness, or tremors  SKIN: No itching, burning, rashes, or lesions   LYMPH NODES: No enlarged glands  ENDOCRINE: No heat or cold intolerance; No hair loss  MUSCULOSKELETAL:   PSYCHIATRIC: No depression, anxiety, mood swings, or difficulty sleeping  HEME/LYMPH: No easy bruising, or bleeding gums  ALLERGY AND IMMUNOLOGIC: No hives or eczema    MEDICATIONS  (STANDING):  benzocaine 15 mG/menthol 3.6 mG (Sugar-Free) Lozenge 1 Lozenge Oral every 2 hours  chlorhexidine 2% Cloths 1 Application(s) Topical <User Schedule>  folic acid 1 milliGRAM(s) Oral daily  heparin  Injectable 5000 Unit(s) SubCutaneous every 8 hours  insulin glargine Injectable (LANTUS) 20 Unit(s) SubCutaneous once  multivitamin 1 Tablet(s) Oral daily  oseltamivir 75 milliGRAM(s) Oral two times a day  thiamine 100 milliGRAM(s) Oral daily    MEDICATIONS  (PRN):      Vital Signs Last 24 Hrs  T(C): 37.3 (2020 11:38), Max: 38 (2020 07:48)  T(F): 99.2 (2020 11:38), Max: 100.4 (2020 07:48)  HR: 96 (2020 13:00) (96 - 141)  BP: 106/55 (2020 13:00) (106/55 - 181/104)  BP(mean): 74 (2020 13:00) (73 - 97)  RR: 26 (2020 13:00) (22 - 101)  SpO2: 95% (2020 13:00) (93% - 99%)    PHYSICAL EXAM:    GENERAL: NAD, well-groomed, well-developed  HEAD:  Atraumatic, Normocephalic  EYES: EOMI, PERRLA, conjunctiva and sclera clear  NECK: Supple, No JVD, Normal thyroid  NERVOUS SYSTEM:  Alert & Oriented X3, Good concentration; Motor Strength 5/5 B/L upper and lower extremities; DTRs 2+ intact and symmetric  CHEST/LUNG: CTA  b/l,  no rales, rhonchi, wheezing, or rubs  HEART: Regular rate and rhythm; No murmurs, rubs, or gallops  ABDOMEN: Soft, Nontender, Nondistended; Bowel sounds present  EXTREMITIES:  2+ Peripheral Pulses, No clubbing, cyanosis, or edema ,   LYMPH: No lymphadenopathy noted  SKIN: No rashes or lesions    LABS:                        10.5   15.86 )-----------( 172      ( 2020 06:25 )             30.2     01-03    130<L>  |  98  |  7.0<L>  ----------------------------<  179<H>  3.2<L>   |  20.0<L>  |  0.40<L>    Ca    8.5<L>      2020 13:17  Phos  2.6     01-03  Mg     2.2     01-03    TPro  6.0<L>  /  Alb  3.4  /  TBili  <0.2<L>  /  DBili  x   /  AST  25  /  ALT  12  /  AlkPhos  118  01-03      Urinalysis Basic - ( 2020 19:35 )    Color: Yellow / Appearance: Clear / S.020 / pH: x  Gluc: x / Ketone: Large  / Bili: Negative / Urobili: Negative mg/dL   Blood: x / Protein: 100 mg/dL / Nitrite: Negative   Leuk Esterase: Negative / RBC: 0-2 /HPF / WBC 3-5   Sq Epi: x / Non Sq Epi: Occasional / Bacteria: Occasional          RADIOLOGY & ADDITIONAL STUDIES: Patient is a 24y old  Male who presents with a chief complaint of DKA (2020 10:17)    cc: high sugars    HPI:  25 y/o male with pmhx of DM I, etoh abuse (last drink 2 days ago, drinks 3-4 beers daily, was sober x 1.5 years until recently presents with nausea/vomiting x 1 day and SOB found to be in severe DKA with AG of > 35, , pH 6.85. Patient states that he has been noncompliant on his meds. Has also been around his two children who have been sick with viral illnesses. Denies chest pain, abdominal pain. Afebrile with WBC 40. (2020 18:00)      PAST MEDICAL & SURGICAL HISTORY:  Diabetes, type I  Recovering alcoholic  No prior history of surgery per patient      Social History:  Tobacco - 1/2 pack per day  ETOH - 3-4 20oz beers 3-4 times/week  Illicit drug abuse - denies    FAMILY HISTORY:  Mom- diabetes    Allergies:  penicillin (Hives)      REVIEW OF SYSTEMS:  All other ROS are negative except for what is in HPI    MEDICATIONS  (STANDING):  benzocaine 15 mG/menthol 3.6 mG (Sugar-Free) Lozenge 1 Lozenge Oral every 2 hours  chlorhexidine 2% Cloths 1 Application(s) Topical <User Schedule>  folic acid 1 milliGRAM(s) Oral daily  heparin  Injectable 5000 Unit(s) SubCutaneous every 8 hours  insulin glargine Injectable (LANTUS) 20 Unit(s) SubCutaneous once  multivitamin 1 Tablet(s) Oral daily  oseltamivir 75 milliGRAM(s) Oral two times a day  thiamine 100 milliGRAM(s) Oral daily    MEDICATIONS  (PRN):      Vital Signs Last 24 Hrs  T(C): 37.3 (2020 11:38), Max: 38 (2020 07:48)  T(F): 99.2 (2020 11:38), Max: 100.4 (2020 07:48)  HR: 96 (2020 13:00) (96 - 141)  BP: 106/55 (2020 13:00) (106/55 - 181/104)  BP(mean): 74 (2020 13:00) (73 - 97)  RR: 26 (2020 13:00) (22 - 101)  SpO2: 95% (2020 13:00) (93% - 99%)    PHYSICAL EXAM:    GENERAL: NAD, lying comfortably in bed  HEAD:  Atraumatic, Normocephalic  EYES: EOMI, PERRLA, conjunctiva and sclera clear  NECK: Supple, No JVD, Normal thyroid  NERVOUS SYSTEM:  Alert & Oriented X3, Motor Strength 5/5 B/L upper and lower extremities  CHEST/LUNG: CTA  b/l,  no rales, rhonchi, wheezing, or rubs  HEART: Regular rate and rhythm; No murmurs, rubs, or gallops  ABDOMEN: Soft, Nontender, Nondistended; Bowel sounds present  EXTREMITIES:  2+ Peripheral Pulses, No clubbing, cyanosis, or edema ,   LYMPH: No lymphadenopathy noted  SKIN: No rashes or lesions    LABS:                        10.5   15.86 )-----------( 172      ( 2020 06:25 )             30.2     01-03    130<L>  |  98  |  7.0<L>  ----------------------------<  179<H>  3.2<L>   |  20.0<L>  |  0.40<L>    Ca    8.5<L>      2020 13:17  Phos  2.6     01-03  Mg     2.2     01-03    TPro  6.0<L>  /  Alb  3.4  /  TBili  <0.2<L>  /  DBili  x   /  AST  25  /  ALT  12  /  AlkPhos  118  01-03      Urinalysis Basic - ( 2020 19:35 )    Color: Yellow / Appearance: Clear / S.020 / pH: x  Gluc: x / Ketone: Large  / Bili: Negative / Urobili: Negative mg/dL   Blood: x / Protein: 100 mg/dL / Nitrite: Negative   Leuk Esterase: Negative / RBC: 0-2 /HPF / WBC 3-5   Sq Epi: x / Non Sq Epi: Occasional / Bacteria: Occasional          RADIOLOGY & ADDITIONAL STUDIES: Patient is a 24y old  Male who presents with a chief complaint of DKA (2020 10:17)    cc: high sugars    HPI:  23 y/o male with pmhx of DM I, etoh abuse (last drink 2 days ago, drinks 3-4 beers daily, was sober x 1.5 years until recently presents with nausea/vomiting x 1 day and SOB found to be in severe DKA with AG of > 35, , pH 6.85. Patient states that he has been noncompliant on his meds. Has also been around his two children who have been sick with viral illnesses. Denies chest pain, abdominal pain. Afebrile with WBC 40. Pt admitted for DKA to MICU. DKA has resolved and on day 2 of Tamiflu for FLU. Pt downgraded to Medical Serivice.      PAST MEDICAL & SURGICAL HISTORY:  Diabetes, type I  Recovering alcoholic  No prior history of surgery per patient      Social History:  Tobacco - 1/2 pack per day  ETOH - 3-4 20oz beers 3-4 times/week  Illicit drug abuse - denies    FAMILY HISTORY:  Mom- diabetes    Allergies:  penicillin (Hives)      REVIEW OF SYSTEMS:  All other ROS are negative except for what is in HPI    MEDICATIONS  (STANDING):  benzocaine 15 mG/menthol 3.6 mG (Sugar-Free) Lozenge 1 Lozenge Oral every 2 hours  chlorhexidine 2% Cloths 1 Application(s) Topical <User Schedule>  folic acid 1 milliGRAM(s) Oral daily  heparin  Injectable 5000 Unit(s) SubCutaneous every 8 hours  insulin glargine Injectable (LANTUS) 20 Unit(s) SubCutaneous once  multivitamin 1 Tablet(s) Oral daily  oseltamivir 75 milliGRAM(s) Oral two times a day  thiamine 100 milliGRAM(s) Oral daily    MEDICATIONS  (PRN):      Vital Signs Last 24 Hrs  T(C): 37.3 (2020 11:38), Max: 38 (2020 07:48)  T(F): 99.2 (2020 11:38), Max: 100.4 (2020 07:48)  HR: 96 (2020 13:00) (96 - 141)  BP: 106/55 (2020 13:00) (106/55 - 181/104)  BP(mean): 74 (2020 13:00) (73 - 97)  RR: 26 (2020 13:00) (22 - 101)  SpO2: 95% (2020 13:00) (93% - 99%)    PHYSICAL EXAM:    GENERAL: NAD, lying comfortably in bed  HEAD:  Atraumatic, Normocephalic  EYES: EOMI, PERRLA, conjunctiva and sclera clear  NECK: Supple, No JVD, Normal thyroid  NERVOUS SYSTEM:  Alert & Oriented X3, Motor Strength 5/5 B/L upper and lower extremities  CHEST/LUNG: CTA  b/l,  no rales, rhonchi, wheezing, or rubs  HEART: Regular rate and rhythm; No murmurs, rubs, or gallops  ABDOMEN: Soft, Nontender, Nondistended; Bowel sounds present  EXTREMITIES:  2+ Peripheral Pulses, No clubbing, cyanosis, or edema ,   LYMPH: No lymphadenopathy noted  SKIN: No rashes or lesions    LABS:                        10.5   15.86 )-----------( 172      ( 2020 06:25 )             30.2     01-03    130<L>  |  98  |  7.0<L>  ----------------------------<  179<H>  3.2<L>   |  20.0<L>  |  0.40<L>    Ca    8.5<L>      2020 13:17  Phos  2.6     01-03  Mg     2.2     01-03    TPro  6.0<L>  /  Alb  3.4  /  TBili  <0.2<L>  /  DBili  x   /  AST  25  /  ALT  12  /  AlkPhos  118  01-03      Urinalysis Basic - ( 2020 19:35 )    Color: Yellow / Appearance: Clear / S.020 / pH: x  Gluc: x / Ketone: Large  / Bili: Negative / Urobili: Negative mg/dL   Blood: x / Protein: 100 mg/dL / Nitrite: Negative   Leuk Esterase: Negative / RBC: 0-2 /HPF / WBC 3-5   Sq Epi: x / Non Sq Epi: Occasional / Bacteria: Occasional

## 2020-01-03 NOTE — PROGRESS NOTE ADULT - SUBJECTIVE AND OBJECTIVE BOX
Patient is a 24y old  Male who presents with a chief complaint of     BRIEF HOSPITAL COURSE: 25 y/o male with pmhx of DM I, etoh abuse (last drink 2 days ago, drinks 3-4 beers daily, was sober x 1.5 years until recently presents with nausea/vomiting x 1 day and SOB found to be in severe DKA with AG of > 35, , pH 6.85. Patient states that he has been noncompliant on his meds. Has also been around his two children who have been sick with viral illnesses. Denies chest pain, abdominal pain. Afebrile with WBC 40.    Events last 24 hours: Patient     PAST MEDICAL & SURGICAL HISTORY:  Diabetes, type I  Recovering alcoholic  No significant past surgical history    ICU Vital Signs Last 24 Hrs  T(F): 99.5 (2020 08:00), Max: 100.4 (2020 07:48)  HR: 100 (2020 09:00) (97 - 141)  BP: 116/62 (2020 09:00) (106/55 - 181/104)  BP(mean): 81 (2020 09:00) (73 - 97)  RR: 25 (2020 09:00) (22 - 101)  SpO2: 96% (2020 09:00) (93% - 99%)    Physical Examination:  General: No acute distress.    HEENT: Pupils equal, reactive to light.  Symmetric.  PULM: Clear to auscultation bilaterally, no significant sputum production  NECK: Supple, no lymphadenopathy, trachea midline  CVS: Regular rate and rhythm, no murmurs, rubs, or gallops  ABD: Soft, nondistended, nontender, normoactive bowel sounds, no masses  EXT: No edema, nontender  SKIN: Warm and well perfused, no rashes noted.  NEURO: Alert, oriented, interactive, nonfocal    ABG - ( 2020 17:11 )  pH, Arterial: 6.85  pH, Blood: x     /  pCO2: 10    /  pO2: 139   / HCO3: ?5.0  / Base Excess: ?-30.7 /  SaO2: 97        I&O's Detail    2020 07:01  -  2020 07:00  --------------------------------------------------------  IN:    dextrose 5% + lactated ringers.: 2200 mL    insulin regular Infusion: 42 mL    Solution: 166.6 mL  Total IN: 2408.6 mL    OUT:    Indwelling Catheter - Urethral: 5125 mL  Total OUT: 5125 mL    Total NET: -2716.4 mL      2020 07:01  -  2020 10:18  --------------------------------------------------------  IN:    dextrose 5% + lactated ringers.: 450 mL    insulin regular Infusion: 4 mL    Solution: 125.4 mL    Solution: 100 mL    Solution: 200 mL  Total IN: 879.4 mL    OUT:    Indwelling Catheter - Urethral: 165 mL  Total OUT: 165 mL    Total NET: 714.4 mL    LABS:                        10.5   15.86 )-----------( 172      ( 2020 06:25 )             30.2     01-03    136  |  103  |  11.0  ----------------------------<  104  3.2<L>   |  16.0<L>  |  0.44<L>    Ca    9.0      2020 06:25  Phos  2.3     -03  Mg     1.5     -    TPro  6.0<L>  /  Alb  3.4  /  TBili  <0.2<L>  /  DBili  x   /  AST  25  /  ALT  12  /  AlkPhos  118  01-03    CAPILLARY BLOOD GLUCOSE  POCT Blood Glucose.: 88 mg/dL (2020 09:14)    Urinalysis Basic - ( 2020 19:35 )    Color: Yellow / Appearance: Clear / S.020 / pH: x  Gluc: x / Ketone: Large  / Bili: Negative / Urobili: Negative mg/dL   Blood: x / Protein: 100 mg/dL / Nitrite: Negative   Leuk Esterase: Negative / RBC: 0-2 /HPF / WBC 3-5   Sq Epi: x / Non Sq Epi: Occasional / Bacteria: Occasional    CULTURES:  Rapid RVP Result: Detected ( @ 19:08)    Xray Chest 1 View AP/PA. (20 @ 18:58)   Impression:Patchy density in the right CP angle suggests small area of linear atelectasis    Medications:  oseltamivir 75 milliGRAM(s) Oral two times a day    heparin  Injectable 5000 Unit(s) SubCutaneous every 8 hours    insulin regular Infusion 6 Unit(s)/Hr IV Continuous <Continuous>    dextrose 5% + lactated ringers. 1000 milliLiter(s) IV Continuous <Continuous>  folic acid 1 milliGRAM(s) Oral daily  multivitamin 1 Tablet(s) Oral daily  thiamine 100 milliGRAM(s) Oral daily    benzocaine 15 mG/menthol 3.6 mG (Sugar-Free) Lozenge 1 Lozenge Oral every 2 hours  chlorhexidine 2% Cloths 1 Application(s) Topical <User Schedule> Patient is a 24y old  Male who presents with a chief complaint of     BRIEF HOSPITAL COURSE: 23 y/o male with pmhx of DM I, etoh abuse (last drink 2 days ago, drinks 3-4 beers daily, was sober x 1.5 years until recently presents with nausea/vomiting x 1 day and SOB found to be in severe DKA with AG of > 35, , pH 6.85. Patient states that he has been noncompliant on his meds. Found to be influenza B positive.     Events last 24 hours: Patient seen and examined at bedside, no acute events overnight. Patient had low grade temp this morning. Denies any nausea, vomiting, headache, fevers, chills, abdominal pain, palpitations.    PAST MEDICAL & SURGICAL HISTORY:  Diabetes, type I  Recovering alcoholic  No significant past surgical history    ICU Vital Signs Last 24 Hrs  T(F): 99.5 (2020 08:00), Max: 100.4 (2020 07:48)  HR: 100 (2020 09:00) (97 - 141)  BP: 116/62 (2020 09:00) (106/55 - 181/104)  BP(mean): 81 (2020 09:00) (73 - 97)  RR: 25 (2020 09:00) (22 - 101)  SpO2: 96% (2020 09:00) (93% - 99%)    Physical Examination:  General: No acute distress.    HEENT: Pupils equal, reactive to light.  Symmetric.  PULM: Clear to auscultation bilaterally, no significant sputum production  NECK: Supple, no lymphadenopathy, trachea midline  CVS: Regular rate and rhythm, no murmurs, rubs, or gallops  ABD: Soft, nondistended, nontender, normoactive bowel sounds, no masses  EXT: No edema, nontender  SKIN: Warm and well perfused, no rashes noted. dry MM  NEURO: Alert, oriented, interactive, nonfocal    ABG - ( 2020 17:11 )  pH, Arterial: 6.85  pH, Blood: x     /  pCO2: 10    /  pO2: 139   / HCO3: ?5.0  / Base Excess: ?-30.7 /  SaO2: 97        I&O's Detail    2020 07:01  -  2020 07:00  --------------------------------------------------------  IN:    dextrose 5% + lactated ringers.: 2200 mL    insulin regular Infusion: 42 mL    Solution: 166.6 mL  Total IN: 2408.6 mL    OUT:    Indwelling Catheter - Urethral: 5125 mL  Total OUT: 5125 mL    Total NET: -2716.4 mL      2020 07:01  -  2020 10:18  --------------------------------------------------------  IN:    dextrose 5% + lactated ringers.: 450 mL    insulin regular Infusion: 4 mL    Solution: 125.4 mL    Solution: 100 mL    Solution: 200 mL  Total IN: 879.4 mL    OUT:    Indwelling Catheter - Urethral: 165 mL  Total OUT: 165 mL    Total NET: 714.4 mL    LABS:                        10.5   15.86 )-----------( 172      ( 2020 06:25 )             30.2     01-03    136  |  103  |  11.0  ----------------------------<  104  3.2<L>   |  16.0<L>  |  0.44<L>    Ca    9.0      2020 06:25  Phos  2.3     -03  Mg     1.5     -    TPro  6.0<L>  /  Alb  3.4  /  TBili  <0.2<L>  /  DBili  x   /  AST  25  /  ALT  12  /  AlkPhos  118  01-03    CAPILLARY BLOOD GLUCOSE  POCT Blood Glucose.: 88 mg/dL (2020 09:14)    Urinalysis Basic - ( 2020 19:35 )    Color: Yellow / Appearance: Clear / S.020 / pH: x  Gluc: x / Ketone: Large  / Bili: Negative / Urobili: Negative mg/dL   Blood: x / Protein: 100 mg/dL / Nitrite: Negative   Leuk Esterase: Negative / RBC: 0-2 /HPF / WBC 3-5   Sq Epi: x / Non Sq Epi: Occasional / Bacteria: Occasional    CULTURES:  Rapid RVP Result: Detected ( @ 19:08)    Xray Chest 1 View AP/PA. (20 @ 18:58)   Impression:Patchy density in the right CP angle suggests small area of linear atelectasis    Medications:  oseltamivir 75 milliGRAM(s) Oral two times a day    heparin  Injectable 5000 Unit(s) SubCutaneous every 8 hours    insulin regular Infusion 6 Unit(s)/Hr IV Continuous <Continuous>    dextrose 5% + lactated ringers. 1000 milliLiter(s) IV Continuous <Continuous>  folic acid 1 milliGRAM(s) Oral daily  multivitamin 1 Tablet(s) Oral daily  thiamine 100 milliGRAM(s) Oral daily    benzocaine 15 mG/menthol 3.6 mG (Sugar-Free) Lozenge 1 Lozenge Oral every 2 hours  chlorhexidine 2% Cloths 1 Application(s) Topical <User Schedule> Patient is a 24y old  Male who presents with a chief complaint of     BRIEF HOSPITAL COURSE: 23 y/o male with pmhx of DM I, etoh abuse (last drink 2 days ago, drinks 3-4 beers daily, was sober x 1.5 years until recently presents with nausea/vomiting x 1 day and SOB found to be in severe DKA with AG of > 35, , pH 6.85.  Patient states that he has been noncompliant on his meds. Found to be influenza B positive. Currently on IVF and insulin gtt with improvement in anion gap.     Events last 24 hours: Patient seen and examined at bedside, no acute events overnight. Patient had low grade temp this morning. Denies any nausea, vomiting, headache, fevers, chills, abdominal pain, palpitations.    PAST MEDICAL & SURGICAL HISTORY:  Diabetes, type I  Recovering alcoholic  No significant past surgical history    ICU Vital Signs Last 24 Hrs  T(F): 99.5 (2020 08:00), Max: 100.4 (2020 07:48)  HR: 100 (2020 09:00) (97 - 141)  BP: 116/62 (2020 09:00) (106/55 - 181/104)  BP(mean): 81 (2020 09:00) (73 - 97)  RR: 25 (2020 09:00) (22 - 101)  SpO2: 96% (2020 09:00) (93% - 99%)    Physical Examination:  General: No acute distress.    HEENT: Pupils equal, reactive to light.  Symmetric.  PULM: Clear to auscultation bilaterally, no significant sputum production  NECK: Supple, no lymphadenopathy, trachea midline  CVS: Regular rate and rhythm, no murmurs, rubs, or gallops  ABD: Soft, nondistended, nontender, normoactive bowel sounds, no masses  EXT: No edema, nontender  SKIN: Warm and well perfused, no rashes noted. dry MM  NEURO: Alert, oriented, interactive, nonfocal    ABG - ( 2020 17:11 )  pH, Arterial: 6.85  pH, Blood: x     /  pCO2: 10    /  pO2: 139   / HCO3: ?5.0  / Base Excess: ?-30.7 /  SaO2: 97        I&O's Detail    2020 07:01  -  2020 07:00  --------------------------------------------------------  IN:    dextrose 5% + lactated ringers.: 2200 mL    insulin regular Infusion: 42 mL    Solution: 166.6 mL  Total IN: 2408.6 mL    OUT:    Indwelling Catheter - Urethral: 5125 mL  Total OUT: 5125 mL    Total NET: -2716.4 mL      2020 07:01  -  2020 10:18  --------------------------------------------------------  IN:    dextrose 5% + lactated ringers.: 450 mL    insulin regular Infusion: 4 mL    Solution: 125.4 mL    Solution: 100 mL    Solution: 200 mL  Total IN: 879.4 mL    OUT:    Indwelling Catheter - Urethral: 165 mL  Total OUT: 165 mL    Total NET: 714.4 mL    LABS:                        10.5   15.86 )-----------( 172      ( 2020 06:25 )             30.2     01-03    136  |  103  |  11.0  ----------------------------<  104  3.2<L>   |  16.0<L>  |  0.44<L>    Ca    9.0      2020 06:25  Phos  2.3     01-03  Mg     1.5     -    TPro  6.0<L>  /  Alb  3.4  /  TBili  <0.2<L>  /  DBili  x   /  AST  25  /  ALT  12  /  AlkPhos  118  01-03    CAPILLARY BLOOD GLUCOSE  POCT Blood Glucose.: 88 mg/dL (2020 09:14)    Urinalysis Basic - ( 2020 19:35 )    Color: Yellow / Appearance: Clear / S.020 / pH: x  Gluc: x / Ketone: Large  / Bili: Negative / Urobili: Negative mg/dL   Blood: x / Protein: 100 mg/dL / Nitrite: Negative   Leuk Esterase: Negative / RBC: 0-2 /HPF / WBC 3-5   Sq Epi: x / Non Sq Epi: Occasional / Bacteria: Occasional    CULTURES:  Rapid RVP Result: Detected ( @ 19:08)    Xray Chest 1 View AP/PA. (20 @ 18:58)   Impression:Patchy density in the right CP angle suggests small area of linear atelectasis    Medications:  oseltamivir 75 milliGRAM(s) Oral two times a day    heparin  Injectable 5000 Unit(s) SubCutaneous every 8 hours    insulin regular Infusion 6 Unit(s)/Hr IV Continuous <Continuous>    dextrose 5% + lactated ringers. 1000 milliLiter(s) IV Continuous <Continuous>  folic acid 1 milliGRAM(s) Oral daily  multivitamin 1 Tablet(s) Oral daily  thiamine 100 milliGRAM(s) Oral daily    benzocaine 15 mG/menthol 3.6 mG (Sugar-Free) Lozenge 1 Lozenge Oral every 2 hours  chlorhexidine 2% Cloths 1 Application(s) Topical <User Schedule> Patient is a 24y old  Male who presents with a chief complaint of nausea/vomiting.    BRIEF HOSPITAL COURSE: 25 y/o male with pmhx of DM I, etoh abuse (last drink 2 days ago, drinks 3-4 beers daily, was sober x 1.5 years until recently presents with nausea/vomiting x 1 day and SOB found to be in severe DKA with AG of > 35, , pH 6.85.  Patient states that he has been noncompliant on his meds. Found to be influenza B positive. Currently on IVF and insulin gtt with improvement in anion gap.     Events last 24 hours: Patient seen and examined at bedside, no acute events overnight. Patient had low grade temp this morning. Denies any nausea, vomiting, headache, fevers, chills, abdominal pain, palpitations.    PAST MEDICAL & SURGICAL HISTORY:  Diabetes, type I  Recovering alcoholic  No significant past surgical history    ICU Vital Signs Last 24 Hrs  T(F): 99.5 (2020 08:00), Max: 100.4 (2020 07:48)  HR: 100 (2020 09:00) (97 - 141)  BP: 116/62 (2020 09:00) (106/55 - 181/104)  BP(mean): 81 (2020 09:00) (73 - 97)  RR: 25 (2020 09:00) (22 - 101)  SpO2: 96% (2020 09:00) (93% - 99%)    Physical Examination:  General: No acute distress.    HEENT: Pupils equal, reactive to light.  Symmetric.  PULM: Clear to auscultation bilaterally, no significant sputum production  NECK: Supple, no lymphadenopathy, trachea midline  CVS: Regular rate and rhythm, no murmurs, rubs, or gallops  ABD: Soft, nondistended, nontender, normoactive bowel sounds, no masses  EXT: No edema, nontender  SKIN: Warm and well perfused, no rashes noted. dry MM  NEURO: Alert, oriented, interactive, nonfocal    ABG - ( 2020 17:11 )  pH, Arterial: 6.85  pH, Blood: x     /  pCO2: 10    /  pO2: 139   / HCO3: ?5.0  / Base Excess: ?-30.7 /  SaO2: 97        I&O's Detail    2020 07:01  -  2020 07:00  --------------------------------------------------------  IN:    dextrose 5% + lactated ringers.: 2200 mL    insulin regular Infusion: 42 mL    Solution: 166.6 mL  Total IN: 2408.6 mL    OUT:    Indwelling Catheter - Urethral: 5125 mL  Total OUT: 5125 mL    Total NET: -2716.4 mL      2020 07:01  -  2020 10:18  --------------------------------------------------------  IN:    dextrose 5% + lactated ringers.: 450 mL    insulin regular Infusion: 4 mL    Solution: 125.4 mL    Solution: 100 mL    Solution: 200 mL  Total IN: 879.4 mL    OUT:    Indwelling Catheter - Urethral: 165 mL  Total OUT: 165 mL    Total NET: 714.4 mL    LABS:                        10.5   15.86 )-----------( 172      ( 2020 06:25 )             30.2     01-03    136  |  103  |  11.0  ----------------------------<  104  3.2<L>   |  16.0<L>  |  0.44<L>    Ca    9.0      2020 06:25  Phos  2.3     01-03  Mg     1.5     -    TPro  6.0<L>  /  Alb  3.4  /  TBili  <0.2<L>  /  DBili  x   /  AST  25  /  ALT  12  /  AlkPhos  118  01-03    CAPILLARY BLOOD GLUCOSE  POCT Blood Glucose.: 88 mg/dL (2020 09:14)    Urinalysis Basic - ( 2020 19:35 )    Color: Yellow / Appearance: Clear / S.020 / pH: x  Gluc: x / Ketone: Large  / Bili: Negative / Urobili: Negative mg/dL   Blood: x / Protein: 100 mg/dL / Nitrite: Negative   Leuk Esterase: Negative / RBC: 0-2 /HPF / WBC 3-5   Sq Epi: x / Non Sq Epi: Occasional / Bacteria: Occasional    CULTURES:  Rapid RVP Result: Detected ( @ 19:08)    Xray Chest 1 View AP/PA. (20 @ 18:58)   Impression:Patchy density in the right CP angle suggests small area of linear atelectasis    Medications:  oseltamivir 75 milliGRAM(s) Oral two times a day    heparin  Injectable 5000 Unit(s) SubCutaneous every 8 hours    insulin regular Infusion 6 Unit(s)/Hr IV Continuous <Continuous>    dextrose 5% + lactated ringers. 1000 milliLiter(s) IV Continuous <Continuous>  folic acid 1 milliGRAM(s) Oral daily  multivitamin 1 Tablet(s) Oral daily  thiamine 100 milliGRAM(s) Oral daily    benzocaine 15 mG/menthol 3.6 mG (Sugar-Free) Lozenge 1 Lozenge Oral every 2 hours  chlorhexidine 2% Cloths 1 Application(s) Topical <User Schedule>

## 2020-01-04 LAB
ANION GAP SERPL CALC-SCNC: 14 MMOL/L — SIGNIFICANT CHANGE UP (ref 5–17)
ANION GAP SERPL CALC-SCNC: 16 MMOL/L — SIGNIFICANT CHANGE UP (ref 5–17)
BUN SERPL-MCNC: 6 MG/DL — LOW (ref 8–20)
BUN SERPL-MCNC: 9 MG/DL — SIGNIFICANT CHANGE UP (ref 8–20)
CALCIUM SERPL-MCNC: 8.8 MG/DL — SIGNIFICANT CHANGE UP (ref 8.6–10.2)
CALCIUM SERPL-MCNC: 9.4 MG/DL — SIGNIFICANT CHANGE UP (ref 8.6–10.2)
CHLORIDE SERPL-SCNC: 92 MMOL/L — LOW (ref 98–107)
CHLORIDE SERPL-SCNC: 94 MMOL/L — LOW (ref 98–107)
CO2 SERPL-SCNC: 23 MMOL/L — SIGNIFICANT CHANGE UP (ref 22–29)
CO2 SERPL-SCNC: 24 MMOL/L — SIGNIFICANT CHANGE UP (ref 22–29)
CREAT SERPL-MCNC: 0.36 MG/DL — LOW (ref 0.5–1.3)
CREAT SERPL-MCNC: 0.57 MG/DL — SIGNIFICANT CHANGE UP (ref 0.5–1.3)
GLUCOSE BLDC GLUCOMTR-MCNC: 116 MG/DL — HIGH (ref 70–99)
GLUCOSE BLDC GLUCOMTR-MCNC: 135 MG/DL — HIGH (ref 70–99)
GLUCOSE BLDC GLUCOMTR-MCNC: 141 MG/DL — HIGH (ref 70–99)
GLUCOSE BLDC GLUCOMTR-MCNC: 218 MG/DL — HIGH (ref 70–99)
GLUCOSE BLDC GLUCOMTR-MCNC: 246 MG/DL — HIGH (ref 70–99)
GLUCOSE SERPL-MCNC: 126 MG/DL — HIGH (ref 70–115)
GLUCOSE SERPL-MCNC: 287 MG/DL — HIGH (ref 70–115)
HCT VFR BLD CALC: 32.3 % — LOW (ref 39–50)
HCT VFR BLD CALC: 33.2 % — LOW (ref 39–50)
HGB BLD-MCNC: 11.1 G/DL — LOW (ref 13–17)
HGB BLD-MCNC: 11.3 G/DL — LOW (ref 13–17)
MAGNESIUM SERPL-MCNC: 1.4 MG/DL — LOW (ref 1.6–2.6)
MAGNESIUM SERPL-MCNC: 1.8 MG/DL — SIGNIFICANT CHANGE UP (ref 1.6–2.6)
MCHC RBC-ENTMCNC: 33.7 PG — SIGNIFICANT CHANGE UP (ref 27–34)
MCHC RBC-ENTMCNC: 33.8 PG — SIGNIFICANT CHANGE UP (ref 27–34)
MCHC RBC-ENTMCNC: 34 GM/DL — SIGNIFICANT CHANGE UP (ref 32–36)
MCHC RBC-ENTMCNC: 34.4 GM/DL — SIGNIFICANT CHANGE UP (ref 32–36)
MCV RBC AUTO: 98.2 FL — SIGNIFICANT CHANGE UP (ref 80–100)
MCV RBC AUTO: 99.4 FL — SIGNIFICANT CHANGE UP (ref 80–100)
PHOSPHATE SERPL-MCNC: 2.7 MG/DL — SIGNIFICANT CHANGE UP (ref 2.4–4.7)
PLATELET # BLD AUTO: 175 K/UL — SIGNIFICANT CHANGE UP (ref 150–400)
PLATELET # BLD AUTO: 180 K/UL — SIGNIFICANT CHANGE UP (ref 150–400)
POTASSIUM SERPL-MCNC: 3.2 MMOL/L — LOW (ref 3.5–5.3)
POTASSIUM SERPL-MCNC: 4.2 MMOL/L — SIGNIFICANT CHANGE UP (ref 3.5–5.3)
POTASSIUM SERPL-SCNC: 3.2 MMOL/L — LOW (ref 3.5–5.3)
POTASSIUM SERPL-SCNC: 4.2 MMOL/L — SIGNIFICANT CHANGE UP (ref 3.5–5.3)
PROCALCITONIN SERPL-MCNC: 0.9 NG/ML — HIGH (ref 0.02–0.1)
RBC # BLD: 3.29 M/UL — LOW (ref 4.2–5.8)
RBC # BLD: 3.34 M/UL — LOW (ref 4.2–5.8)
RBC # FLD: 11.4 % — SIGNIFICANT CHANGE UP (ref 10.3–14.5)
RBC # FLD: 11.5 % — SIGNIFICANT CHANGE UP (ref 10.3–14.5)
SODIUM SERPL-SCNC: 131 MMOL/L — LOW (ref 135–145)
SODIUM SERPL-SCNC: 132 MMOL/L — LOW (ref 135–145)
WBC # BLD: 8.6 K/UL — SIGNIFICANT CHANGE UP (ref 3.8–10.5)
WBC # BLD: 8.74 K/UL — SIGNIFICANT CHANGE UP (ref 3.8–10.5)
WBC # FLD AUTO: 8.6 K/UL — SIGNIFICANT CHANGE UP (ref 3.8–10.5)
WBC # FLD AUTO: 8.74 K/UL — SIGNIFICANT CHANGE UP (ref 3.8–10.5)

## 2020-01-04 PROCEDURE — 71045 X-RAY EXAM CHEST 1 VIEW: CPT | Mod: 26

## 2020-01-04 PROCEDURE — 99232 SBSQ HOSP IP/OBS MODERATE 35: CPT

## 2020-01-04 RX ORDER — POTASSIUM CHLORIDE 20 MEQ
40 PACKET (EA) ORAL EVERY 4 HOURS
Refills: 0 | Status: COMPLETED | OUTPATIENT
Start: 2020-01-04 | End: 2020-01-04

## 2020-01-04 RX ORDER — ACETAMINOPHEN 500 MG
650 TABLET ORAL ONCE
Refills: 0 | Status: COMPLETED | OUTPATIENT
Start: 2020-01-04 | End: 2020-01-04

## 2020-01-04 RX ORDER — IPRATROPIUM/ALBUTEROL SULFATE 18-103MCG
3 AEROSOL WITH ADAPTER (GRAM) INHALATION ONCE
Refills: 0 | Status: COMPLETED | OUTPATIENT
Start: 2020-01-04 | End: 2020-01-04

## 2020-01-04 RX ORDER — MAGNESIUM SULFATE 500 MG/ML
2 VIAL (ML) INJECTION ONCE
Refills: 0 | Status: COMPLETED | OUTPATIENT
Start: 2020-01-04 | End: 2020-01-04

## 2020-01-04 RX ORDER — ACETAMINOPHEN 500 MG
650 TABLET ORAL EVERY 6 HOURS
Refills: 0 | Status: DISCONTINUED | OUTPATIENT
Start: 2020-01-04 | End: 2020-01-07

## 2020-01-04 RX ORDER — IPRATROPIUM/ALBUTEROL SULFATE 18-103MCG
3 AEROSOL WITH ADAPTER (GRAM) INHALATION EVERY 6 HOURS
Refills: 0 | Status: COMPLETED | OUTPATIENT
Start: 2020-01-04 | End: 2020-01-05

## 2020-01-04 RX ORDER — SODIUM CHLORIDE 9 MG/ML
1000 INJECTION INTRAMUSCULAR; INTRAVENOUS; SUBCUTANEOUS
Refills: 0 | Status: COMPLETED | OUTPATIENT
Start: 2020-01-04 | End: 2020-01-04

## 2020-01-04 RX ORDER — MAGNESIUM SULFATE 500 MG/ML
1 VIAL (ML) INJECTION ONCE
Refills: 0 | Status: COMPLETED | OUTPATIENT
Start: 2020-01-04 | End: 2020-01-04

## 2020-01-04 RX ADMIN — INSULIN GLARGINE 20 UNIT(S): 100 INJECTION, SOLUTION SUBCUTANEOUS at 09:54

## 2020-01-04 RX ADMIN — Medication 3 UNIT(S): at 22:10

## 2020-01-04 RX ADMIN — Medication 4: at 16:28

## 2020-01-04 RX ADMIN — Medication 100 GRAM(S): at 22:09

## 2020-01-04 RX ADMIN — Medication 40 MILLIEQUIVALENT(S): at 16:27

## 2020-01-04 RX ADMIN — BENZOCAINE AND MENTHOL 1 LOZENGE: 5; 1 LIQUID ORAL at 03:37

## 2020-01-04 RX ADMIN — Medication 3 MILLILITER(S): at 20:29

## 2020-01-04 RX ADMIN — Medication 3 UNIT(S): at 11:55

## 2020-01-04 RX ADMIN — BENZOCAINE AND MENTHOL 1 LOZENGE: 5; 1 LIQUID ORAL at 16:27

## 2020-01-04 RX ADMIN — Medication 40 MILLIEQUIVALENT(S): at 14:54

## 2020-01-04 RX ADMIN — Medication 3 UNIT(S): at 16:28

## 2020-01-04 RX ADMIN — Medication 4: at 22:09

## 2020-01-04 RX ADMIN — Medication 1 TABLET(S): at 14:53

## 2020-01-04 RX ADMIN — Medication 650 MILLIGRAM(S): at 08:40

## 2020-01-04 RX ADMIN — BENZOCAINE AND MENTHOL 1 LOZENGE: 5; 1 LIQUID ORAL at 05:32

## 2020-01-04 RX ADMIN — BENZOCAINE AND MENTHOL 1 LOZENGE: 5; 1 LIQUID ORAL at 22:09

## 2020-01-04 RX ADMIN — SODIUM CHLORIDE 75 MILLILITER(S): 9 INJECTION INTRAMUSCULAR; INTRAVENOUS; SUBCUTANEOUS at 11:23

## 2020-01-04 RX ADMIN — Medication 1 MILLIGRAM(S): at 14:54

## 2020-01-04 RX ADMIN — Medication 3 MILLILITER(S): at 13:43

## 2020-01-04 RX ADMIN — Medication 3 UNIT(S): at 08:08

## 2020-01-04 RX ADMIN — Medication 75 MILLIGRAM(S): at 05:31

## 2020-01-04 RX ADMIN — Medication 50 GRAM(S): at 14:53

## 2020-01-04 RX ADMIN — Medication 100 MILLIGRAM(S): at 14:53

## 2020-01-04 RX ADMIN — Medication 75 MILLIGRAM(S): at 16:29

## 2020-01-04 NOTE — PROGRESS NOTE ADULT - SUBJECTIVE AND OBJECTIVE BOX
CHIEF COMPLAINT/INTERVAL HISTORY:    Patient is a 24y old  Male who presents with a chief complaint of DKA (2020 13:53)      HPI:  25 y/o male with pmhx of DM I, etoh abuse (last drink 2 days ago, drinks 3-4 beers daily, was sober x 1.5 years until recently presents with nausea/vomiting x 1 day and SOB found to be in severe DKA with AG of > 35, , pH 6.85. Patient states that he has been noncompliant on his meds. Has also been around his two children who have been sick with viral illnesses. Denies chest pain, abdominal pain. Afebrile with WBC 40. (2020 18:00)    SUBJECTIVE & OBJECTIVE: Pt seen and examined at bedside. No overnight events. Patient complaining of fatigue. Febrile this morning; wheezing on exam. CXR repeated. Replace lytes and repeat BMP this afternoon.    ROS: No chest pain, palpitations, SOB, light headedness, dizziness, headache, nausea/vomiting, fevers/chills, abdominal pain, dysuria or increased urinary frequency.    ICU Vital Signs Last 24 Hrs  T(C): 37.3 (2020 16:07), Max: 38.4 (2020 08:08)  T(F): 99.2 (2020 16:07), Max: 101.2 (2020 08:08)  HR: 105 (2020 16:07) (87 - 106)  BP: 116/73 (2020 16:07) (115/76 - 119/73)  RR: 19 (2020 16:07) (19 - 19)  SpO2: 97% (2020 13:43) (95% - 97%)    MEDICATIONS  (STANDING):  albuterol/ipratropium for Nebulization 3 milliLiter(s) Nebulizer every 6 hours  benzocaine 15 mG/menthol 3.6 mG (Sugar-Free) Lozenge 1 Lozenge Oral every 2 hours  chlorhexidine 2% Cloths 1 Application(s) Topical <User Schedule>  dextrose 5%. 1000 milliLiter(s) (50 mL/Hr) IV Continuous <Continuous>  dextrose 50% Injectable 12.5 Gram(s) IV Push once  dextrose 50% Injectable 25 Gram(s) IV Push once  dextrose 50% Injectable 25 Gram(s) IV Push once  folic acid 1 milliGRAM(s) Oral daily  insulin glargine Injectable (LANTUS) 20 Unit(s) SubCutaneous every morning  insulin lispro (HumaLOG) corrective regimen sliding scale   SubCutaneous Before meals and at bedtime  insulin lispro Injectable (HumaLOG) 3 Unit(s) SubCutaneous Before meals and at bedtime  multivitamin 1 Tablet(s) Oral daily  oseltamivir 75 milliGRAM(s) Oral two times a day  thiamine 100 milliGRAM(s) Oral daily    MEDICATIONS  (PRN):  acetaminophen   Tablet .. 650 milliGRAM(s) Oral every 6 hours PRN Temp greater or equal to 38C (100.4F), Mild Pain (1 - 3)  dextrose 40% Gel 15 Gram(s) Oral once PRN Blood Glucose LESS THAN 70 milliGRAM(s)/deciliter  glucagon  Injectable 1 milliGRAM(s) IntraMuscular once PRN Glucose LESS THAN 70 milligrams/deciliter      LABS:                        11.1   8.60  )-----------( 180      ( 2020 09:45 )             32.3     01-04    132<L>  |  94<L>  |  6.0<L>  ----------------------------<  126<H>  3.2<L>   |  24.0  |  0.36<L>    Ca    9.4      2020 09:45  Phos  2.6     01-03  Mg     1.4     01-04    TPro  6.0<L>  /  Alb  3.4  /  TBili  <0.2<L>  /  DBili  x   /  AST  25  /  ALT  12  /  AlkPhos  118  01-03      Urinalysis Basic - ( 2020 19:35 )    Color: Yellow / Appearance: Clear / S.020 / pH: x  Gluc: x / Ketone: Large  / Bili: Negative / Urobili: Negative mg/dL   Blood: x / Protein: 100 mg/dL / Nitrite: Negative   Leuk Esterase: Negative / RBC: 0-2 /HPF / WBC 3-5   Sq Epi: x / Non Sq Epi: Occasional / Bacteria: Occasional        CAPILLARY BLOOD GLUCOSE      POCT Blood Glucose.: 218 mg/dL (2020 16:25)  POCT Blood Glucose.: 135 mg/dL (2020 11:27)  POCT Blood Glucose.: 141 mg/dL (2020 09:53)  POCT Blood Glucose.: 116 mg/dL (2020 08:06)  POCT Blood Glucose.: 258 mg/dL (2020 21:19)      RECENT CULTURES:      RADIOLOGY & ADDITIONAL TESTS:      PHYSICAL EXAM:    GENERAL: young male patient, laying in bed, NAD, frail  HEAD:  Atraumatic, Normocephalic  EYES: EOMI, PERRLA, conjunctiva and sclera clear  ENMT: Moist mucous membranes  NECK: Supple, No JVD  NERVOUS SYSTEM:  Alert & Oriented X3  CHEST/LUNG: Clear to auscultation bilaterally; No rales, rhonchi, wheezing, or rubs  HEART: Regular rate and rhythm; No murmurs, rubs, or gallops  ABDOMEN: Soft, Nontender, Nondistended; Bowel sounds present  EXTREMITIES:  2+ Peripheral Pulses, No clubbing, cyanosis, or edema

## 2020-01-04 NOTE — PROGRESS NOTE ADULT - ASSESSMENT
23 y/o male with PMHx of DM I, ETOh abuse (last drink 2 days ago, drinks 3-4 beers daily, was sober x 1.5 years until recently presents with nausea/vomiting x 1 day and SOB found to be in severe DKA with AG of > 35, , pH 6.85.  Patient states that he has been noncompliant on his meds. Found to be influenza B positive. DKA has resolved and on day 3 of Tamiflu for flu.     1) DKA 2/2 non-compliant Type 1 DM  - Continue Lantus 20 units every morning  - ISS before meals and at bedtime  - Humalog 3 units before meals and at bedtime  - ADA diet    2) Influenza B  - droplet precautions  - febrile today; resolved with tylenol  - Tamiflu day 3 of 5  - supportive care  - add bronchodilators x 24 hours given wheezing  - repeat CXR with RLL atelectasis; unchanged    3) Leukocytosis - resolved  - can be from flu and reactive as well  - greatly improved from 40K now 8K  - blood cultures no growth todate    4) Alcohol Abuse  - continue thiamine, folic acid, and MV  - social work consult    5) Hypokalemia, Hypomagnesemia  -replete aggressively and repeat BMP this evening    DVT ppx - SCDs, early ambulation    Dispo - Discharge home in 24 hours if patient remains afebrile and electrolytes are WNL on 1/5.

## 2020-01-05 LAB
ANION GAP SERPL CALC-SCNC: 20 MMOL/L — HIGH (ref 5–17)
BUN SERPL-MCNC: 9 MG/DL — SIGNIFICANT CHANGE UP (ref 8–20)
CALCIUM SERPL-MCNC: 9.4 MG/DL — SIGNIFICANT CHANGE UP (ref 8.6–10.2)
CHLORIDE SERPL-SCNC: 88 MMOL/L — LOW (ref 98–107)
CO2 SERPL-SCNC: 24 MMOL/L — SIGNIFICANT CHANGE UP (ref 22–29)
CREAT SERPL-MCNC: 0.56 MG/DL — SIGNIFICANT CHANGE UP (ref 0.5–1.3)
GLUCOSE BLDC GLUCOMTR-MCNC: 199 MG/DL — HIGH (ref 70–99)
GLUCOSE BLDC GLUCOMTR-MCNC: 223 MG/DL — HIGH (ref 70–99)
GLUCOSE BLDC GLUCOMTR-MCNC: 244 MG/DL — HIGH (ref 70–99)
GLUCOSE BLDC GLUCOMTR-MCNC: 287 MG/DL — HIGH (ref 70–99)
GLUCOSE SERPL-MCNC: 286 MG/DL — HIGH (ref 70–115)
POTASSIUM SERPL-MCNC: 4.5 MMOL/L — SIGNIFICANT CHANGE UP (ref 3.5–5.3)
POTASSIUM SERPL-SCNC: 4.5 MMOL/L — SIGNIFICANT CHANGE UP (ref 3.5–5.3)
SODIUM SERPL-SCNC: 132 MMOL/L — LOW (ref 135–145)

## 2020-01-05 PROCEDURE — 99232 SBSQ HOSP IP/OBS MODERATE 35: CPT

## 2020-01-05 RX ORDER — INSULIN LISPRO 100/ML
5 VIAL (ML) SUBCUTANEOUS
Refills: 0 | Status: DISCONTINUED | OUTPATIENT
Start: 2020-01-05 | End: 2020-01-06

## 2020-01-05 RX ADMIN — Medication 5 UNIT(S): at 16:26

## 2020-01-05 RX ADMIN — BENZOCAINE AND MENTHOL 1 LOZENGE: 5; 1 LIQUID ORAL at 16:25

## 2020-01-05 RX ADMIN — Medication 4: at 16:26

## 2020-01-05 RX ADMIN — Medication 75 MILLIGRAM(S): at 17:21

## 2020-01-05 RX ADMIN — BENZOCAINE AND MENTHOL 1 LOZENGE: 5; 1 LIQUID ORAL at 22:20

## 2020-01-05 RX ADMIN — Medication 4: at 12:36

## 2020-01-05 RX ADMIN — Medication 3 MILLILITER(S): at 09:29

## 2020-01-05 RX ADMIN — Medication 1 TABLET(S): at 12:38

## 2020-01-05 RX ADMIN — BENZOCAINE AND MENTHOL 1 LOZENGE: 5; 1 LIQUID ORAL at 07:43

## 2020-01-05 RX ADMIN — INSULIN GLARGINE 20 UNIT(S): 100 INJECTION, SOLUTION SUBCUTANEOUS at 07:44

## 2020-01-05 RX ADMIN — Medication 5 UNIT(S): at 12:36

## 2020-01-05 RX ADMIN — Medication 75 MILLIGRAM(S): at 05:59

## 2020-01-05 RX ADMIN — Medication 6: at 07:43

## 2020-01-05 RX ADMIN — BENZOCAINE AND MENTHOL 1 LOZENGE: 5; 1 LIQUID ORAL at 17:21

## 2020-01-05 RX ADMIN — BENZOCAINE AND MENTHOL 1 LOZENGE: 5; 1 LIQUID ORAL at 12:36

## 2020-01-05 RX ADMIN — Medication 3 MILLILITER(S): at 04:36

## 2020-01-05 RX ADMIN — Medication 2: at 22:20

## 2020-01-05 RX ADMIN — Medication 100 MILLIGRAM(S): at 12:39

## 2020-01-05 RX ADMIN — Medication 1 MILLIGRAM(S): at 12:38

## 2020-01-05 RX ADMIN — Medication 3 UNIT(S): at 07:43

## 2020-01-05 NOTE — PROGRESS NOTE ADULT - ASSESSMENT
25 y/o male with PMHx of DM I, ETOh abuse (last drink 2 days ago, drinks 3-4 beers daily, was sober x 1.5 years until recently presents with nausea/vomiting x 1 day and SOB found to be in severe DKA with AG of > 35, , pH 6.85.  Patient states that he has been noncompliant on his meds. Found to be influenza B positive. DKA has resolved and on day 3 of Tamiflu for flu.     Assessment/Plan:    1) DKA 2/2 non-compliant Type 1 DM  - Continue Lantus 20 units every morning  - ISS before meals and at bedtime  - Increase humalog to 5 units TID with meals   - ADA diet    2) Influenza B  - Tamiflu day 4 of 5  - supportive care  - repeat CXR with RLL atelectasis; unchanged    3) Leukocytosis - resolved  - can be from flu and reactive as well  - greatly improved from 40K now 8K  - blood cultures no growth to date    4) Alcohol Abuse  - continue thiamine, folic acid, and MV  - social work consult    5) Hypokalemia, Hypomagnesemia  -replete aggressively and repeat BMP this evening    DVT ppx - SCDs, early ambulation

## 2020-01-05 NOTE — PROGRESS NOTE ADULT - SUBJECTIVE AND OBJECTIVE BOX
CC: Follow up    INTERVAL HPI/OVERNIGHT EVENTS: Patient seen and examined, afebrile now. Denies sob, chest pain or cough.       Vital Signs Last 24 Hrs  T(C): 37.7 (05 Jan 2020 10:00), Max: 37.7 (05 Jan 2020 10:00)  T(F): 99.8 (05 Jan 2020 10:00), Max: 99.8 (05 Jan 2020 10:00)  HR: 99 (05 Jan 2020 10:00) (78 - 105)  BP: 100/59 (05 Jan 2020 10:00) (100/59 - 116/73)  BP(mean): --  RR: 18 (05 Jan 2020 10:00) (18 - 19)  SpO2: 96% (05 Jan 2020 10:00) (96% - 99%)    PHYSICAL EXAM:    GENERAL: NAD, AOX3  HEAD:  Atraumatic, Normocephalic  ENMT: Moist mucous membranes  CHEST/LUNG: Clear to auscultation bilaterally; No rales, rhonchi, wheezing, or rubs  HEART: Regular rate and rhythm; No murmurs, rubs, or gallops  ABDOMEN: Soft, Nontender, Nondistended; Bowel sounds present  EXTREMITIES:  2+ Peripheral Pulses, No clubbing, cyanosis, or edema        MEDICATIONS  (STANDING):  benzocaine 15 mG/menthol 3.6 mG (Sugar-Free) Lozenge 1 Lozenge Oral every 2 hours  dextrose 5%. 1000 milliLiter(s) (50 mL/Hr) IV Continuous <Continuous>  dextrose 50% Injectable 12.5 Gram(s) IV Push once  dextrose 50% Injectable 25 Gram(s) IV Push once  dextrose 50% Injectable 25 Gram(s) IV Push once  folic acid 1 milliGRAM(s) Oral daily  insulin glargine Injectable (LANTUS) 20 Unit(s) SubCutaneous every morning  insulin lispro (HumaLOG) corrective regimen sliding scale   SubCutaneous Before meals and at bedtime  insulin lispro Injectable (HumaLOG) 5 Unit(s) SubCutaneous three times a day before meals  multivitamin 1 Tablet(s) Oral daily  oseltamivir 75 milliGRAM(s) Oral two times a day  thiamine 100 milliGRAM(s) Oral daily    MEDICATIONS  (PRN):  acetaminophen   Tablet .. 650 milliGRAM(s) Oral every 6 hours PRN Temp greater or equal to 38C (100.4F), Mild Pain (1 - 3)  dextrose 40% Gel 15 Gram(s) Oral once PRN Blood Glucose LESS THAN 70 milliGRAM(s)/deciliter  glucagon  Injectable 1 milliGRAM(s) IntraMuscular once PRN Glucose LESS THAN 70 milligrams/deciliter      Allergies    penicillin (Hives)    Intolerances          LABS:                          11.3   8.74  )-----------( 175      ( 04 Jan 2020 19:01 )             33.2     01-05    132<L>  |  88<L>  |  9.0  ----------------------------<  286<H>  4.5   |  24.0  |  0.56    Ca    9.4      05 Jan 2020 09:14  Phos  2.7     01-04  Mg     1.8     01-04            RADIOLOGY & ADDITIONAL TESTS:

## 2020-01-06 ENCOUNTER — TRANSCRIPTION ENCOUNTER (OUTPATIENT)
Age: 25
End: 2020-01-06

## 2020-01-06 LAB
ANION GAP SERPL CALC-SCNC: 18 MMOL/L — HIGH (ref 5–17)
BUN SERPL-MCNC: 11 MG/DL — SIGNIFICANT CHANGE UP (ref 8–20)
CALCIUM SERPL-MCNC: 9.6 MG/DL — SIGNIFICANT CHANGE UP (ref 8.6–10.2)
CHLORIDE SERPL-SCNC: 87 MMOL/L — LOW (ref 98–107)
CO2 SERPL-SCNC: 24 MMOL/L — SIGNIFICANT CHANGE UP (ref 22–29)
CREAT SERPL-MCNC: 0.49 MG/DL — LOW (ref 0.5–1.3)
GLUCOSE BLDC GLUCOMTR-MCNC: 197 MG/DL — HIGH (ref 70–99)
GLUCOSE BLDC GLUCOMTR-MCNC: 200 MG/DL — HIGH (ref 70–99)
GLUCOSE BLDC GLUCOMTR-MCNC: 234 MG/DL — HIGH (ref 70–99)
GLUCOSE BLDC GLUCOMTR-MCNC: 298 MG/DL — HIGH (ref 70–99)
GLUCOSE SERPL-MCNC: 306 MG/DL — HIGH (ref 70–115)
HCT VFR BLD CALC: 39 % — SIGNIFICANT CHANGE UP (ref 39–50)
HGB BLD-MCNC: 13 G/DL — SIGNIFICANT CHANGE UP (ref 13–17)
MCHC RBC-ENTMCNC: 33.2 PG — SIGNIFICANT CHANGE UP (ref 27–34)
MCHC RBC-ENTMCNC: 33.3 GM/DL — SIGNIFICANT CHANGE UP (ref 32–36)
MCV RBC AUTO: 99.5 FL — SIGNIFICANT CHANGE UP (ref 80–100)
PLATELET # BLD AUTO: 155 K/UL — SIGNIFICANT CHANGE UP (ref 150–400)
POTASSIUM SERPL-MCNC: 4.5 MMOL/L — SIGNIFICANT CHANGE UP (ref 3.5–5.3)
POTASSIUM SERPL-SCNC: 4.5 MMOL/L — SIGNIFICANT CHANGE UP (ref 3.5–5.3)
RBC # BLD: 3.92 M/UL — LOW (ref 4.2–5.8)
RBC # FLD: 11.5 % — SIGNIFICANT CHANGE UP (ref 10.3–14.5)
SODIUM SERPL-SCNC: 129 MMOL/L — LOW (ref 135–145)
WBC # BLD: 10.89 K/UL — HIGH (ref 3.8–10.5)
WBC # FLD AUTO: 10.89 K/UL — HIGH (ref 3.8–10.5)

## 2020-01-06 PROCEDURE — 99232 SBSQ HOSP IP/OBS MODERATE 35: CPT

## 2020-01-06 RX ORDER — INSULIN LISPRO 100/ML
8 VIAL (ML) SUBCUTANEOUS
Refills: 0 | Status: DISCONTINUED | OUTPATIENT
Start: 2020-01-06 | End: 2020-01-07

## 2020-01-06 RX ORDER — INSULIN LISPRO 100/ML
5 VIAL (ML) SUBCUTANEOUS
Qty: 1500 | Refills: 0
Start: 2020-01-06 | End: 2020-02-04

## 2020-01-06 RX ORDER — INSULIN GLARGINE 100 [IU]/ML
25 INJECTION, SOLUTION SUBCUTANEOUS EVERY MORNING
Refills: 0 | Status: DISCONTINUED | OUTPATIENT
Start: 2020-01-06 | End: 2020-01-07

## 2020-01-06 RX ORDER — SODIUM CHLORIDE 9 MG/ML
1000 INJECTION INTRAMUSCULAR; INTRAVENOUS; SUBCUTANEOUS
Refills: 0 | Status: DISCONTINUED | OUTPATIENT
Start: 2020-01-06 | End: 2020-01-07

## 2020-01-06 RX ORDER — INSULIN GLARGINE 100 [IU]/ML
20 INJECTION, SOLUTION SUBCUTANEOUS
Qty: 2 | Refills: 1
Start: 2020-01-06

## 2020-01-06 RX ADMIN — Medication 1 MILLIGRAM(S): at 11:55

## 2020-01-06 RX ADMIN — Medication 75 MILLIGRAM(S): at 05:45

## 2020-01-06 RX ADMIN — BENZOCAINE AND MENTHOL 1 LOZENGE: 5; 1 LIQUID ORAL at 16:54

## 2020-01-06 RX ADMIN — Medication 75 MILLIGRAM(S): at 17:27

## 2020-01-06 RX ADMIN — BENZOCAINE AND MENTHOL 1 LOZENGE: 5; 1 LIQUID ORAL at 22:03

## 2020-01-06 RX ADMIN — BENZOCAINE AND MENTHOL 1 LOZENGE: 5; 1 LIQUID ORAL at 23:49

## 2020-01-06 RX ADMIN — Medication 650 MILLIGRAM(S): at 08:56

## 2020-01-06 RX ADMIN — SODIUM CHLORIDE 75 MILLILITER(S): 9 INJECTION INTRAMUSCULAR; INTRAVENOUS; SUBCUTANEOUS at 12:34

## 2020-01-06 RX ADMIN — BENZOCAINE AND MENTHOL 1 LOZENGE: 5; 1 LIQUID ORAL at 14:15

## 2020-01-06 RX ADMIN — Medication 8 UNIT(S): at 16:54

## 2020-01-06 RX ADMIN — BENZOCAINE AND MENTHOL 1 LOZENGE: 5; 1 LIQUID ORAL at 11:54

## 2020-01-06 RX ADMIN — Medication 4: at 16:54

## 2020-01-06 RX ADMIN — Medication 8 UNIT(S): at 11:53

## 2020-01-06 RX ADMIN — Medication 5 UNIT(S): at 07:50

## 2020-01-06 RX ADMIN — BENZOCAINE AND MENTHOL 1 LOZENGE: 5; 1 LIQUID ORAL at 19:57

## 2020-01-06 RX ADMIN — BENZOCAINE AND MENTHOL 1 LOZENGE: 5; 1 LIQUID ORAL at 17:27

## 2020-01-06 RX ADMIN — Medication 2: at 11:53

## 2020-01-06 RX ADMIN — BENZOCAINE AND MENTHOL 1 LOZENGE: 5; 1 LIQUID ORAL at 07:54

## 2020-01-06 RX ADMIN — SODIUM CHLORIDE 75 MILLILITER(S): 9 INJECTION INTRAMUSCULAR; INTRAVENOUS; SUBCUTANEOUS at 17:27

## 2020-01-06 RX ADMIN — Medication 650 MILLIGRAM(S): at 07:58

## 2020-01-06 RX ADMIN — INSULIN GLARGINE 20 UNIT(S): 100 INJECTION, SOLUTION SUBCUTANEOUS at 07:53

## 2020-01-06 RX ADMIN — BENZOCAINE AND MENTHOL 1 LOZENGE: 5; 1 LIQUID ORAL at 05:45

## 2020-01-06 RX ADMIN — Medication 2: at 22:24

## 2020-01-06 RX ADMIN — Medication 1 TABLET(S): at 11:54

## 2020-01-06 RX ADMIN — Medication 6: at 07:49

## 2020-01-06 RX ADMIN — Medication 100 MILLIGRAM(S): at 11:54

## 2020-01-06 NOTE — CHART NOTE - NSCHARTNOTEFT_GEN_A_CORE
Source: Patient [ x]  Family [ ]   other [ ]    Current Diet: Diet, Consistent Carbohydrate w/Evening Snack (01-03-20 @ 11:15)    PO intake:  Pt reported good po intake, consuming 100% of breakfast per tray observation    Current Weight:   (1/3) 125# reported wt  -Obtain current weight, continue to monitor.     Pertinent Medications: MEDICATIONS  (STANDING):  benzocaine 15 mG/menthol 3.6 mG (Sugar-Free) Lozenge 1 Lozenge Oral every 2 hours  dextrose 5%. 1000 milliLiter(s) (50 mL/Hr) IV Continuous <Continuous>  dextrose 50% Injectable 12.5 Gram(s) IV Push once  dextrose 50% Injectable 25 Gram(s) IV Push once  dextrose 50% Injectable 25 Gram(s) IV Push once  folic acid 1 milliGRAM(s) Oral daily  insulin glargine Injectable (LANTUS) 25 Unit(s) SubCutaneous every morning  insulin lispro (HumaLOG) corrective regimen sliding scale   SubCutaneous Before meals and at bedtime  insulin lispro Injectable (HumaLOG) 8 Unit(s) SubCutaneous three times a day before meals  multivitamin 1 Tablet(s) Oral daily  oseltamivir 75 milliGRAM(s) Oral two times a day  sodium chloride 0.9%. 1000 milliLiter(s) (75 mL/Hr) IV Continuous <Continuous>  thiamine 100 milliGRAM(s) Oral daily    MEDICATIONS  (PRN):  acetaminophen   Tablet .. 650 milliGRAM(s) Oral every 6 hours PRN Temp greater or equal to 38C (100.4F), Mild Pain (1 - 3)  dextrose 40% Gel 15 Gram(s) Oral once PRN Blood Glucose LESS THAN 70 milliGRAM(s)/deciliter  glucagon  Injectable 1 milliGRAM(s) IntraMuscular once PRN Glucose LESS THAN 70 milligrams/deciliter    Pertinent Labs: CBC Full  -  ( 06 Jan 2020 08:29 )  WBC Count : 10.89 K/uL  RBC Count : 3.92 M/uL  Hemoglobin : 13.0 g/dL  Hematocrit : 39.0 %  Platelet Count - Automated : 155 K/uL  Mean Cell Volume : 99.5 fl  Mean Cell Hemoglobin : 33.2 pg  Mean Cell Hemoglobin Concentration : 33.3 gm/dL  Auto Neutrophil # : x  Auto Lymphocyte # : x  Auto Monocyte # : x  Auto Eosinophil # : x  Auto Basophil # : x  Auto Neutrophil % : x  Auto Lymphocyte % : x  Auto Monocyte % : x  Auto Eosinophil % : x  Auto Basophil % : x  01-06 Na129 mmol/L<L> Glu 306 mg/dL<H> K+ 4.5 mmol/L Cr  0.49 mg/dL<L> BUN 11.0 mg/dL Phos n/a   Alb n/a   PAB n/a       Skin: Intact    Nutrition focused physical exam previously conducted - found signs of malnutrition [ ]absent [ x]present    Subcutaneous fat loss: [x ] Orbital fat pads region, [ x]Buccal fat region, [ ]Triceps region,  [ ]Ribs region    Muscle wasting: [ x]Temples region, [x ]Clavicle region, [ x]Shoulder region, [ ]Scapula region, [ ]Interosseous region,  [ ]thigh region, [ ]Calf region    Estimated Needs:   [x ] no change since previous assessment  [ ] recalculated:     Current Nutrition Diagnosis: Pt remains at high nutrition risk and meets criteria for severe (chronic) malnutrition related to uncontrolled DM/medication non-compliance and inability to meet sufficient needs secondary to alcohol abuse and +Flu as evidenced by severe muscle wasting and fat loss and meeting <50% nutrient needs >5 days. Pt encouraged to consume HBV protein. Pt reported no current nutrition related questions or concerns. RD to remain available.     Recommendations:   1) RX: Add glucerna TID to optimize po intake and provide an additional 220kcal, 10g protein per serving   2) Encourage po intake and HBV protein  3) Monitor weights and labs    Monitoring and Evaluation:   [x ] PO intake [x ] Tolerance to diet prescription [X] Weights  [X] Follow up per protocol [X] Labs:

## 2020-01-06 NOTE — PROGRESS NOTE ADULT - ASSESSMENT
23 y/o male with PMHx of DM I, ETOh abuse (last drink 2 days ago, drinks 3-4 beers daily, was sober x 1.5 years until recently presents with nausea/vomiting x 1 day and SOB found to be in severe DKA with AG of > 35, , pH 6.85.  Patient states that he has been noncompliant on his meds. Found to be influenza B positive. DKA has resolved and on day 3 of Tamiflu for flu.     Assessment/Plan:    1. DKA 2/2 non-compliant Type 1 DM: Increased blood glucose this morning  Increase lantus to 25 units  Increase pre-meal to 8 units TID with meals  Monitor BSL    2. Influenza B- Increased WBC this morning with Temp of 100.4  Continue tamiflu  Monitor temperature curve    3. Hyponatremia: NS at 75ml/hr for today  Monitor BMP     4. Alcohol Abuse  - continue thiamine, folic acid, and MV  - social work consult      DVT ppx - SCDs, early ambulation

## 2020-01-06 NOTE — DISCHARGE NOTE PROVIDER - NSDCCPCAREPLAN_GEN_ALL_CORE_FT
PRINCIPAL DISCHARGE DIAGNOSIS  Diagnosis: DKA (diabetic ketoacidoses)  Assessment and Plan of Treatment: Resolved  Counselled on the importance of medication complaince  Continue 20 units of Lantus at bedtime  Continue 5 units of Humalog TID with meals  Follow up with endocrine Dr Nolasco in 1 week      SECONDARY DISCHARGE DIAGNOSES  Diagnosis: Influenza  Assessment and Plan of Treatment: Complete Tamiflu as prescribed PRINCIPAL DISCHARGE DIAGNOSIS  Diagnosis: DKA (diabetic ketoacidoses)  Assessment and Plan of Treatment: Resolved  Counselled on the importance of medication complaince  Continue 30 units of Lantus at bedtime  Continue 8 units of Humalog TID with meals  Follow up with endocrine Dr Nolasco in 1 week      SECONDARY DISCHARGE DIAGNOSES  Diagnosis: Influenza  Assessment and Plan of Treatment: Complete Tamiflu as prescribed

## 2020-01-06 NOTE — PROGRESS NOTE ADULT - SUBJECTIVE AND OBJECTIVE BOX
CC: Fever, hyperglycemia     INTERVAL HPI/OVERNIGHT EVENTS: Patient seen and examined, Temp of 100.4 this morning. Cough improving, denies sob or wheezing. No urinary or bowel complaints.       Vital Signs Last 24 Hrs  T(C): 38 (06 Jan 2020 08:00), Max: 38 (06 Jan 2020 08:00)  T(F): 100.4 (06 Jan 2020 08:00), Max: 100.4 (06 Jan 2020 08:00)  HR: 99 (06 Jan 2020 08:00) (92 - 99)  BP: 112/72 (06 Jan 2020 08:00) (110/65 - 112/72)  BP(mean): --  RR: 18 (06 Jan 2020 08:00) (18 - 18)  SpO2: 98% (06 Jan 2020 08:00) (98% - 98%)    PHYSICAL EXAM:    GENERAL: NAD, AOX3  HEAD:  Atraumatic, Normocephalic  EYES: EOMI, PERRLA, conjunctiva and sclera clear  ENMT: Moist mucous membranes  CHEST/LUNG: Clear to auscultation bilaterally; No rales, rhonchi, wheezing, or rubs  HEART: Regular rate and rhythm; No murmurs, rubs, or gallops  ABDOMEN: Soft, Nontender, Nondistended; Bowel sounds present  EXTREMITIES:  2+ Peripheral Pulses, No clubbing, cyanosis, or edema        MEDICATIONS  (STANDING):  benzocaine 15 mG/menthol 3.6 mG (Sugar-Free) Lozenge 1 Lozenge Oral every 2 hours  dextrose 5%. 1000 milliLiter(s) (50 mL/Hr) IV Continuous <Continuous>  dextrose 50% Injectable 12.5 Gram(s) IV Push once  dextrose 50% Injectable 25 Gram(s) IV Push once  dextrose 50% Injectable 25 Gram(s) IV Push once  folic acid 1 milliGRAM(s) Oral daily  insulin glargine Injectable (LANTUS) 25 Unit(s) SubCutaneous every morning  insulin lispro (HumaLOG) corrective regimen sliding scale   SubCutaneous Before meals and at bedtime  insulin lispro Injectable (HumaLOG) 8 Unit(s) SubCutaneous three times a day before meals  multivitamin 1 Tablet(s) Oral daily  oseltamivir 75 milliGRAM(s) Oral two times a day  sodium chloride 0.9%. 1000 milliLiter(s) (75 mL/Hr) IV Continuous <Continuous>  thiamine 100 milliGRAM(s) Oral daily    MEDICATIONS  (PRN):  acetaminophen   Tablet .. 650 milliGRAM(s) Oral every 6 hours PRN Temp greater or equal to 38C (100.4F), Mild Pain (1 - 3)  dextrose 40% Gel 15 Gram(s) Oral once PRN Blood Glucose LESS THAN 70 milliGRAM(s)/deciliter  glucagon  Injectable 1 milliGRAM(s) IntraMuscular once PRN Glucose LESS THAN 70 milligrams/deciliter      Allergies    penicillin (Hives)    Intolerances          LABS:                          13.0   10.89 )-----------( 155      ( 06 Jan 2020 08:29 )             39.0     01-06    129<L>  |  87<L>  |  11.0  ----------------------------<  306<H>  4.5   |  24.0  |  0.49<L>    Ca    9.6      06 Jan 2020 08:29  Phos  2.7     01-04  Mg     1.8     01-04            RADIOLOGY & ADDITIONAL TESTS:

## 2020-01-06 NOTE — DISCHARGE NOTE PROVIDER - CARE PROVIDER_API CALL
Quin Nolasco)  EndocrinologyMetabDiabetes  [address], [place] [id number]  Phone: [phone]  Fax: [phone]  Follow Up Time:

## 2020-01-06 NOTE — DISCHARGE NOTE PROVIDER - HOSPITAL COURSE
23 y/o male with PMHx of DM I, ETOh abuse (last drink 2 days ago, drinks 3-4 beers daily, was sober x 1.5 years until recently presents with nausea/vomiting x 1 day and SOB found to be in severe DKA with AG of > 35, , pH 6.85.  Patient states that he has been noncompliant on his meds. Found to be influenza B positive. DKA has resolved. Treated with 5 days of Tamiflu.     Counselled on the importance of medication compliance and follow up with endocrine.         38 mins spent.

## 2020-01-06 NOTE — DISCHARGE NOTE PROVIDER - NSDCMRMEDTOKEN_GEN_ALL_CORE_FT
Basaglar KwikPen 100 units/mL subcutaneous solution: 20 unit(s) subcutaneous once a day (at bedtime)   HumaLOG KwikPen 100 units/mL injectable solution: 5 unit(s) injectable 3 times a day (before meals)   oseltamivir 75 mg oral capsule: 1 cap(s) orally 2 times a day Basaglar KwikPen 100 units/mL subcutaneous solution: 30 unit(s) subcutaneous once a day (at bedtime)   HumaLOG KwikPen 100 units/mL injectable solution: 8 unit(s) injectable 3 times a day (before meals)   oseltamivir 75 mg oral capsule: 1 cap(s) orally 2 times a day

## 2020-01-07 ENCOUNTER — TRANSCRIPTION ENCOUNTER (OUTPATIENT)
Age: 25
End: 2020-01-07

## 2020-01-07 VITALS
HEART RATE: 83 BPM | SYSTOLIC BLOOD PRESSURE: 103 MMHG | RESPIRATION RATE: 18 BRPM | OXYGEN SATURATION: 98 % | TEMPERATURE: 98 F | DIASTOLIC BLOOD PRESSURE: 63 MMHG

## 2020-01-07 LAB
ANION GAP SERPL CALC-SCNC: 18 MMOL/L — HIGH (ref 5–17)
BUN SERPL-MCNC: 12 MG/DL — SIGNIFICANT CHANGE UP (ref 8–20)
CALCIUM SERPL-MCNC: 9 MG/DL — SIGNIFICANT CHANGE UP (ref 8.6–10.2)
CHLORIDE SERPL-SCNC: 97 MMOL/L — LOW (ref 98–107)
CO2 SERPL-SCNC: 23 MMOL/L — SIGNIFICANT CHANGE UP (ref 22–29)
CREAT SERPL-MCNC: 0.39 MG/DL — LOW (ref 0.5–1.3)
CULTURE RESULTS: SIGNIFICANT CHANGE UP
CULTURE RESULTS: SIGNIFICANT CHANGE UP
GLUCOSE BLDC GLUCOMTR-MCNC: 166 MG/DL — HIGH (ref 70–99)
GLUCOSE BLDC GLUCOMTR-MCNC: 289 MG/DL — HIGH (ref 70–99)
GLUCOSE SERPL-MCNC: 304 MG/DL — HIGH (ref 70–115)
HCT VFR BLD CALC: 35.1 % — LOW (ref 39–50)
HGB BLD-MCNC: 11.9 G/DL — LOW (ref 13–17)
MCHC RBC-ENTMCNC: 33.5 PG — SIGNIFICANT CHANGE UP (ref 27–34)
MCHC RBC-ENTMCNC: 33.9 GM/DL — SIGNIFICANT CHANGE UP (ref 32–36)
MCV RBC AUTO: 98.9 FL — SIGNIFICANT CHANGE UP (ref 80–100)
PLATELET # BLD AUTO: 165 K/UL — SIGNIFICANT CHANGE UP (ref 150–400)
POTASSIUM SERPL-MCNC: 4.4 MMOL/L — SIGNIFICANT CHANGE UP (ref 3.5–5.3)
POTASSIUM SERPL-SCNC: 4.4 MMOL/L — SIGNIFICANT CHANGE UP (ref 3.5–5.3)
RBC # BLD: 3.55 M/UL — LOW (ref 4.2–5.8)
RBC # FLD: 11.2 % — SIGNIFICANT CHANGE UP (ref 10.3–14.5)
SODIUM SERPL-SCNC: 138 MMOL/L — SIGNIFICANT CHANGE UP (ref 135–145)
SPECIMEN SOURCE: SIGNIFICANT CHANGE UP
SPECIMEN SOURCE: SIGNIFICANT CHANGE UP
WBC # BLD: 8.62 K/UL — SIGNIFICANT CHANGE UP (ref 3.8–10.5)
WBC # FLD AUTO: 8.62 K/UL — SIGNIFICANT CHANGE UP (ref 3.8–10.5)

## 2020-01-07 PROCEDURE — 82803 BLOOD GASES ANY COMBINATION: CPT

## 2020-01-07 PROCEDURE — 82435 ASSAY OF BLOOD CHLORIDE: CPT

## 2020-01-07 PROCEDURE — 82009 KETONE BODYS QUAL: CPT

## 2020-01-07 PROCEDURE — 85014 HEMATOCRIT: CPT

## 2020-01-07 PROCEDURE — 83036 HEMOGLOBIN GLYCOSYLATED A1C: CPT

## 2020-01-07 PROCEDURE — 83735 ASSAY OF MAGNESIUM: CPT

## 2020-01-07 PROCEDURE — 83690 ASSAY OF LIPASE: CPT

## 2020-01-07 PROCEDURE — 87040 BLOOD CULTURE FOR BACTERIA: CPT

## 2020-01-07 PROCEDURE — 80053 COMPREHEN METABOLIC PANEL: CPT

## 2020-01-07 PROCEDURE — 85027 COMPLETE CBC AUTOMATED: CPT

## 2020-01-07 PROCEDURE — 36415 COLL VENOUS BLD VENIPUNCTURE: CPT

## 2020-01-07 PROCEDURE — 99285 EMERGENCY DEPT VISIT HI MDM: CPT | Mod: 25

## 2020-01-07 PROCEDURE — 71045 X-RAY EXAM CHEST 1 VIEW: CPT

## 2020-01-07 PROCEDURE — 83605 ASSAY OF LACTIC ACID: CPT

## 2020-01-07 PROCEDURE — 84145 PROCALCITONIN (PCT): CPT

## 2020-01-07 PROCEDURE — 81001 URINALYSIS AUTO W/SCOPE: CPT

## 2020-01-07 PROCEDURE — 96374 THER/PROPH/DIAG INJ IV PUSH: CPT

## 2020-01-07 PROCEDURE — 94640 AIRWAY INHALATION TREATMENT: CPT

## 2020-01-07 PROCEDURE — 99239 HOSP IP/OBS DSCHRG MGMT >30: CPT

## 2020-01-07 PROCEDURE — 82962 GLUCOSE BLOOD TEST: CPT

## 2020-01-07 PROCEDURE — 82330 ASSAY OF CALCIUM: CPT

## 2020-01-07 PROCEDURE — 84295 ASSAY OF SERUM SODIUM: CPT

## 2020-01-07 PROCEDURE — 84100 ASSAY OF PHOSPHORUS: CPT

## 2020-01-07 PROCEDURE — 84132 ASSAY OF SERUM POTASSIUM: CPT

## 2020-01-07 PROCEDURE — 87486 CHLMYD PNEUM DNA AMP PROBE: CPT

## 2020-01-07 PROCEDURE — 93005 ELECTROCARDIOGRAM TRACING: CPT

## 2020-01-07 PROCEDURE — 87633 RESP VIRUS 12-25 TARGETS: CPT

## 2020-01-07 PROCEDURE — 80048 BASIC METABOLIC PNL TOTAL CA: CPT

## 2020-01-07 PROCEDURE — 87798 DETECT AGENT NOS DNA AMP: CPT

## 2020-01-07 PROCEDURE — 87581 M.PNEUMON DNA AMP PROBE: CPT

## 2020-01-07 PROCEDURE — 80307 DRUG TEST PRSMV CHEM ANLYZR: CPT

## 2020-01-07 PROCEDURE — 82947 ASSAY GLUCOSE BLOOD QUANT: CPT

## 2020-01-07 RX ORDER — INSULIN GLARGINE 100 [IU]/ML
30 INJECTION, SOLUTION SUBCUTANEOUS
Qty: 2 | Refills: 1
Start: 2020-01-07

## 2020-01-07 RX ORDER — INSULIN GLARGINE 100 [IU]/ML
30 INJECTION, SOLUTION SUBCUTANEOUS AT BEDTIME
Refills: 0 | Status: DISCONTINUED | OUTPATIENT
Start: 2020-01-07 | End: 2020-01-07

## 2020-01-07 RX ORDER — INSULIN LISPRO 100/ML
8 VIAL (ML) SUBCUTANEOUS
Qty: 2 | Refills: 0
Start: 2020-01-07 | End: 2020-02-05

## 2020-01-07 RX ADMIN — Medication 1 MILLIGRAM(S): at 11:24

## 2020-01-07 RX ADMIN — BENZOCAINE AND MENTHOL 1 LOZENGE: 5; 1 LIQUID ORAL at 04:50

## 2020-01-07 RX ADMIN — BENZOCAINE AND MENTHOL 1 LOZENGE: 5; 1 LIQUID ORAL at 09:06

## 2020-01-07 RX ADMIN — Medication 6: at 07:47

## 2020-01-07 RX ADMIN — BENZOCAINE AND MENTHOL 1 LOZENGE: 5; 1 LIQUID ORAL at 05:33

## 2020-01-07 RX ADMIN — Medication 8 UNIT(S): at 07:48

## 2020-01-07 RX ADMIN — Medication 2: at 11:26

## 2020-01-07 RX ADMIN — SODIUM CHLORIDE 75 MILLILITER(S): 9 INJECTION INTRAMUSCULAR; INTRAVENOUS; SUBCUTANEOUS at 02:00

## 2020-01-07 RX ADMIN — INSULIN GLARGINE 25 UNIT(S): 100 INJECTION, SOLUTION SUBCUTANEOUS at 07:48

## 2020-01-07 RX ADMIN — Medication 8 UNIT(S): at 11:27

## 2020-01-07 RX ADMIN — Medication 75 MILLIGRAM(S): at 05:33

## 2020-01-07 RX ADMIN — Medication 100 MILLIGRAM(S): at 11:24

## 2020-01-07 RX ADMIN — Medication 1 TABLET(S): at 11:24

## 2020-01-07 RX ADMIN — BENZOCAINE AND MENTHOL 1 LOZENGE: 5; 1 LIQUID ORAL at 02:00

## 2020-01-07 RX ADMIN — BENZOCAINE AND MENTHOL 1 LOZENGE: 5; 1 LIQUID ORAL at 11:29

## 2020-01-07 RX ADMIN — Medication 650 MILLIGRAM(S): at 00:49

## 2020-01-07 NOTE — PROGRESS NOTE ADULT - ASSESSMENT
23 y/o male with PMHx of DM I, ETOh abuse (last drink 2 days ago, drinks 3-4 beers daily, was sober x 1.5 years until recently presents with nausea/vomiting x 1 day and SOB found to be in severe DKA with AG of > 35, , pH 6.85.  Patient states that he has been noncompliant on his meds. Found to be influenza B positive. DKA has resolved and on day 3 of Tamiflu for flu.     Assessment/Plan:    1. DKA 2/2 non-compliant Type 1 DM: Increased blood glucose this morning  Increase lantus to 30 units   pre-meal to 8 units TID with meals  Monitor BSL    2. Influenza B- Increased WBC this morning with Temp of 100.8  Leukocytosis resolved  Secondary to influenza  Continue tamiflu    3. Hyponatremia: Resolved wtih iv hydration      4. Alcohol Abuse counselled on cessation    Stable for discharge home

## 2020-01-07 NOTE — DISCHARGE NOTE NURSING/CASE MANAGEMENT/SOCIAL WORK - NSDCPEWEB_GEN_ALL_CORE
[place] website --- [email or web address] Tobacco [email or web address] website --- [email or web address]

## 2020-01-07 NOTE — PROGRESS NOTE ADULT - SUBJECTIVE AND OBJECTIVE BOX
CC: Follow up     INTERVAL HPI/OVERNIGHT EVENTS: Patient seen and examined, denies sob, cough or palpitation> Tmax of 100.4 overnight. No urinary or bowel complaints.       Vital Signs Last 24 Hrs  T(C): 36.3 (07 Jan 2020 07:58), Max: 38.2 (07 Jan 2020 00:00)  T(F): 97.4 (07 Jan 2020 07:58), Max: 100.8 (07 Jan 2020 00:00)  HR: 107 (07 Jan 2020 07:58) (94 - 107)  BP: 115/69 (07 Jan 2020 07:58) (109/64 - 115/69)  BP(mean): --  RR: 17 (07 Jan 2020 07:58) (17 - 18)  SpO2: 90% (07 Jan 2020 07:58) (90% - 90%)    PHYSICAL EXAM:    GENERAL: NAD, AOX3  HEAD:  Atraumatic, Normocephalic  ENMT: Moist mucous membranes  CHEST/LUNG: Clear to auscultation bilaterally; No rales, rhonchi, wheezing, or rubs  HEART: Regular rate and rhythm; No murmurs, rubs, or gallops  ABDOMEN: Soft, Nontender, Nondistended; Bowel sounds present  EXTREMITIES:  2+ Peripheral Pulses, No clubbing, cyanosis, or edema        MEDICATIONS  (STANDING):  benzocaine 15 mG/menthol 3.6 mG (Sugar-Free) Lozenge 1 Lozenge Oral every 2 hours  dextrose 5%. 1000 milliLiter(s) (50 mL/Hr) IV Continuous <Continuous>  dextrose 50% Injectable 12.5 Gram(s) IV Push once  dextrose 50% Injectable 25 Gram(s) IV Push once  dextrose 50% Injectable 25 Gram(s) IV Push once  folic acid 1 milliGRAM(s) Oral daily  insulin glargine Injectable (LANTUS) 30 Unit(s) SubCutaneous at bedtime  insulin lispro (HumaLOG) corrective regimen sliding scale   SubCutaneous Before meals and at bedtime  insulin lispro Injectable (HumaLOG) 8 Unit(s) SubCutaneous three times a day before meals  multivitamin 1 Tablet(s) Oral daily  oseltamivir 75 milliGRAM(s) Oral two times a day  thiamine 100 milliGRAM(s) Oral daily    MEDICATIONS  (PRN):  acetaminophen   Tablet .. 650 milliGRAM(s) Oral every 6 hours PRN Temp greater or equal to 38C (100.4F), Mild Pain (1 - 3)  dextrose 40% Gel 15 Gram(s) Oral once PRN Blood Glucose LESS THAN 70 milliGRAM(s)/deciliter  glucagon  Injectable 1 milliGRAM(s) IntraMuscular once PRN Glucose LESS THAN 70 milligrams/deciliter      Allergies    penicillin (Hives)    Intolerances          LABS:                          11.9   8.62  )-----------( 165      ( 07 Jan 2020 08:23 )             35.1     01-07    138  |  97<L>  |  12.0  ----------------------------<  304<H>  4.4   |  23.0  |  0.39<L>    Ca    9.0      07 Jan 2020 08:23            RADIOLOGY & ADDITIONAL TESTS:

## 2020-01-07 NOTE — DISCHARGE NOTE NURSING/CASE MANAGEMENT/SOCIAL WORK - NSDCPEEMAIL_GEN_ALL_CORE
[facility] for Tobacco Control email [email or web address]

## 2020-01-07 NOTE — DISCHARGE NOTE NURSING/CASE MANAGEMENT/SOCIAL WORK - PATIENT PORTAL LINK FT
You can access the FollowMyHealth Patient Portal offered by [facility] by registering at the following website: [email or web address]. By joining [employer]’s FollowMyHealth portal, you will also be able to view your health information using other applications (apps) compatible with our system.

## 2020-01-15 ENCOUNTER — TRANSCRIPTION ENCOUNTER (OUTPATIENT)
Age: 25
End: 2020-01-15

## 2020-02-01 ENCOUNTER — OUTPATIENT (OUTPATIENT)
Dept: OUTPATIENT SERVICES | Facility: HOSPITAL | Age: 25
LOS: 1 days | End: 2020-02-01
Payer: MEDICAID

## 2020-02-01 PROCEDURE — G9001: CPT

## 2020-02-10 DIAGNOSIS — Z71.89 OTHER SPECIFIED COUNSELING: ICD-10-CM

## 2020-02-13 RX ORDER — BLOOD SUGAR DIAGNOSTIC
STRIP MISCELLANEOUS 4 TIMES DAILY
Qty: 4 | Refills: 1 | Status: ACTIVE | COMMUNITY
Start: 1900-01-01 | End: 1900-01-01

## 2020-02-27 ENCOUNTER — APPOINTMENT (OUTPATIENT)
Dept: ENDOCRINOLOGY | Facility: CLINIC | Age: 25
End: 2020-02-27
Payer: MEDICAID

## 2020-02-27 VITALS
DIASTOLIC BLOOD PRESSURE: 80 MMHG | SYSTOLIC BLOOD PRESSURE: 134 MMHG | OXYGEN SATURATION: 97 % | HEIGHT: 70 IN | WEIGHT: 135 LBS | HEART RATE: 100 BPM | BODY MASS INDEX: 19.33 KG/M2

## 2020-02-27 DIAGNOSIS — Z79.899 OTHER LONG TERM (CURRENT) DRUG THERAPY: ICD-10-CM

## 2020-02-27 LAB — GLUCOSE BLDC GLUCOMTR-MCNC: 386

## 2020-02-27 PROCEDURE — 99214 OFFICE O/P EST MOD 30 MIN: CPT | Mod: 25

## 2020-02-27 PROCEDURE — 82962 GLUCOSE BLOOD TEST: CPT

## 2020-02-27 RX ORDER — INSULIN GLARGINE 100 [IU]/ML
100 INJECTION, SOLUTION SUBCUTANEOUS
Qty: 2 | Refills: 0 | Status: DISCONTINUED | COMMUNITY
End: 2020-02-27

## 2020-02-27 NOTE — ASSESSMENT
[FreeTextEntry1] : T1DM\par -Continue current dose of insulin regimen.\par -Placed a olivier sample today, patient to come back in two weeks for a download to make insulin changes because on recall his BS are not far off normal range yet was in hospital for DKA 6 weeks ago. Patient interested in CGM long term, would likely prescribe Dexcom for the alerts in T1DM. \par -Pt interested to have a conversation about pumps, appointment to be made with CDE for pump conversation.\par -Increase exercise as tolerated, goal of 30 mins a day 5x a week.\par -Would benefit from a conversation about diet as he eats a lot of frozen meals.\par -Labs today in office, will call with results.\par -RTO 2 weeks.

## 2020-02-27 NOTE — PHYSICAL EXAM
[Alert] : alert [Well Nourished] : well nourished [No Acute Distress] : no acute distress [Well Developed] : well developed [Normal Sclera/Conjunctiva] : normal sclera/conjunctiva [Normal Hearing] : hearing was normal [Supple] : the neck was supple [No Accessory Muscle Use] : no accessory muscle use [Normal Rate and Effort] : normal respiratory rhythm and effort [Clear to Auscultation] : lungs were clear to auscultation bilaterally [Normal Rate] : heart rate was normal  [Normal S1, S2] : normal S1 and S2 [Regular Rhythm] : with a regular rhythm [No Edema] : there was no peripheral edema [Soft] : abdomen soft [Not Tender] : non-tender [Post Cervical Nodes] : posterior cervical nodes [Anterior Cervical Nodes] : anterior cervical nodes [Normal] : normal and non tender [Normal Gait] : normal gait [No Rash] : no rash [Acanthosis Nigricans] : no acanthosis nigricans [No Tremors] : no tremors [Oriented x3] : oriented to person, place, and time

## 2020-02-27 NOTE — HISTORY OF PRESENT ILLNESS
[FreeTextEntry1] : Quality: Type 1\par Severity: severe, uncontrolled\par Duration/Onset: 2018 with A1c >16.9% and DKA\par \par Recently in [facility] 1/2020 for DKA \par Pt is an alcoholic and began to start drinking again and ran out of insulin for approx 2 days\par Felt sick, vomiting and blacked out\par On insulin GTT in MICU for 7-8 days\par \par SMBG\par Checks BS at home 4-5x a day\par Fasting 100-120 on recall\par Before meals high 100s-low 200s\par After meals 150\par \par FS in office 386-2 hot pockets\par \par Current drug regimen\par Basaglar 35 units\par Admelog 5 units + scale (1 units for every 50 above 150)\par \par Eye exam: Has not been since 2018,denies DR \par Foot exam: does not see podiatry- rarely has numbness/tingling in bl feet \par Kidney disease: denies \par Heart disease: denies \par \par Weight: Has gained 20 lbs since hospitalization , still underweight with BMI\par Diet: Pt cooks- does not eat out a lot\par B: cereal, eggs\par L: frozen meals, hamburger\par D: chicken, beef, pasta \par S: popcorn and pretzels- drink diet soda\par Exercise: manual labor for career , sometimes goes to gym \par Smoking: vape

## 2020-02-27 NOTE — REVIEW OF SYSTEMS
[Fatigue] : fatigue [Shortness Of Breath] : shortness of breath [Polyuria] : polyuria [Nocturia] : nocturia [Joint Pain] : joint pain [Joint Stiffness] : joint stiffness [Headache] : headaches [Depression] : depression [Tremors] : tremors [Anxiety] : anxiety [Heat Intolerance] : heat intolerant [Cold Intolerance] : cold intolerant [Polydipsia] : polydipsia [Recent Weight Gain (___ Lbs)] : no recent weight gain [Recent Weight Loss (___ Lbs)] : no recent weight loss [Visual Field Defect] : no visual field defect [Blurry Vision] : no blurred vision [Dysphagia] : no dysphagia [Dysphonia] : no dysphonia [Palpitations] : no palpitations [Neck Pain] : no neck pain [Chest Pain] : no chest pain [Nausea] : no nausea [Constipation] : no constipation [Vomiting] : no vomiting was observed [Diarrhea] : no diarrhea [Erectile Dysfunction] : no erectile dysfunction [Acanthosis] : no acanthosis  [Dry Skin] : no dry skin [FreeTextEntry6] : when BS high  [FreeTextEntry9] : knees [de-identified] : when BS low

## 2020-02-28 DIAGNOSIS — E78.5 HYPERLIPIDEMIA, UNSPECIFIED: ICD-10-CM

## 2020-02-28 DIAGNOSIS — E55.9 VITAMIN D DEFICIENCY, UNSPECIFIED: ICD-10-CM

## 2020-02-28 LAB
25(OH)D3 SERPL-MCNC: 24.6 NG/ML
ALBUMIN SERPL ELPH-MCNC: 4.8 G/DL
ALP BLD-CCNC: 139 U/L
ALT SERPL-CCNC: 18 U/L
ANION GAP SERPL CALC-SCNC: 16 MMOL/L
AST SERPL-CCNC: 15 U/L
BASOPHILS # BLD AUTO: 0.07 K/UL
BASOPHILS NFR BLD AUTO: 0.7 %
BILIRUB SERPL-MCNC: 0.2 MG/DL
BUN SERPL-MCNC: 12 MG/DL
CALCIUM SERPL-MCNC: 10 MG/DL
CHLORIDE SERPL-SCNC: 96 MMOL/L
CHOLEST SERPL-MCNC: 270 MG/DL
CHOLEST/HDLC SERPL: 3.1 RATIO
CO2 SERPL-SCNC: 26 MMOL/L
CREAT SERPL-MCNC: 0.44 MG/DL
CREAT SPEC-SCNC: 27 MG/DL
EOSINOPHIL # BLD AUTO: 0.09 K/UL
EOSINOPHIL NFR BLD AUTO: 0.8 %
ESTIMATED AVERAGE GLUCOSE: 260 MG/DL
GLUCOSE SERPL-MCNC: 380 MG/DL
HBA1C MFR BLD HPLC: 10.7 %
HCT VFR BLD CALC: 44.3 %
HDLC SERPL-MCNC: 87 MG/DL
HGB BLD-MCNC: 14.6 G/DL
IMM GRANULOCYTES NFR BLD AUTO: 0.5 %
LDLC SERPL CALC-MCNC: 159 MG/DL
LYMPHOCYTES # BLD AUTO: 3 K/UL
LYMPHOCYTES NFR BLD AUTO: 28.1 %
MAN DIFF?: NORMAL
MCHC RBC-ENTMCNC: 31.4 PG
MCHC RBC-ENTMCNC: 33 GM/DL
MCV RBC AUTO: 95.3 FL
MICROALBUMIN 24H UR DL<=1MG/L-MCNC: <1.2 MG/DL
MICROALBUMIN/CREAT 24H UR-RTO: NORMAL MG/G
MONOCYTES # BLD AUTO: 0.9 K/UL
MONOCYTES NFR BLD AUTO: 8.4 %
NEUTROPHILS # BLD AUTO: 6.56 K/UL
NEUTROPHILS NFR BLD AUTO: 61.5 %
PLATELET # BLD AUTO: 283 K/UL
POTASSIUM SERPL-SCNC: 4.1 MMOL/L
PROT SERPL-MCNC: 7.3 G/DL
RBC # BLD: 4.65 M/UL
RBC # FLD: 11.9 %
SODIUM SERPL-SCNC: 138 MMOL/L
T3FREE SERPL-MCNC: 3.64 PG/ML
T4 FREE SERPL-MCNC: 1.4 NG/DL
TRIGL SERPL-MCNC: 116 MG/DL
TSH SERPL-ACNC: 0.37 UIU/ML
VIT B12 SERPL-MCNC: 512 PG/ML
WBC # FLD AUTO: 10.67 K/UL

## 2020-02-28 RX ORDER — ATORVASTATIN CALCIUM 10 MG/1
10 TABLET, FILM COATED ORAL
Qty: 90 | Refills: 1 | Status: ACTIVE | COMMUNITY
Start: 2020-02-28 | End: 1900-01-01

## 2020-02-28 RX ORDER — ERGOCALCIFEROL 1.25 MG/1
1.25 MG CAPSULE, LIQUID FILLED ORAL
Qty: 13 | Refills: 1 | Status: ACTIVE | COMMUNITY
Start: 2020-02-28 | End: 1900-01-01

## 2020-03-03 NOTE — H&P ADULT - VASCULAR
Equal and normal pulses (carotid, femoral, dorsalis pedis) Provider Procedure Text (A): After obtaining clear surgical margins the defect was repaired by another provider.

## 2020-03-04 ENCOUNTER — TRANSCRIPTION ENCOUNTER (OUTPATIENT)
Age: 25
End: 2020-03-04

## 2020-03-12 ENCOUNTER — NON-APPOINTMENT (OUTPATIENT)
Age: 25
End: 2020-03-12

## 2020-03-12 ENCOUNTER — APPOINTMENT (OUTPATIENT)
Dept: ENDOCRINOLOGY | Facility: CLINIC | Age: 25
End: 2020-03-12
Payer: MEDICAID

## 2020-03-12 PROCEDURE — G0108 DIAB MANAGE TRN  PER INDIV: CPT

## 2020-04-08 ENCOUNTER — APPOINTMENT (OUTPATIENT)
Dept: ENDOCRINOLOGY | Facility: CLINIC | Age: 25
End: 2020-04-08
Payer: MEDICAID

## 2020-04-08 DIAGNOSIS — E10.65 TYPE 1 DIABETES MELLITUS WITH HYPERGLYCEMIA: ICD-10-CM

## 2020-04-08 PROCEDURE — 99442: CPT

## 2020-04-15 ENCOUNTER — APPOINTMENT (OUTPATIENT)
Dept: ENDOCRINOLOGY | Facility: CLINIC | Age: 25
End: 2020-04-15

## 2020-04-27 ENCOUNTER — TRANSCRIPTION ENCOUNTER (OUTPATIENT)
Age: 25
End: 2020-04-27

## 2020-05-27 NOTE — PROGRESS NOTE ADULT - PROVIDER SPECIALTY LIST ADULT
Critical Care Discussed condition with the family this afternoon. All questions answered that could be answered today. They would like a daily update from physicians. I told them the concern now was has there been an anoxic brain damage. Neuro eval and possible CT tomorrow.

## 2020-05-29 ENCOUNTER — RX RENEWAL (OUTPATIENT)
Age: 25
End: 2020-05-29

## 2020-06-29 ENCOUNTER — TRANSCRIPTION ENCOUNTER (OUTPATIENT)
Age: 25
End: 2020-06-29

## 2020-07-01 ENCOUNTER — APPOINTMENT (OUTPATIENT)
Dept: ENDOCRINOLOGY | Facility: CLINIC | Age: 25
End: 2020-07-01

## 2020-09-10 ENCOUNTER — TRANSCRIPTION ENCOUNTER (OUTPATIENT)
Age: 25
End: 2020-09-10

## 2020-09-17 RX ORDER — INSULIN LISPRO 100 U/ML
100 INJECTION, SOLUTION SUBCUTANEOUS
Qty: 2 | Refills: 1 | Status: ACTIVE | COMMUNITY
Start: 2020-05-29 | End: 1900-01-01

## 2020-09-17 RX ORDER — PEN NEEDLE, DIABETIC 32GX 5/32"
32G X 4 MM NEEDLE, DISPOSABLE MISCELLANEOUS
Qty: 4 | Refills: 0 | Status: ACTIVE | COMMUNITY
Start: 1900-01-01 | End: 1900-01-01

## 2022-01-21 NOTE — ED PROVIDER NOTE - PMH
Dietitian evaluation d/t newly dx multiple myeloma. Patient seen reports tolerating regular diet though experiencing acid reflux and wt gain since admit. Discussed wt gain d/t fluid retention; visible fluid retention upper extremities, encouraged ambulation and low salt intake next few days. Discussed smaller, more frequent meals/snacks to prevent acid reflux. Reviewed nutrition recommendations during cancer tx; provided ACS handout for pt's going through CA tx and [facility] and [facility] web sites for resources. All questions answered; RD contact info provided, protein info added to Diet AVS. Encouraged follow up with outpatient oncology  RD PRN. RD will continue to follow per protocol and remain available via consult as needed.      No pertinent past medical history

## 2022-02-18 NOTE — PATIENT PROFILE ADULT. - NS SC CAGE ALCOHOL EYE OPENER
Detail Level: Simple
Additional Notes: Patient consent was obtained to proceed with the visit and recommended plan of care after discussion of all risks and benefits, including the risks of COVID-19 exposure.
Render Risk Assessment In Note?: yes
yes

## 2022-04-12 NOTE — PATIENT PROFILE ADULT - TOBACCO USE
DATE:  4/12/2022   TIME OF RECEIPT FROM LAB:  0906  LAB TEST:  Platelet count  LAB VALUE:  40  RESULTS GIVEN WITH READ-BACK TO (PROVIDER):  BRIGHT COOPER  TIME LAB VALUE REPORTED TO PROVIDER:   0907 - pt seeing provider in clinic today.    Jayleen Hdez RN      Light tobacco smoker

## 2023-07-17 NOTE — ED PROVIDER NOTE - NS ED MD DISPO DIVISION
[facility];  Mariposa Middleton MD    Saw for Seronegative RA: prescribed 200 mg BID hydroxychloroquine daily; once a year eye exam indicated   [facility]

## 2023-07-27 NOTE — PATIENT PROFILE ADULT - HARM RISK FACTORS
"It was nice to meet you today Mary Carmen.  You do have a Pelvic Joint Dysfunction and I am optimistic that addressing this with some physical therapy and some home exercises will allow you to resume your Pilates and dance exercises and other activities as you wish.  If you not happy with the outcome in 4 to 6 weeks let me take a look at you again.  See the assessment and plan below for further details of our discussion today and I wish you a happy new year.    ASSESSMENT: Ashly Tan is a 51 year old female who presents  today for new patient evaluation of:    Low back pain  Pelvic Joint Dysfunction manifesting as a right posterior innominate rotation.  She had minimal symptoms to begin with and some improvement with pain the only painful range before hand which was right side bent  Neurologically intact      PLAN:  Pelvic stabilization PT with Ramesh.  I would encourage her to resume Pilates and if she is feeling good with that and add on some of the dance exercises and return to clinic if she is not happy with her outcome.  At that point I will probably do x-rays and maybe an MRI but at this point I do not think imaging is necessary.  Pelvic Joint Dysfunction self correct taught and she should do this each morning.        PELVIC JOINT SELF CORRECTION EXERCISES      It is best to do this first thing in the morning, and can be repeated once or twice during the day.    \"SHOTGUN\" TECHNIQUE:  This loosens up the front and back of the pelvis.  Do this before the Broomstick exercise.  Use on object such as a rectangular laundry basket.  Lie on your back with your knees bent, feet together and flat on the floor.    Spread your knees approximately 12-24 inches around the outside of an upright laundry basket.  Squeeze your knees together, breathing as you do this.    Concentrate on keeping your buttocks relaxed and on the ground.    A brief discomfort in the front of the pelvis and even a popping sound is normal.  Hold the "
squeeze for 3-5 seconds.    Relax for 3-5 seconds.    Repeat 2 more times.    Now reverse your knee position to the inside of the upside down laundry basket while still lying with your knees bent up, feet on the ground.  Pull your knees apart, breathing easy as you do this.  Hold the squeeze for 3-5 seconds.    Relax for 3-5 seconds.    Repeat 2 more times.    BROOMSTICK EXERCISE:  Lie on your back with your knees bent.  Slide a broomstick or similar object above one knee, and below the opposite knee.  Firmly hold the stick with your hands will bringing your knees closed to your belly, preferably high enough that your back can rest flat on the ground.  Still holding the stick, scissors your legs against the stick, breathing as you do this.    (Push down to your foot with leg on top of the broomstick, and lift up to your chin with the leg below the broomstick).  Hold the squeeze for 3-5 seconds.    Relax for 3-5 seconds.    Repeat 2 more times.  Switch the position of the broomstick above the other knee, and below the first knee.  Repeat the exercise as above in this new position.         
4 = No assist / stand by assistance
yes

## 2024-04-04 NOTE — DISCHARGE NOTE ADULT - COMMUNITY RESOURCES
Quality 431: Preventive Care And Screening: Unhealthy Alcohol Use - Screening: Patient not identified as an unhealthy alcohol user when screened for unhealthy alcohol use using a systematic screening method Quality 130: Documentation Of Current Medications In The Medical Record: Current Medications Documented FOLLOW UP APPOINTMENT DR BLAISE GILL [facility] [phone] MONDAY DEC 3 AT 2PM Quality 226: Preventive Care And Screening: Tobacco Use: Screening And Cessation Intervention: Patient screened for tobacco use and is an ex/non-smoker Detail Level: Detailed

## 2024-09-09 NOTE — PATIENT PROFILE ADULT. - NS PRO AD NO ADVANCE DIRECTIVE
Patient Instructions:    Additional Testing  -I am recommending further Neurodiagnostic workup at this time:  MRI Brain ordered    Headache/migraine treatment:     Prevention  -We discussed holding off on a daily preventative at this time.  Can consider topamax in future    Abortive  -For immediate treatment of a headache/migraine  -For your more moderate to severe migraines take this medication as early as possible:  -Fioricet to use sparingly as needed for headaches  -It is ok to take ibuprofen, acetaminophen or naproxen (Advil, Tylenol,  Aleve, Excedrin) if they help your headaches you should limit these to No more than 3 times a week to avoid medication overuse/rebound headaches.     -Prednisone taper sent to pharmacy to help break current headache cycle.  Take medication with food and avoid NSAID medications (which you already do)    Lifestyle Recommendations:    -Remain well-hydrated drinking at least 48 to 64 ounces of noncaffeinated beverages per day in addition to anything caffeinated.    -Getting adequate rest is also very important for migraine prevention (aim for 7-8 hours per night).     -Regular exercise is also beneficial for headache prevention.  I would encourage at the least 5 days of physical exercise weekly for at least 30 minutes.   -I would like for them to keep track of their migraines using an application on their phone or calendar as they see fit. Phone applications: Migraine Buddy or Migraine Diary.    Education and Follow-up:    -Please call or send a [employer] message with any questions or concerns. Please present to the emergency room with any concerning symptoms such as: worst headache of your life, sudden painless loss of vision or double vision, difficulty speaking or swallowing, vertigo/room spinning that does not quickly resolve, or weakness/numbness/loss of coordination affecting 1 side of the face or body.  -Follow up in 2 months or sooner if needed.     Yes

## 2025-01-14 NOTE — PATIENT PROFILE ADULT - DO YOU FEEL UNSAFE AT WORK?
Speech-Language Pathology Visit      Visit Type: Daily Treatment Note  Visit: 8  Referring Provider: Kendall Rodrigez MD  Medical Diagnosis (from order): Diagnosis Information      Diagnosis    F80.9 (ICD-10-CM) - Speech delay    F84.0 (ICD-10-CM) - Autism spectrum disorder (CMD)              SUBJECTIVE                                                                                                             Present and reporting subjective information: mother  Patient presents for speech therapy accompanied by mother, per recommendation of Dr. Rodrigez, (and this therapist) due to the following speech/language concerns: expressive language delay. Patient cooperative during the session.    ST asked mother if she had a chance to schedule an occupation evaluation since patient has the referral; mother stated things have been busy and she has not had a chance yet. Patient only has one additional speech session scheduled currently; ST let mom know she could schedule at the  on the way out or call to schedule additional ST appointment and an OT evaluation.       OBJECTIVE                                                                                                                                            Language  Patient participated in a play-based language session targeting conversational turn-taking    Patient maintained a conversation for 2 turns by asking questions: 2x during the session following maximum verbal and visual prompts/cues    ASSESSMENT                                                                                                        Patient is a 6 year old male who presents to speech therapy due to concerns with expressive language. Targeted conversation maintenance embedded in play (board game). Patient required a visual to respond to social greetings (e.g., \"hi, how are you?\", \"I'm good, how are you?\"). Patient stated \"I don't want to do this. I want to go home\". Per parent report,  no responding to greetings or questions is hard for patient. Patient continues to benefit from skilled therapy.     Recommendations:  continue current treatment    Education:   - Present: mother  - Education Provided:        SLP impression from session  - Results of above outlined education: Verbalizes understanding    PLAN                                                                                                                         Suggestions for next session as indicated: Progress per plan of care; continue rapport building, expanding utterances, and target conversational maintenance/turn-taking        GOALS                                                                                                                           Long Term Goals: to be met by end of plan of care  1. Goal Attainment Scale (GAS) Goals  Goal: Parents/caregivers will demonstrate/verbalize understanding of at least 4 different language facilitation techniques in order to improve patient's overall communication abilities.  -2 Current level of performance: 0  -1 Greater the current level of performance: 2  0 Expected level of performance: 4  +1 Greater than expected level of performance: 6  +2 Much greater than expected level of performance: 8             GAS Key        -2=Much less than expected outcome (baseline); -1=Less than expected outcome (progressing);          0=Patient achieves expected outcome after intervention (goal, set at evaluation); +1=Better than          expected outcome; +2=Much better than expected outcome    Baseline: -2  Achieved: 0 Change: 2    Status: met Patient's mother demonstrates understanding of the following language facilitation strategies: recast, language expansion, self-talk, and parallel talk.    NEW GOAL 1/7/2025  Goal Attainment Scale (GAS) Goals  Goal: Patient will maintain a conversation for 2 turns by asking questions or commenting given maximum prompts/cues 4 times in a therapy session to  successfully participate in age appropriate conversations  -2 Current level of performance: 0  -1 Greater the current level of performance: 2  0 Expected level of performance: 4  +1 Greater than expected level of performance: 6  +2 Much greater than expected level of performance: 8    2. Patient will complete formal language assessments and a play-based language sample.    Status: met Patient completed select CELF-5 subtests and language samples were obtained during play    NEW GOAL 1/7/2025:  Patient will produce grammatically correct sentences when given a picture scene with 80% accuracy given moderate (3-4) prompts/cues per trial in structured tasks to successfully participate in communication events.         Therapy procedure time and total treatment time can be found documented on the Time Entry flowsheet.

## 2025-04-11 NOTE — PATIENT PROFILE ADULT - FUNCTIONAL SCREEN CURRENT LEVEL: SWALLOWING (IF SCORE 2 OR MORE FOR ANY ITEM, CONSULT REHAB SERVICES), MLM)
Communicate Risk of Fall with Harm to all staff/Reinforce activity limits and safety measures with patient and family/Tailored Fall Risk Interventions/Visual Cue: Yellow wristband and red socks/Bed in lowest position, wheels locked, appropriate side rails in place/Call bell, personal items and telephone in reach/Instruct patient to call for assistance before getting out of bed or chair/Non-slip footwear when patient is out of bed/[place] to call system/Physically safe environment - no spills, clutter or unnecessary equipment/Purposeful Proactive Rounding/Room/bathroom lighting operational, light cord in reach 0 = swallows foods/liquids without difficulty